# Patient Record
Sex: FEMALE | Race: WHITE | Employment: UNEMPLOYED | URBAN - METROPOLITAN AREA
[De-identification: names, ages, dates, MRNs, and addresses within clinical notes are randomized per-mention and may not be internally consistent; named-entity substitution may affect disease eponyms.]

---

## 2017-12-08 PROBLEM — R10.2 PELVIC PAIN IN FEMALE: Status: ACTIVE | Noted: 2017-12-08

## 2018-09-18 ENCOUNTER — HOSPITAL ENCOUNTER (EMERGENCY)
Age: 23
Discharge: HOME | End: 2018-09-19
Attending: EMERGENCY MEDICINE
Payer: COMMERCIAL

## 2018-09-18 VITALS
BODY MASS INDEX: 27.47 KG/M2 | HEIGHT: 67 IN | DIASTOLIC BLOOD PRESSURE: 56 MMHG | RESPIRATION RATE: 16 BRPM | WEIGHT: 175 LBS | HEART RATE: 75 BPM | TEMPERATURE: 97.6 F | SYSTOLIC BLOOD PRESSURE: 109 MMHG | OXYGEN SATURATION: 95 %

## 2018-09-18 DIAGNOSIS — E86.0 DEHYDRATION: ICD-10-CM

## 2018-09-18 DIAGNOSIS — H81.10 BENIGN PAROXYSMAL POSITIONAL VERTIGO, UNSPECIFIED LATERALITY: Primary | ICD-10-CM

## 2018-09-18 DIAGNOSIS — I95.1 ORTHOSTATIC HYPOTENSION: ICD-10-CM

## 2018-09-18 LAB
CHP ED QC CHECK: NORMAL
PREGNANCY TEST URINE, POC: NORMAL

## 2018-09-18 PROCEDURE — 99284 EMERGENCY DEPT VISIT MOD MDM: CPT

## 2018-09-18 PROCEDURE — 6370000000 HC RX 637 (ALT 250 FOR IP): Performed by: STUDENT IN AN ORGANIZED HEALTH CARE EDUCATION/TRAINING PROGRAM

## 2018-09-18 PROCEDURE — 2580000003 HC RX 258: Performed by: STUDENT IN AN ORGANIZED HEALTH CARE EDUCATION/TRAINING PROGRAM

## 2018-09-18 RX ORDER — MECLIZINE HCL 12.5 MG/1
37.5 TABLET ORAL ONCE
Status: COMPLETED | OUTPATIENT
Start: 2018-09-18 | End: 2018-09-18

## 2018-09-18 RX ORDER — SODIUM CHLORIDE 0.9 % (FLUSH) 0.9 %
10 SYRINGE (ML) INJECTION ONCE
Status: DISCONTINUED | OUTPATIENT
Start: 2018-09-18 | End: 2018-09-19 | Stop reason: HOSPADM

## 2018-09-18 RX ADMIN — SODIUM CHLORIDE 1000 ML: 9 INJECTION, SOLUTION INTRAVENOUS at 22:29

## 2018-09-18 RX ADMIN — MECLIZINE 37.5 MG: 12.5 TABLET ORAL at 22:27

## 2018-09-18 ASSESSMENT — ENCOUNTER SYMPTOMS
NAUSEA: 1
VOMITING: 0
EYE REDNESS: 0
VISUAL CHANGE: 0
EYE PAIN: 0
DIARRHEA: 0
BLOOD IN STOOL: 0
SHORTNESS OF BREATH: 0

## 2018-11-26 RX ORDER — SODIUM CHLORIDE, SODIUM LACTATE, POTASSIUM CHLORIDE, CALCIUM CHLORIDE 600; 310; 30; 20 MG/100ML; MG/100ML; MG/100ML; MG/100ML
INJECTION, SOLUTION INTRAVENOUS CONTINUOUS
Status: CANCELLED | OUTPATIENT
Start: 2018-11-26

## 2018-11-26 RX ORDER — SODIUM CHLORIDE 0.9 % (FLUSH) 0.9 %
10 SYRINGE (ML) INJECTION PRN
Status: CANCELLED | OUTPATIENT
Start: 2018-11-26

## 2018-11-27 ENCOUNTER — HOSPITAL ENCOUNTER (OUTPATIENT)
Dept: PREADMISSION TESTING | Age: 23
Discharge: HOME | End: 2018-11-27
Payer: COMMERCIAL

## 2018-11-27 VITALS
OXYGEN SATURATION: 100 % | SYSTOLIC BLOOD PRESSURE: 104 MMHG | BODY MASS INDEX: 27.47 KG/M2 | WEIGHT: 175 LBS | HEIGHT: 67 IN | DIASTOLIC BLOOD PRESSURE: 50 MMHG | HEART RATE: 89 BPM | RESPIRATION RATE: 16 BRPM | TEMPERATURE: 97.8 F

## 2018-11-27 DIAGNOSIS — Z01.818 PRE-OP TESTING: Primary | ICD-10-CM

## 2018-11-27 LAB
HCG(URINE) PREGNANCY TEST: NEGATIVE
HCT VFR BLD CALC: 37.4 % (ref 34–48)
HEMOGLOBIN: 12.1 G/DL (ref 11.5–15.5)
MCH RBC QN AUTO: 28 PG (ref 26–35)
MCHC RBC AUTO-ENTMCNC: 32.4 % (ref 32–34.5)
MCV RBC AUTO: 86.6 FL (ref 80–99.9)
PDW BLD-RTO: 13.8 FL (ref 11.5–15)
PLATELET # BLD: 220 E9/L (ref 130–450)
PMV BLD AUTO: 10.3 FL (ref 7–12)
RBC # BLD: 4.32 E12/L (ref 3.5–5.5)
WBC # BLD: 5.2 E9/L (ref 4.5–11.5)

## 2018-11-27 PROCEDURE — 81025 URINE PREGNANCY TEST: CPT

## 2018-11-27 PROCEDURE — 85027 COMPLETE CBC AUTOMATED: CPT

## 2018-11-27 PROCEDURE — 36415 COLL VENOUS BLD VENIPUNCTURE: CPT

## 2018-11-30 ENCOUNTER — ANESTHESIA (OUTPATIENT)
Dept: OPERATING ROOM | Age: 23
End: 2018-11-30
Payer: COMMERCIAL

## 2018-11-30 ENCOUNTER — HOSPITAL ENCOUNTER (OUTPATIENT)
Age: 23
Setting detail: OUTPATIENT SURGERY
Discharge: HOME | End: 2018-11-30
Attending: OBSTETRICS & GYNECOLOGY | Admitting: OBSTETRICS & GYNECOLOGY
Payer: COMMERCIAL

## 2018-11-30 ENCOUNTER — ANESTHESIA EVENT (OUTPATIENT)
Dept: OPERATING ROOM | Age: 23
End: 2018-11-30
Payer: COMMERCIAL

## 2018-11-30 VITALS
WEIGHT: 173.13 LBS | BODY MASS INDEX: 27.17 KG/M2 | RESPIRATION RATE: 16 BRPM | TEMPERATURE: 98.4 F | HEIGHT: 67 IN | OXYGEN SATURATION: 98 % | SYSTOLIC BLOOD PRESSURE: 104 MMHG | HEART RATE: 115 BPM | DIASTOLIC BLOOD PRESSURE: 62 MMHG

## 2018-11-30 VITALS
RESPIRATION RATE: 2 BRPM | SYSTOLIC BLOOD PRESSURE: 99 MMHG | DIASTOLIC BLOOD PRESSURE: 58 MMHG | OXYGEN SATURATION: 100 % | TEMPERATURE: 97.5 F

## 2018-11-30 DIAGNOSIS — R10.2 PELVIC PAIN IN FEMALE: Primary | ICD-10-CM

## 2018-11-30 PROBLEM — G89.29 CHRONIC FEMALE PELVIC PAIN: Status: ACTIVE | Noted: 2017-12-08

## 2018-11-30 PROCEDURE — 2500000003 HC RX 250 WO HCPCS

## 2018-11-30 PROCEDURE — 2709999900 HC NON-CHARGEABLE SUPPLY: Performed by: OBSTETRICS & GYNECOLOGY

## 2018-11-30 PROCEDURE — 3700000001 HC ADD 15 MINUTES (ANESTHESIA): Performed by: OBSTETRICS & GYNECOLOGY

## 2018-11-30 PROCEDURE — 6360000002 HC RX W HCPCS

## 2018-11-30 PROCEDURE — 2580000003 HC RX 258: Performed by: ANESTHESIOLOGY

## 2018-11-30 PROCEDURE — 3700000000 HC ANESTHESIA ATTENDED CARE: Performed by: OBSTETRICS & GYNECOLOGY

## 2018-11-30 PROCEDURE — 7100000011 HC PHASE II RECOVERY - ADDTL 15 MIN: Performed by: OBSTETRICS & GYNECOLOGY

## 2018-11-30 PROCEDURE — 7100000001 HC PACU RECOVERY - ADDTL 15 MIN: Performed by: OBSTETRICS & GYNECOLOGY

## 2018-11-30 PROCEDURE — 7100000010 HC PHASE II RECOVERY - FIRST 15 MIN: Performed by: OBSTETRICS & GYNECOLOGY

## 2018-11-30 PROCEDURE — 3600000004 HC SURGERY LEVEL 4 BASE: Performed by: OBSTETRICS & GYNECOLOGY

## 2018-11-30 PROCEDURE — 6360000002 HC RX W HCPCS: Performed by: ANESTHESIOLOGY

## 2018-11-30 PROCEDURE — C1765 ADHESION BARRIER: HCPCS | Performed by: OBSTETRICS & GYNECOLOGY

## 2018-11-30 PROCEDURE — 7100000000 HC PACU RECOVERY - FIRST 15 MIN: Performed by: OBSTETRICS & GYNECOLOGY

## 2018-11-30 PROCEDURE — 3600000014 HC SURGERY LEVEL 4 ADDTL 15MIN: Performed by: OBSTETRICS & GYNECOLOGY

## 2018-11-30 RX ORDER — SODIUM CHLORIDE 0.9 % (FLUSH) 0.9 %
10 SYRINGE (ML) INJECTION PRN
Status: DISCONTINUED | OUTPATIENT
Start: 2018-11-30 | End: 2018-11-30 | Stop reason: SDUPTHER

## 2018-11-30 RX ORDER — NEOSTIGMINE METHYLSULFATE 1 MG/ML
INJECTION, SOLUTION INTRAVENOUS PRN
Status: DISCONTINUED | OUTPATIENT
Start: 2018-11-30 | End: 2018-11-30 | Stop reason: SDUPTHER

## 2018-11-30 RX ORDER — SODIUM CHLORIDE, SODIUM LACTATE, POTASSIUM CHLORIDE, CALCIUM CHLORIDE 600; 310; 30; 20 MG/100ML; MG/100ML; MG/100ML; MG/100ML
INJECTION, SOLUTION INTRAVENOUS CONTINUOUS
Status: DISCONTINUED | OUTPATIENT
Start: 2018-11-30 | End: 2018-11-30 | Stop reason: HOSPADM

## 2018-11-30 RX ORDER — KETOROLAC TROMETHAMINE 30 MG/ML
INJECTION, SOLUTION INTRAMUSCULAR; INTRAVENOUS PRN
Status: DISCONTINUED | OUTPATIENT
Start: 2018-11-30 | End: 2018-11-30 | Stop reason: SDUPTHER

## 2018-11-30 RX ORDER — MEPERIDINE HYDROCHLORIDE 25 MG/ML
12.5 INJECTION INTRAMUSCULAR; INTRAVENOUS; SUBCUTANEOUS EVERY 5 MIN PRN
Status: DISCONTINUED | OUTPATIENT
Start: 2018-11-30 | End: 2018-11-30 | Stop reason: HOSPADM

## 2018-11-30 RX ORDER — FENTANYL CITRATE 50 UG/ML
50 INJECTION, SOLUTION INTRAMUSCULAR; INTRAVENOUS EVERY 5 MIN PRN
Status: DISCONTINUED | OUTPATIENT
Start: 2018-11-30 | End: 2018-11-30 | Stop reason: HOSPADM

## 2018-11-30 RX ORDER — SODIUM CHLORIDE 0.9 % (FLUSH) 0.9 %
10 SYRINGE (ML) INJECTION EVERY 12 HOURS SCHEDULED
Status: DISCONTINUED | OUTPATIENT
Start: 2018-11-30 | End: 2018-11-30 | Stop reason: HOSPADM

## 2018-11-30 RX ORDER — FENTANYL CITRATE 50 UG/ML
INJECTION, SOLUTION INTRAMUSCULAR; INTRAVENOUS PRN
Status: DISCONTINUED | OUTPATIENT
Start: 2018-11-30 | End: 2018-11-30 | Stop reason: SDUPTHER

## 2018-11-30 RX ORDER — DEXAMETHASONE SODIUM PHOSPHATE 4 MG/ML
INJECTION, SOLUTION INTRA-ARTICULAR; INTRALESIONAL; INTRAMUSCULAR; INTRAVENOUS; SOFT TISSUE PRN
Status: DISCONTINUED | OUTPATIENT
Start: 2018-11-30 | End: 2018-11-30 | Stop reason: SDUPTHER

## 2018-11-30 RX ORDER — GLYCOPYRROLATE 1 MG/5 ML
SYRINGE (ML) INTRAVENOUS PRN
Status: DISCONTINUED | OUTPATIENT
Start: 2018-11-30 | End: 2018-11-30 | Stop reason: SDUPTHER

## 2018-11-30 RX ORDER — ONDANSETRON 2 MG/ML
INJECTION INTRAMUSCULAR; INTRAVENOUS PRN
Status: DISCONTINUED | OUTPATIENT
Start: 2018-11-30 | End: 2018-11-30 | Stop reason: SDUPTHER

## 2018-11-30 RX ORDER — FENTANYL CITRATE 50 UG/ML
25 INJECTION, SOLUTION INTRAMUSCULAR; INTRAVENOUS EVERY 5 MIN PRN
Status: DISCONTINUED | OUTPATIENT
Start: 2018-11-30 | End: 2018-11-30 | Stop reason: HOSPADM

## 2018-11-30 RX ORDER — MIDAZOLAM HYDROCHLORIDE 1 MG/ML
INJECTION INTRAMUSCULAR; INTRAVENOUS PRN
Status: DISCONTINUED | OUTPATIENT
Start: 2018-11-30 | End: 2018-11-30 | Stop reason: SDUPTHER

## 2018-11-30 RX ORDER — ONDANSETRON 2 MG/ML
4 INJECTION INTRAMUSCULAR; INTRAVENOUS
Status: DISCONTINUED | OUTPATIENT
Start: 2018-11-30 | End: 2018-11-30 | Stop reason: HOSPADM

## 2018-11-30 RX ORDER — ROCURONIUM BROMIDE 10 MG/ML
INJECTION, SOLUTION INTRAVENOUS PRN
Status: DISCONTINUED | OUTPATIENT
Start: 2018-11-30 | End: 2018-11-30 | Stop reason: SDUPTHER

## 2018-11-30 RX ORDER — PROPOFOL 10 MG/ML
INJECTION, EMULSION INTRAVENOUS PRN
Status: DISCONTINUED | OUTPATIENT
Start: 2018-11-30 | End: 2018-11-30 | Stop reason: SDUPTHER

## 2018-11-30 RX ORDER — LIDOCAINE HYDROCHLORIDE 20 MG/ML
INJECTION, SOLUTION INFILTRATION; PERINEURAL PRN
Status: DISCONTINUED | OUTPATIENT
Start: 2018-11-30 | End: 2018-11-30 | Stop reason: SDUPTHER

## 2018-11-30 RX ORDER — FENTANYL CITRATE 50 UG/ML
INJECTION, SOLUTION INTRAMUSCULAR; INTRAVENOUS
Status: DISCONTINUED
Start: 2018-11-30 | End: 2018-11-30 | Stop reason: HOSPADM

## 2018-11-30 RX ORDER — SODIUM CHLORIDE 0.9 % (FLUSH) 0.9 %
10 SYRINGE (ML) INJECTION PRN
Status: DISCONTINUED | OUTPATIENT
Start: 2018-11-30 | End: 2018-11-30 | Stop reason: HOSPADM

## 2018-11-30 RX ADMIN — PROPOFOL 200 MG: 10 INJECTION, EMULSION INTRAVENOUS at 13:19

## 2018-11-30 RX ADMIN — KETOROLAC TROMETHAMINE 30 MG: 30 INJECTION, SOLUTION INTRAMUSCULAR; INTRAVENOUS at 13:51

## 2018-11-30 RX ADMIN — FENTANYL CITRATE 50 MCG: 50 INJECTION, SOLUTION INTRAMUSCULAR; INTRAVENOUS at 13:34

## 2018-11-30 RX ADMIN — SODIUM CHLORIDE, POTASSIUM CHLORIDE, SODIUM LACTATE AND CALCIUM CHLORIDE: 600; 310; 30; 20 INJECTION, SOLUTION INTRAVENOUS at 10:36

## 2018-11-30 RX ADMIN — FENTANYL CITRATE 100 MCG: 50 INJECTION, SOLUTION INTRAMUSCULAR; INTRAVENOUS at 13:19

## 2018-11-30 RX ADMIN — MIDAZOLAM 2 MG: 1 INJECTION INTRAMUSCULAR; INTRAVENOUS at 13:17

## 2018-11-30 RX ADMIN — FENTANYL CITRATE 50 MCG: 50 INJECTION, SOLUTION INTRAMUSCULAR; INTRAVENOUS at 13:25

## 2018-11-30 RX ADMIN — DEXAMETHASONE SODIUM PHOSPHATE 4 MG: 4 INJECTION, SOLUTION INTRA-ARTICULAR; INTRALESIONAL; INTRAMUSCULAR; INTRAVENOUS; SOFT TISSUE at 13:19

## 2018-11-30 RX ADMIN — LIDOCAINE HYDROCHLORIDE 50 MG: 20 INJECTION, SOLUTION INFILTRATION; PERINEURAL at 13:19

## 2018-11-30 RX ADMIN — ROCURONIUM BROMIDE 30 MG: 10 INJECTION, SOLUTION INTRAVENOUS at 13:19

## 2018-11-30 RX ADMIN — DEXAMETHASONE SODIUM PHOSPHATE 4 MG: 4 INJECTION, SOLUTION INTRA-ARTICULAR; INTRALESIONAL; INTRAMUSCULAR; INTRAVENOUS; SOFT TISSUE at 13:43

## 2018-11-30 RX ADMIN — Medication 0.6 MG: at 13:51

## 2018-11-30 RX ADMIN — FENTANYL CITRATE 50 MCG: 50 INJECTION, SOLUTION INTRAMUSCULAR; INTRAVENOUS at 14:05

## 2018-11-30 RX ADMIN — ONDANSETRON HYDROCHLORIDE 4 MG: 2 INJECTION, SOLUTION INTRAMUSCULAR; INTRAVENOUS at 13:19

## 2018-11-30 RX ADMIN — Medication 3 MG: at 13:51

## 2018-11-30 RX ADMIN — FENTANYL CITRATE 50 MCG: 50 INJECTION, SOLUTION INTRAMUSCULAR; INTRAVENOUS at 14:32

## 2018-11-30 RX ADMIN — FENTANYL CITRATE 50 MCG: 50 INJECTION, SOLUTION INTRAMUSCULAR; INTRAVENOUS at 14:22

## 2018-11-30 ASSESSMENT — PULMONARY FUNCTION TESTS
PIF_VALUE: 14
PIF_VALUE: 29
PIF_VALUE: 0
PIF_VALUE: 0
PIF_VALUE: 19
PIF_VALUE: 14
PIF_VALUE: 36
PIF_VALUE: 16
PIF_VALUE: 32
PIF_VALUE: 13
PIF_VALUE: 16
PIF_VALUE: 17
PIF_VALUE: 2
PIF_VALUE: 14
PIF_VALUE: 23
PIF_VALUE: 20
PIF_VALUE: 2
PIF_VALUE: 14
PIF_VALUE: 21
PIF_VALUE: 12
PIF_VALUE: 12
PIF_VALUE: 17
PIF_VALUE: 20
PIF_VALUE: 0
PIF_VALUE: 36
PIF_VALUE: 35
PIF_VALUE: 2
PIF_VALUE: 36
PIF_VALUE: 20
PIF_VALUE: 2
PIF_VALUE: 0
PIF_VALUE: 17
PIF_VALUE: 27
PIF_VALUE: 18
PIF_VALUE: 16
PIF_VALUE: 18
PIF_VALUE: 0
PIF_VALUE: 17
PIF_VALUE: 0
PIF_VALUE: 13
PIF_VALUE: 17
PIF_VALUE: 1
PIF_VALUE: 12
PIF_VALUE: 14
PIF_VALUE: 14
PIF_VALUE: 16

## 2018-11-30 ASSESSMENT — PAIN DESCRIPTION - PROGRESSION
CLINICAL_PROGRESSION: GRADUALLY WORSENING
CLINICAL_PROGRESSION: GRADUALLY IMPROVING

## 2018-11-30 ASSESSMENT — PAIN DESCRIPTION - FREQUENCY
FREQUENCY: CONTINUOUS
FREQUENCY: CONTINUOUS

## 2018-11-30 ASSESSMENT — PAIN SCALES - GENERAL
PAINLEVEL_OUTOF10: 6
PAINLEVEL_OUTOF10: 0
PAINLEVEL_OUTOF10: 2
PAINLEVEL_OUTOF10: 4
PAINLEVEL_OUTOF10: 4

## 2018-11-30 ASSESSMENT — PAIN DESCRIPTION - ONSET
ONSET: ON-GOING
ONSET: SUDDEN
ONSET: GRADUAL

## 2018-11-30 ASSESSMENT — PAIN DESCRIPTION - DESCRIPTORS
DESCRIPTORS: CONSTANT;DISCOMFORT;DULL
DESCRIPTORS: ACHING
DESCRIPTORS: CONSTANT;DISCOMFORT;SHARP

## 2018-11-30 ASSESSMENT — PAIN DESCRIPTION - PAIN TYPE
TYPE: ACUTE PAIN;SURGICAL PAIN
TYPE: ACUTE PAIN;SURGICAL PAIN
TYPE: SURGICAL PAIN

## 2018-11-30 ASSESSMENT — PAIN DESCRIPTION - ORIENTATION
ORIENTATION: LOWER;MID
ORIENTATION: MID
ORIENTATION: ANTERIOR;MID;LOWER

## 2018-11-30 ASSESSMENT — PAIN - FUNCTIONAL ASSESSMENT: PAIN_FUNCTIONAL_ASSESSMENT: 0-10

## 2018-11-30 ASSESSMENT — PAIN DESCRIPTION - LOCATION
LOCATION: ABDOMEN

## 2018-11-30 NOTE — OP NOTE
injector was removed. She was placed in a supine position, awakened from anesthesia, and transferred to the recovery room in a good stable condition.      Nyasia Garcia  11/30/2018  1:54 PM

## 2018-11-30 NOTE — H&P
Department of Gynecology  H&P Note          CHIEF COMPLAINT:   Chronic pelvic pain    History obtained from patient    HISTORY OF PRESENT ILLNESS:     The patient is a 21 y.o. female with significant past medical history of endometriosis who presents with chronic pelvic pain    Past Medical History:        Diagnosis Date    Depression     ADHD    Prolonged emergence from general anesthesia     slow to wake up after c section     Past Surgical History:        Procedure Laterality Date     SECTION      LAPAROSCOPY  2017    MIDDLE EAR SURGERY      MYRINGOTOMY AND TYMPANOSTOMY TUBE PLACEMENT      TONSILLECTOMY         Past Gynecological History:    1. Last menstrual period:  18  2. Menses: interval:  4 weeks  duration:  6 day(s)  3. Contraception: Nexplanon  4. Sexually transmitted disease history: none        A. Number of sexual partners in the last 6 months: 1    meds:No current facility-administered medications for this encounter. No current outpatient prescriptions on file. Allergies:  Pcn [penicillins]     Social History:  TOBACCO:   reports that she has never smoked. She has never used smokeless tobacco.  ETOH:   reports that she does not drink alcohol. DRUGS:   reports that she does not use drugs. Family History:   No family history on file. PHYSICAL EXAM:    Vitals:  LMP 2018 (Exact Date)     CONSTITUTIONAL:  awake, alert, cooperative, no apparent distress, and appears stated age  ENT:  Normocephalic, without obvious abnormality, atraumatic, sinuses nontender on palpation, external ears without lesions, oral pharynx with moist mucus membranes, tonsils without erythema or exudates, gums normal and good dentition.   NECK:  Supple, symmetrical, trachea midline, no adenopathy, thyroid symmetric, not enlarged and no tenderness, skin normal  BACK:  Symmetric, no curvature, spinous processes are non-tender on palpation, paraspinous muscles are non-tender on palpation, no

## 2018-11-30 NOTE — PROGRESS NOTES
CLINICAL PHARMACY NOTE: MEDS TO [address] Select Patient?: No  Total # of Prescriptions Filled: 1   The following medications were delivered to the patient:  · Hydrocodone/apap 5/325  Total # of Interventions Completed: 3  Time Spent (min): 15    Additional Documentation:

## 2019-03-15 ENCOUNTER — HOSPITAL ENCOUNTER (EMERGENCY)
Age: 24
Discharge: HOME | End: 2019-03-15
Payer: COMMERCIAL

## 2019-03-15 VITALS
BODY MASS INDEX: 28.98 KG/M2 | HEART RATE: 92 BPM | RESPIRATION RATE: 16 BRPM | SYSTOLIC BLOOD PRESSURE: 115 MMHG | DIASTOLIC BLOOD PRESSURE: 72 MMHG | OXYGEN SATURATION: 98 % | TEMPERATURE: 97.5 F | WEIGHT: 185 LBS

## 2019-03-15 DIAGNOSIS — J06.9 VIRAL UPPER RESPIRATORY TRACT INFECTION: Primary | ICD-10-CM

## 2019-03-15 PROCEDURE — 99212 OFFICE O/P EST SF 10 MIN: CPT

## 2019-03-15 RX ORDER — BROMPHENIRAMINE MALEATE, PSEUDOEPHEDRINE HYDROCHLORIDE, AND DEXTROMETHORPHAN HYDROBROMIDE 2; 30; 10 MG/5ML; MG/5ML; MG/5ML
5 SYRUP ORAL 4 TIMES DAILY PRN
Qty: 140 ML | Refills: 0 | Status: SHIPPED | OUTPATIENT
Start: 2019-03-15 | End: 2019-03-22

## 2019-04-01 ENCOUNTER — HOSPITAL ENCOUNTER (EMERGENCY)
Age: 24
Discharge: HOME | End: 2019-04-01
Payer: COMMERCIAL

## 2019-04-01 VITALS
SYSTOLIC BLOOD PRESSURE: 123 MMHG | HEART RATE: 99 BPM | OXYGEN SATURATION: 99 % | BODY MASS INDEX: 29.03 KG/M2 | DIASTOLIC BLOOD PRESSURE: 78 MMHG | RESPIRATION RATE: 16 BRPM | TEMPERATURE: 97.8 F | HEIGHT: 67 IN | WEIGHT: 185 LBS

## 2019-04-01 DIAGNOSIS — J06.9 ACUTE UPPER RESPIRATORY INFECTION: Primary | ICD-10-CM

## 2019-04-01 PROCEDURE — 99282 EMERGENCY DEPT VISIT SF MDM: CPT

## 2019-04-01 RX ORDER — BROMPHENIRAMINE MALEATE, PSEUDOEPHEDRINE HYDROCHLORIDE, AND DEXTROMETHORPHAN HYDROBROMIDE 2; 30; 10 MG/5ML; MG/5ML; MG/5ML
5 SYRUP ORAL 4 TIMES DAILY PRN
Qty: 1 BOTTLE | Refills: 0 | Status: SHIPPED | OUTPATIENT
Start: 2019-04-01 | End: 2019-04-08

## 2019-04-02 NOTE — ED PROVIDER NOTES
[facility]     Department of Emergency Medicine   ED  Provider Note  Admit Date/RoomTime: 2019  8:29 PM  ED Room: [place] [address]  Chief Complaint:   Influenza (fever today, headache, nausea without emesis, )    History of Present Illness   Source of history provided by:  patient and spouse/SO. History/Exam Limitations: none. Salena Ferguson is a 21 y.o. old female with a past medical history of:   Past Medical History:   Diagnosis Date    Depression     ADHD    Endometriosis of pelvis 2018    Prolonged emergence from general anesthesia     slow to wake up after c section    presents to the emergency department by private vehicle, for cough congestion, fever, headache, and nausea, which began 1 day(s) prior to arrival.  Since onset the symptoms have been persistent and mild-moderate in severity. The symptoms are associated with none of significance. There has been NO none of significance. ROS   Pertinent positives and negatives are stated within HPI, all other systems reviewed and are negative. Past Surgical History:  has a past surgical history that includes Tonsillectomy; Myringotomy Tympanostomy Tube Placement; Middle ear surgery;  section; laparoscopy (2017); and pr lap,lysis of adhesions (N/A, 2018). Social History:  reports that she has never smoked. She has never used smokeless tobacco. She reports that she does not drink alcohol or use drugs. Family History: family history is not on file. Allergies: Pcn [penicillins]    Physical Exam           ED Triage Vitals   BP Temp Temp src Pulse Resp SpO2 Height Weight   19 -- 19   123/78 97.8 °F (36.6 °C)  99 16 99 % 5' 7\" (1.702 m) 185 lb (83.9 kg)      Oxygen Saturation Interpretation: Normal.    · Constitutional:  Alert, development consistent with age.   · Ears:  External Ears: Bilateral normal.               TM's & External Canals: normal appearance. · Nose:   There is clear rhinorrhea. · Sinuses: no Bilateral maxillary sinus tenderness. no Bilateral frontal sinus tenderness. · Mouth:  normal tongue and buccal mucosa. · Throat: no erythema or exudates noted. Teeth and gums normal..  Airway Patent. · Neck:  Supple. There is no  preauricular, submental, parotid, anterior cervical and posterior cervical node tenderness. · Respiratory:   Breath sounds: Bilateral normal.  Lung sounds: normal.   · CV:  Regular rate and rhythm, normal heart sounds, without pathological murmurs, ectopy, gallops, or rubs. · GI:  Abdomen Soft, nontender, good bowel sounds. No firm or pulsatile mass. · Integument:  Normal turgor. Warm, dry, without visible rash. · Neurological:  Oriented. Motor functions intact. Lab / Imaging Results   (All laboratory and radiology results have been personally reviewed by myself)  Labs:  No results found for this visit on 04/01/19. Imaging: All Radiology results interpreted by Radiologist unless otherwise noted. No orders to display       ED Course / Medical Decision Making   Medications - No data to display       Medical Decision Making:    Based on low suspicion for pneumonia as per history/physical findings, imaging was not done. Upper respiratory infection is likely to  be viral in etiology. Antibiotics are not indicated at this time based on clinical presentation and physical findings. She is not hypoxic. Patient is well appearing, non toxic and appropriate for outpatient management. Plan of Care: Normal progression of disease discussed. All questions answered. Explained the rationale for symptomatic treatment rather than use of an antibiotic. Instruction provided in the use of fluids, vaporizer, acetaminophen, and other OTC medication for symptom control. Extra fluids  Analgesics as needed, dose reviewed. Follow up as needed should symptoms fail to improve.

## 2019-08-26 ENCOUNTER — HOSPITAL ENCOUNTER (EMERGENCY)
Age: 24
Discharge: HOME | End: 2019-08-26
Payer: COMMERCIAL

## 2019-08-26 VITALS
TEMPERATURE: 98.1 F | SYSTOLIC BLOOD PRESSURE: 100 MMHG | WEIGHT: 188 LBS | HEART RATE: 94 BPM | RESPIRATION RATE: 16 BRPM | OXYGEN SATURATION: 99 % | BODY MASS INDEX: 29.44 KG/M2 | DIASTOLIC BLOOD PRESSURE: 66 MMHG

## 2019-08-26 DIAGNOSIS — T30.0 BURN: Primary | ICD-10-CM

## 2019-08-26 PROCEDURE — 99212 OFFICE O/P EST SF 10 MIN: CPT

## 2019-08-26 RX ORDER — CEPHALEXIN 500 MG/1
500 CAPSULE ORAL 3 TIMES DAILY
Qty: 21 CAPSULE | Refills: 0 | Status: SHIPPED | OUTPATIENT
Start: 2019-08-26 | End: 2019-09-02

## 2019-08-26 ASSESSMENT — PAIN DESCRIPTION - PAIN TYPE: TYPE: ACUTE PAIN

## 2019-08-26 ASSESSMENT — PAIN SCALES - GENERAL: PAINLEVEL_OUTOF10: 6

## 2019-08-26 ASSESSMENT — PAIN DESCRIPTION - LOCATION: LOCATION: LEG

## 2019-08-26 ASSESSMENT — PAIN DESCRIPTION - ORIENTATION: ORIENTATION: LEFT

## 2019-08-26 ASSESSMENT — PAIN DESCRIPTION - DESCRIPTORS: DESCRIPTORS: BURNING

## 2019-08-27 ENCOUNTER — HOSPITAL ENCOUNTER (EMERGENCY)
Age: 24
Discharge: HOME | End: 2019-08-27
Attending: EMERGENCY MEDICINE
Payer: COMMERCIAL

## 2019-08-27 VITALS
HEART RATE: 100 BPM | RESPIRATION RATE: 20 BRPM | TEMPERATURE: 98.6 F | DIASTOLIC BLOOD PRESSURE: 73 MMHG | BODY MASS INDEX: 29.51 KG/M2 | HEIGHT: 67 IN | SYSTOLIC BLOOD PRESSURE: 112 MMHG | OXYGEN SATURATION: 97 % | WEIGHT: 188 LBS

## 2019-08-27 DIAGNOSIS — T24.202A PARTIAL THICKNESS BURN OF LEFT LOWER EXTREMITY, INITIAL ENCOUNTER: Primary | ICD-10-CM

## 2019-08-27 PROCEDURE — 16020 DRESS/DEBRID P-THICK BURN S: CPT

## 2019-08-27 PROCEDURE — 99283 EMERGENCY DEPT VISIT LOW MDM: CPT

## 2019-08-27 RX ORDER — ACETAMINOPHEN 500 MG
500 TABLET ORAL EVERY 6 HOURS PRN
Qty: 30 TABLET | Refills: 0 | Status: ON HOLD | OUTPATIENT
Start: 2019-08-27 | End: 2019-09-26 | Stop reason: HOSPADM

## 2019-08-27 RX ORDER — BACITRACIN, NEOMYCIN, POLYMYXIN B 400; 3.5; 5 [USP'U]/G; MG/G; [USP'U]/G
OINTMENT TOPICAL
Qty: 30 G | Refills: 0 | Status: SHIPPED | OUTPATIENT
Start: 2019-08-27 | End: 2019-09-06

## 2019-08-27 ASSESSMENT — ENCOUNTER SYMPTOMS
SHORTNESS OF BREATH: 0
DIARRHEA: 0
EYE PAIN: 0
BACK PAIN: 0
VOMITING: 0
NAUSEA: 0
COUGH: 0
EYE DISCHARGE: 0
SINUS PRESSURE: 0
SORE THROAT: 0
WHEEZING: 0
ABDOMINAL DISTENTION: 0
EYE REDNESS: 0
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
## 2019-09-23 ENCOUNTER — HOSPITAL ENCOUNTER (EMERGENCY)
Age: 24
Discharge: HOME | End: 2019-09-23
Payer: COMMERCIAL

## 2019-09-23 VITALS
BODY MASS INDEX: 28.19 KG/M2 | OXYGEN SATURATION: 99 % | SYSTOLIC BLOOD PRESSURE: 106 MMHG | WEIGHT: 180 LBS | HEART RATE: 87 BPM | RESPIRATION RATE: 20 BRPM | DIASTOLIC BLOOD PRESSURE: 70 MMHG | TEMPERATURE: 97.9 F

## 2019-09-23 DIAGNOSIS — H69.83 DYSFUNCTION OF BOTH EUSTACHIAN TUBES: Primary | ICD-10-CM

## 2019-09-23 PROCEDURE — 99212 OFFICE O/P EST SF 10 MIN: CPT

## 2019-09-23 RX ORDER — CEPHALEXIN 500 MG/1
500 CAPSULE ORAL 3 TIMES DAILY
Qty: 21 CAPSULE | Refills: 0 | Status: SHIPPED | OUTPATIENT
Start: 2019-09-23 | End: 2019-09-30

## 2019-09-23 ASSESSMENT — PAIN SCALES - GENERAL: PAINLEVEL_OUTOF10: 8

## 2019-09-23 ASSESSMENT — PAIN DESCRIPTION - ORIENTATION: ORIENTATION: RIGHT;LEFT

## 2019-09-23 ASSESSMENT — PAIN DESCRIPTION - LOCATION: LOCATION: EAR

## 2019-09-23 NOTE — ED PROVIDER NOTES
Department of Emergency Medicine   [facility]  Provider Note  Admit Date/RoomTime: 2019  5:20 PM  Room:   Chief Complaint:   Otalgia (started  today    with  ajay ear pain   just getting over cold   coughing  up mucous ,   pt is 12 weeks  preg )    History of Present Illness   Source of history provided by:  patient. History/Exam Limitations: none. Khang Zuniga is a 25 y.o. old female with a past medical history of:   Past Medical History:   Diagnosis Date    Depression     ADHD    Endometriosis of pelvis 2018    Prolonged emergence from general anesthesia     slow to wake up after c section   ,who presents to urgent care center with complaint of plugged sensation in both ears. Symptoms began today. She is just getting a cold and feels like now her ears are plugged she said the hearing is decreased in both ears it sounds muffled. And she has pressure in her ears. Does not have any fever or neck stiffness does not have any chest pain or shortness of breath. ROS    Pertinent positives and negatives are stated within HPI, all other systems reviewed and are negative. Past Surgical History:  has a past surgical history that includes Tonsillectomy; Myringotomy Tympanostomy Tube Placement; Middle ear surgery;  section; laparoscopy (2017); and pr lap,lysis of adhesions (N/A, 2018). Social History:  reports that she has never smoked. She has never used smokeless tobacco. She reports that she does not drink alcohol or use drugs. Family History: family history is not on file. Allergies: Pcn [penicillins]    Physical Exam           ED Triage Vitals [19 1722]   BP Temp Temp Source Pulse Resp SpO2 Height Weight   106/70 97.9 °F (36.6 °C) Oral 87 20 99 % -- 180 lb (81.6 kg)      Oxygen Saturation Interpretation: Normal.    Constitutional:  Alert, development consistent with age. Ears:  External Ears: Bilateral pain with motion.

## 2019-09-25 ENCOUNTER — APPOINTMENT (OUTPATIENT)
Dept: ULTRASOUND IMAGING | Age: 24
End: 2019-09-25
Payer: COMMERCIAL

## 2019-09-25 ENCOUNTER — HOSPITAL ENCOUNTER (EMERGENCY)
Age: 24
Discharge: HOME | End: 2019-09-26
Attending: EMERGENCY MEDICINE
Payer: COMMERCIAL

## 2019-09-25 VITALS
OXYGEN SATURATION: 100 % | DIASTOLIC BLOOD PRESSURE: 76 MMHG | TEMPERATURE: 98.1 F | WEIGHT: 180 LBS | BODY MASS INDEX: 28.25 KG/M2 | HEART RATE: 91 BPM | SYSTOLIC BLOOD PRESSURE: 119 MMHG | RESPIRATION RATE: 20 BRPM | HEIGHT: 67 IN

## 2019-09-25 DIAGNOSIS — O03.9 MISCARRIAGE: Primary | ICD-10-CM

## 2019-09-25 LAB
ABO/RH: NORMAL
ALBUMIN SERPL-MCNC: 4.1 G/DL (ref 3.5–5.2)
ALP BLD-CCNC: 105 U/L (ref 35–104)
ALT SERPL-CCNC: 9 U/L (ref 0–32)
ANION GAP SERPL CALCULATED.3IONS-SCNC: 14 MMOL/L (ref 7–16)
AST SERPL-CCNC: 13 U/L (ref 0–31)
BASOPHILS ABSOLUTE: 0.02 E9/L (ref 0–0.2)
BASOPHILS RELATIVE PERCENT: 0.2 % (ref 0–2)
BILIRUB SERPL-MCNC: 0.3 MG/DL (ref 0–1.2)
BUN BLDV-MCNC: 5 MG/DL (ref 6–20)
CALCIUM SERPL-MCNC: 9.3 MG/DL (ref 8.6–10.2)
CHLORIDE BLD-SCNC: 101 MMOL/L (ref 98–107)
CO2: 23 MMOL/L (ref 22–29)
CREAT SERPL-MCNC: 0.5 MG/DL (ref 0.5–1)
EOSINOPHILS ABSOLUTE: 0.08 E9/L (ref 0.05–0.5)
EOSINOPHILS RELATIVE PERCENT: 1 % (ref 0–6)
GFR AFRICAN AMERICAN: >60
GFR NON-AFRICAN AMERICAN: >60 ML/MIN/1.73
GLUCOSE BLD-MCNC: 98 MG/DL (ref 74–99)
HCT VFR BLD CALC: 33.2 % (ref 34–48)
HEMOGLOBIN: 10.7 G/DL (ref 11.5–15.5)
IMMATURE GRANULOCYTES #: 0.02 E9/L
IMMATURE GRANULOCYTES %: 0.2 % (ref 0–5)
LYMPHOCYTES ABSOLUTE: 1.51 E9/L (ref 1.5–4)
LYMPHOCYTES RELATIVE PERCENT: 18.6 % (ref 20–42)
MCH RBC QN AUTO: 28.5 PG (ref 26–35)
MCHC RBC AUTO-ENTMCNC: 32.2 % (ref 32–34.5)
MCV RBC AUTO: 88.3 FL (ref 80–99.9)
MONOCYTES ABSOLUTE: 0.54 E9/L (ref 0.1–0.95)
MONOCYTES RELATIVE PERCENT: 6.7 % (ref 2–12)
NEUTROPHILS ABSOLUTE: 5.93 E9/L (ref 1.8–7.3)
NEUTROPHILS RELATIVE PERCENT: 73.3 % (ref 43–80)
PDW BLD-RTO: 15.9 FL (ref 11.5–15)
PLATELET # BLD: 237 E9/L (ref 130–450)
PMV BLD AUTO: 10.5 FL (ref 7–12)
POTASSIUM REFLEX MAGNESIUM: 4.6 MMOL/L (ref 3.5–5)
RBC # BLD: 3.76 E12/L (ref 3.5–5.5)
SODIUM BLD-SCNC: 138 MMOL/L (ref 132–146)
TOTAL PROTEIN: 7.8 G/DL (ref 6.4–8.3)
WBC # BLD: 8.1 E9/L (ref 4.5–11.5)

## 2019-09-25 PROCEDURE — 86901 BLOOD TYPING SEROLOGIC RH(D): CPT

## 2019-09-25 PROCEDURE — 36415 COLL VENOUS BLD VENIPUNCTURE: CPT

## 2019-09-25 PROCEDURE — 88300 SURGICAL PATH GROSS: CPT

## 2019-09-25 PROCEDURE — 86900 BLOOD TYPING SEROLOGIC ABO: CPT

## 2019-09-25 PROCEDURE — 76801 OB US < 14 WKS SINGLE FETUS: CPT

## 2019-09-25 PROCEDURE — 80053 COMPREHEN METABOLIC PANEL: CPT

## 2019-09-25 PROCEDURE — 85025 COMPLETE CBC W/AUTO DIFF WBC: CPT

## 2019-09-25 PROCEDURE — 99284 EMERGENCY DEPT VISIT MOD MDM: CPT

## 2019-09-25 ASSESSMENT — ENCOUNTER SYMPTOMS
EYE REDNESS: 0
SHORTNESS OF BREATH: 0
NAUSEA: 0
ABDOMINAL PAIN: 0
VOMITING: 0

## 2019-09-26 ENCOUNTER — ANESTHESIA EVENT (OUTPATIENT)
Dept: OPERATING ROOM | Age: 24
End: 2019-09-26
Payer: COMMERCIAL

## 2019-09-26 ENCOUNTER — ANESTHESIA (OUTPATIENT)
Dept: OPERATING ROOM | Age: 24
End: 2019-09-26
Payer: COMMERCIAL

## 2019-09-26 ENCOUNTER — HOSPITAL ENCOUNTER (OUTPATIENT)
Age: 24
Setting detail: OBSERVATION
Discharge: HOME | End: 2019-09-27
Attending: LEGAL MEDICINE | Admitting: LEGAL MEDICINE
Payer: COMMERCIAL

## 2019-09-26 VITALS
DIASTOLIC BLOOD PRESSURE: 62 MMHG | OXYGEN SATURATION: 99 % | RESPIRATION RATE: 2 BRPM | SYSTOLIC BLOOD PRESSURE: 101 MMHG

## 2019-09-26 PROBLEM — O03.9 MISCARRIAGE: Status: ACTIVE | Noted: 2019-09-26

## 2019-09-26 LAB
HCT VFR BLD CALC: 32.5 % (ref 34–48)
HEMOGLOBIN: 10.5 G/DL (ref 11.5–15.5)
MCH RBC QN AUTO: 28.6 PG (ref 26–35)
MCHC RBC AUTO-ENTMCNC: 32.3 % (ref 32–34.5)
MCV RBC AUTO: 88.6 FL (ref 80–99.9)
PDW BLD-RTO: 15.8 FL (ref 11.5–15)
PLATELET # BLD: 244 E9/L (ref 130–450)
PMV BLD AUTO: 10.9 FL (ref 7–12)
RBC # BLD: 3.67 E12/L (ref 3.5–5.5)
WBC # BLD: 5.8 E9/L (ref 4.5–11.5)

## 2019-09-26 PROCEDURE — 88305 TISSUE EXAM BY PATHOLOGIST: CPT

## 2019-09-26 PROCEDURE — 7100000001 HC PACU RECOVERY - ADDTL 15 MIN: Performed by: LEGAL MEDICINE

## 2019-09-26 PROCEDURE — 7100000000 HC PACU RECOVERY - FIRST 15 MIN: Performed by: LEGAL MEDICINE

## 2019-09-26 PROCEDURE — 2580000003 HC RX 258: Performed by: LEGAL MEDICINE

## 2019-09-26 PROCEDURE — 85027 COMPLETE CBC AUTOMATED: CPT

## 2019-09-26 PROCEDURE — 3700000000 HC ANESTHESIA ATTENDED CARE: Performed by: LEGAL MEDICINE

## 2019-09-26 PROCEDURE — 36415 COLL VENOUS BLD VENIPUNCTURE: CPT

## 2019-09-26 PROCEDURE — 3600000002 HC SURGERY LEVEL 2 BASE: Performed by: LEGAL MEDICINE

## 2019-09-26 PROCEDURE — 2709999900 HC NON-CHARGEABLE SUPPLY: Performed by: LEGAL MEDICINE

## 2019-09-26 PROCEDURE — 6360000002 HC RX W HCPCS: Performed by: LEGAL MEDICINE

## 2019-09-26 PROCEDURE — 6360000002 HC RX W HCPCS: Performed by: NURSE ANESTHETIST, CERTIFIED REGISTERED

## 2019-09-26 PROCEDURE — G0379 DIRECT REFER HOSPITAL OBSERV: HCPCS

## 2019-09-26 PROCEDURE — 3600000012 HC SURGERY LEVEL 2 ADDTL 15MIN: Performed by: LEGAL MEDICINE

## 2019-09-26 PROCEDURE — 6370000000 HC RX 637 (ALT 250 FOR IP): Performed by: LEGAL MEDICINE

## 2019-09-26 PROCEDURE — 3700000001 HC ADD 15 MINUTES (ANESTHESIA): Performed by: LEGAL MEDICINE

## 2019-09-26 PROCEDURE — 2500000003 HC RX 250 WO HCPCS: Performed by: NURSE ANESTHETIST, CERTIFIED REGISTERED

## 2019-09-26 PROCEDURE — G0378 HOSPITAL OBSERVATION PER HR: HCPCS

## 2019-09-26 RX ORDER — SUCCINYLCHOLINE/SOD CL,ISO/PF 200MG/10ML
SYRINGE (ML) INTRAVENOUS PRN
Status: DISCONTINUED | OUTPATIENT
Start: 2019-09-26 | End: 2019-09-26 | Stop reason: SDUPTHER

## 2019-09-26 RX ORDER — ONDANSETRON 2 MG/ML
INJECTION INTRAMUSCULAR; INTRAVENOUS PRN
Status: DISCONTINUED | OUTPATIENT
Start: 2019-09-26 | End: 2019-09-26 | Stop reason: SDUPTHER

## 2019-09-26 RX ORDER — MEPERIDINE HYDROCHLORIDE 50 MG/ML
12.5 INJECTION INTRAMUSCULAR; INTRAVENOUS; SUBCUTANEOUS EVERY 5 MIN PRN
Status: DISCONTINUED | OUTPATIENT
Start: 2019-09-26 | End: 2019-09-26 | Stop reason: HOSPADM

## 2019-09-26 RX ORDER — DEXAMETHASONE SODIUM PHOSPHATE 10 MG/ML
INJECTION, SOLUTION INTRAMUSCULAR; INTRAVENOUS PRN
Status: DISCONTINUED | OUTPATIENT
Start: 2019-09-26 | End: 2019-09-26 | Stop reason: SDUPTHER

## 2019-09-26 RX ORDER — HYDROMORPHONE HYDROCHLORIDE 1 MG/ML
0.5 INJECTION, SOLUTION INTRAMUSCULAR; INTRAVENOUS; SUBCUTANEOUS EVERY 5 MIN PRN
Status: DISCONTINUED | OUTPATIENT
Start: 2019-09-26 | End: 2019-09-26 | Stop reason: HOSPADM

## 2019-09-26 RX ORDER — MISOPROSTOL 100 UG/1
TABLET ORAL PRN
Status: DISCONTINUED | OUTPATIENT
Start: 2019-09-26 | End: 2019-09-26 | Stop reason: ALTCHOICE

## 2019-09-26 RX ORDER — HYDROMORPHONE HYDROCHLORIDE 1 MG/ML
0.25 INJECTION, SOLUTION INTRAMUSCULAR; INTRAVENOUS; SUBCUTANEOUS EVERY 5 MIN PRN
Status: DISCONTINUED | OUTPATIENT
Start: 2019-09-26 | End: 2019-09-26 | Stop reason: HOSPADM

## 2019-09-26 RX ORDER — CIPROFLOXACIN 2 MG/ML
400 INJECTION, SOLUTION INTRAVENOUS
Status: COMPLETED | OUTPATIENT
Start: 2019-09-26 | End: 2019-09-26

## 2019-09-26 RX ORDER — ROCURONIUM BROMIDE 10 MG/ML
INJECTION, SOLUTION INTRAVENOUS PRN
Status: DISCONTINUED | OUTPATIENT
Start: 2019-09-26 | End: 2019-09-26 | Stop reason: SDUPTHER

## 2019-09-26 RX ORDER — SODIUM CHLORIDE 0.9 % (FLUSH) 0.9 %
10 SYRINGE (ML) INJECTION PRN
Status: DISCONTINUED | OUTPATIENT
Start: 2019-09-26 | End: 2019-09-26 | Stop reason: HOSPADM

## 2019-09-26 RX ORDER — ONDANSETRON 2 MG/ML
4 INJECTION INTRAMUSCULAR; INTRAVENOUS
Status: DISCONTINUED | OUTPATIENT
Start: 2019-09-26 | End: 2019-09-26 | Stop reason: HOSPADM

## 2019-09-26 RX ORDER — MISOPROSTOL 200 UG/1
800 TABLET ORAL
Status: DISPENSED | OUTPATIENT
Start: 2019-09-26 | End: 2019-09-27

## 2019-09-26 RX ORDER — METHYLERGONOVINE MALEATE 0.2 MG/ML
INJECTION INTRAVENOUS PRN
Status: DISCONTINUED | OUTPATIENT
Start: 2019-09-26 | End: 2019-09-26 | Stop reason: SDUPTHER

## 2019-09-26 RX ORDER — FENTANYL CITRATE 50 UG/ML
INJECTION, SOLUTION INTRAMUSCULAR; INTRAVENOUS PRN
Status: DISCONTINUED | OUTPATIENT
Start: 2019-09-26 | End: 2019-09-26 | Stop reason: SDUPTHER

## 2019-09-26 RX ORDER — SODIUM CHLORIDE, SODIUM LACTATE, POTASSIUM CHLORIDE, CALCIUM CHLORIDE 600; 310; 30; 20 MG/100ML; MG/100ML; MG/100ML; MG/100ML
INJECTION, SOLUTION INTRAVENOUS CONTINUOUS
Status: DISCONTINUED | OUTPATIENT
Start: 2019-09-26 | End: 2019-09-27 | Stop reason: HOSPADM

## 2019-09-26 RX ORDER — SODIUM CHLORIDE 0.9 % (FLUSH) 0.9 %
10 SYRINGE (ML) INJECTION EVERY 12 HOURS SCHEDULED
Status: DISCONTINUED | OUTPATIENT
Start: 2019-09-26 | End: 2019-09-26 | Stop reason: HOSPADM

## 2019-09-26 RX ADMIN — METHYLERGONOVINE MALEATE 200 MCG: 0.2 INJECTION, SOLUTION INTRAMUSCULAR; INTRAVENOUS at 19:01

## 2019-09-26 RX ADMIN — CIPROFLOXACIN 400 MG: 2 INJECTION, SOLUTION INTRAVENOUS at 17:53

## 2019-09-26 RX ADMIN — SODIUM CHLORIDE, POTASSIUM CHLORIDE, SODIUM LACTATE AND CALCIUM CHLORIDE: 600; 310; 30; 20 INJECTION, SOLUTION INTRAVENOUS at 17:00

## 2019-09-26 RX ADMIN — FENTANYL CITRATE 100 MCG: 50 INJECTION, SOLUTION INTRAMUSCULAR; INTRAVENOUS at 18:55

## 2019-09-26 RX ADMIN — ONDANSETRON HYDROCHLORIDE 4 MG: 2 INJECTION, SOLUTION INTRAMUSCULAR; INTRAVENOUS at 19:03

## 2019-09-26 RX ADMIN — DEXAMETHASONE SODIUM PHOSPHATE 10 MG: 10 INJECTION, SOLUTION INTRAMUSCULAR; INTRAVENOUS at 19:03

## 2019-09-26 RX ADMIN — Medication 5 MG: at 19:01

## 2019-09-26 RX ADMIN — Medication 140 MG: at 19:00

## 2019-09-26 ASSESSMENT — PULMONARY FUNCTION TESTS
PIF_VALUE: 2
PIF_VALUE: 17
PIF_VALUE: 1
PIF_VALUE: 1
PIF_VALUE: 18
PIF_VALUE: 5
PIF_VALUE: 4
PIF_VALUE: 2
PIF_VALUE: 15
PIF_VALUE: 2
PIF_VALUE: 18
PIF_VALUE: 1
PIF_VALUE: 17
PIF_VALUE: 18
PIF_VALUE: 1
PIF_VALUE: 15
PIF_VALUE: 16
PIF_VALUE: 18
PIF_VALUE: 16
PIF_VALUE: 3
PIF_VALUE: 1
PIF_VALUE: 15
PIF_VALUE: 18
PIF_VALUE: 14
PIF_VALUE: 13

## 2019-09-26 ASSESSMENT — PAIN DESCRIPTION - FREQUENCY: FREQUENCY: INTERMITTENT

## 2019-09-26 ASSESSMENT — PAIN SCALES - GENERAL
PAINLEVEL_OUTOF10: 0
PAINLEVEL_OUTOF10: 0
PAINLEVEL_OUTOF10: 1

## 2019-09-26 ASSESSMENT — PAIN DESCRIPTION - PAIN TYPE: TYPE: SURGICAL PAIN

## 2019-09-26 ASSESSMENT — PAIN DESCRIPTION - DESCRIPTORS: DESCRIPTORS: CRAMPING;DISCOMFORT

## 2019-09-26 ASSESSMENT — PAIN DESCRIPTION - LOCATION: LOCATION: ABDOMEN

## 2019-09-27 VITALS
DIASTOLIC BLOOD PRESSURE: 57 MMHG | HEIGHT: 67 IN | RESPIRATION RATE: 16 BRPM | WEIGHT: 180 LBS | SYSTOLIC BLOOD PRESSURE: 102 MMHG | OXYGEN SATURATION: 99 % | TEMPERATURE: 98.2 F | BODY MASS INDEX: 28.25 KG/M2 | HEART RATE: 76 BPM

## 2019-09-27 PROCEDURE — 90686 IIV4 VACC NO PRSV 0.5 ML IM: CPT | Performed by: LEGAL MEDICINE

## 2019-09-27 PROCEDURE — 6360000002 HC RX W HCPCS: Performed by: LEGAL MEDICINE

## 2019-09-27 PROCEDURE — G0378 HOSPITAL OBSERVATION PER HR: HCPCS

## 2019-09-27 PROCEDURE — 6370000000 HC RX 637 (ALT 250 FOR IP): Performed by: LEGAL MEDICINE

## 2019-09-27 PROCEDURE — G0008 ADMIN INFLUENZA VIRUS VAC: HCPCS | Performed by: LEGAL MEDICINE

## 2019-09-27 RX ORDER — ACETAMINOPHEN 325 MG/1
650 TABLET ORAL EVERY 4 HOURS PRN
Status: DISCONTINUED | OUTPATIENT
Start: 2019-09-27 | End: 2019-09-27 | Stop reason: HOSPADM

## 2019-09-27 RX ORDER — SODIUM CHLORIDE 0.9 % (FLUSH) 0.9 %
10 SYRINGE (ML) INJECTION PRN
Status: DISCONTINUED | OUTPATIENT
Start: 2019-09-27 | End: 2019-09-27 | Stop reason: HOSPADM

## 2019-09-27 RX ORDER — ONDANSETRON 2 MG/ML
4 INJECTION INTRAMUSCULAR; INTRAVENOUS EVERY 6 HOURS PRN
Status: DISCONTINUED | OUTPATIENT
Start: 2019-09-27 | End: 2019-09-27 | Stop reason: HOSPADM

## 2019-09-27 RX ORDER — OXYCODONE HYDROCHLORIDE AND ACETAMINOPHEN 5; 325 MG/1; MG/1
1 TABLET ORAL EVERY 4 HOURS PRN
Status: DISCONTINUED | OUTPATIENT
Start: 2019-09-27 | End: 2019-09-27 | Stop reason: HOSPADM

## 2019-09-27 RX ORDER — SODIUM CHLORIDE 0.9 % (FLUSH) 0.9 %
10 SYRINGE (ML) INJECTION EVERY 12 HOURS SCHEDULED
Status: DISCONTINUED | OUTPATIENT
Start: 2019-09-27 | End: 2019-09-27 | Stop reason: HOSPADM

## 2019-09-27 RX ORDER — DOCUSATE SODIUM 100 MG/1
100 CAPSULE, LIQUID FILLED ORAL 2 TIMES DAILY
Status: DISCONTINUED | OUTPATIENT
Start: 2019-09-27 | End: 2019-09-27 | Stop reason: HOSPADM

## 2019-09-27 RX ORDER — OXYCODONE HYDROCHLORIDE AND ACETAMINOPHEN 5; 325 MG/1; MG/1
2 TABLET ORAL EVERY 4 HOURS PRN
Status: DISCONTINUED | OUTPATIENT
Start: 2019-09-27 | End: 2019-09-27 | Stop reason: HOSPADM

## 2019-09-27 RX ORDER — SODIUM CHLORIDE, SODIUM LACTATE, POTASSIUM CHLORIDE, CALCIUM CHLORIDE 600; 310; 30; 20 MG/100ML; MG/100ML; MG/100ML; MG/100ML
INJECTION, SOLUTION INTRAVENOUS CONTINUOUS
Status: DISCONTINUED | OUTPATIENT
Start: 2019-09-27 | End: 2019-09-27 | Stop reason: HOSPADM

## 2019-09-27 RX ADMIN — DOCUSATE SODIUM 100 MG: 100 CAPSULE, LIQUID FILLED ORAL at 08:19

## 2019-09-27 RX ADMIN — INFLUENZA A VIRUS A/BRISBANE/02/2018 IVR-190 (H1N1) ANTIGEN (PROPIOLACTONE INACTIVATED), INFLUENZA A VIRUS A/KANSAS/14/2017 X-327 (H3N2) ANTIGEN (PROPIOLACTONE INACTIVATED), INFLUENZA B VIRUS B/MARYLAND/15/2016 ANTIGEN (PROPIOLACTONE INACTIVATED), INFLUENZA B VIRUS B/PHUKET/3073/2013 BVR-1B ANTIGEN (PROPIOLACTONE INACTIVATED) 0.5 ML: 15; 15; 15; 15 INJECTION, SUSPENSION INTRAMUSCULAR at 09:41

## 2019-09-27 ASSESSMENT — PAIN - FUNCTIONAL ASSESSMENT: PAIN_FUNCTIONAL_ASSESSMENT: 0-10

## 2019-11-16 ENCOUNTER — HOSPITAL ENCOUNTER (EMERGENCY)
Age: 24
Discharge: HOME | End: 2019-11-16
Payer: COMMERCIAL

## 2019-11-16 VITALS
RESPIRATION RATE: 16 BRPM | HEIGHT: 67 IN | OXYGEN SATURATION: 100 % | DIASTOLIC BLOOD PRESSURE: 63 MMHG | BODY MASS INDEX: 28.18 KG/M2 | SYSTOLIC BLOOD PRESSURE: 105 MMHG | TEMPERATURE: 98.2 F | HEART RATE: 80 BPM | WEIGHT: 179.56 LBS

## 2019-11-16 DIAGNOSIS — N93.8 DYSFUNCTIONAL UTERINE BLEEDING: Primary | ICD-10-CM

## 2019-11-16 LAB
ALBUMIN SERPL-MCNC: 4.5 G/DL (ref 3.5–5.2)
ALP BLD-CCNC: 53 U/L (ref 35–104)
ALT SERPL-CCNC: 10 U/L (ref 0–32)
ANION GAP SERPL CALCULATED.3IONS-SCNC: 11 MMOL/L (ref 7–16)
AST SERPL-CCNC: 18 U/L (ref 0–31)
BACTERIA: ABNORMAL /HPF
BASOPHILS ABSOLUTE: 0.02 E9/L (ref 0–0.2)
BASOPHILS RELATIVE PERCENT: 0.4 % (ref 0–2)
BILIRUB SERPL-MCNC: 0.3 MG/DL (ref 0–1.2)
BILIRUBIN URINE: NEGATIVE
BLOOD, URINE: ABNORMAL
BUN BLDV-MCNC: 10 MG/DL (ref 6–20)
CALCIUM SERPL-MCNC: 9.3 MG/DL (ref 8.6–10.2)
CHLORIDE BLD-SCNC: 104 MMOL/L (ref 98–107)
CLARITY: CLEAR
CO2: 25 MMOL/L (ref 22–29)
COLOR: YELLOW
CREAT SERPL-MCNC: 0.7 MG/DL (ref 0.5–1)
EOSINOPHILS ABSOLUTE: 0.08 E9/L (ref 0.05–0.5)
EOSINOPHILS RELATIVE PERCENT: 1.8 % (ref 0–6)
GFR AFRICAN AMERICAN: >60
GFR NON-AFRICAN AMERICAN: >60 ML/MIN/1.73
GLUCOSE BLD-MCNC: 91 MG/DL (ref 74–99)
GLUCOSE URINE: NEGATIVE MG/DL
HCG, URINE, POC: NEGATIVE
HCT VFR BLD CALC: 31.7 % (ref 34–48)
HEMOGLOBIN: 9.5 G/DL (ref 11.5–15.5)
IMMATURE GRANULOCYTES #: 0.01 E9/L
IMMATURE GRANULOCYTES %: 0.2 % (ref 0–5)
KETONES, URINE: NEGATIVE MG/DL
LEUKOCYTE ESTERASE, URINE: NEGATIVE
LYMPHOCYTES ABSOLUTE: 1.24 E9/L (ref 1.5–4)
LYMPHOCYTES RELATIVE PERCENT: 27.2 % (ref 20–42)
Lab: NORMAL
MCH RBC QN AUTO: 25.9 PG (ref 26–35)
MCHC RBC AUTO-ENTMCNC: 30 % (ref 32–34.5)
MCV RBC AUTO: 86.4 FL (ref 80–99.9)
MONOCYTES ABSOLUTE: 0.43 E9/L (ref 0.1–0.95)
MONOCYTES RELATIVE PERCENT: 9.4 % (ref 2–12)
NEGATIVE QC PASS/FAIL: NORMAL
NEUTROPHILS ABSOLUTE: 2.78 E9/L (ref 1.8–7.3)
NEUTROPHILS RELATIVE PERCENT: 61 % (ref 43–80)
NITRITE, URINE: NEGATIVE
PDW BLD-RTO: 15 FL (ref 11.5–15)
PH UA: 7.5 (ref 5–9)
PLATELET # BLD: 242 E9/L (ref 130–450)
PMV BLD AUTO: 10.1 FL (ref 7–12)
POSITIVE QC PASS/FAIL: NORMAL
POTASSIUM SERPL-SCNC: 4.1 MMOL/L (ref 3.5–5)
PROTEIN UA: NEGATIVE MG/DL
RBC # BLD: 3.67 E12/L (ref 3.5–5.5)
RBC UA: ABNORMAL /HPF (ref 0–2)
SODIUM BLD-SCNC: 140 MMOL/L (ref 132–146)
SPECIFIC GRAVITY UA: 1.01 (ref 1–1.03)
TOTAL PROTEIN: 7.4 G/DL (ref 6.4–8.3)
UROBILINOGEN, URINE: 0.2 E.U./DL
WBC # BLD: 4.6 E9/L (ref 4.5–11.5)
WBC UA: ABNORMAL /HPF (ref 0–5)

## 2019-11-16 PROCEDURE — 85025 COMPLETE CBC W/AUTO DIFF WBC: CPT

## 2019-11-16 PROCEDURE — 81001 URINALYSIS AUTO W/SCOPE: CPT

## 2019-11-16 PROCEDURE — 80053 COMPREHEN METABOLIC PANEL: CPT

## 2019-11-16 PROCEDURE — 36415 COLL VENOUS BLD VENIPUNCTURE: CPT

## 2019-11-16 PROCEDURE — 99284 EMERGENCY DEPT VISIT MOD MDM: CPT

## 2019-11-16 ASSESSMENT — PAIN DESCRIPTION - LOCATION: LOCATION: ABDOMEN

## 2019-11-16 ASSESSMENT — PAIN SCALES - GENERAL: PAINLEVEL_OUTOF10: 5

## 2019-11-16 ASSESSMENT — PAIN DESCRIPTION - PAIN TYPE: TYPE: ACUTE PAIN

## 2019-11-16 ASSESSMENT — PAIN DESCRIPTION - FREQUENCY: FREQUENCY: INTERMITTENT

## 2019-11-16 ASSESSMENT — PAIN DESCRIPTION - ORIENTATION: ORIENTATION: LOWER

## 2019-11-16 ASSESSMENT — PAIN DESCRIPTION - DESCRIPTORS: DESCRIPTORS: ACHING;SHARP

## 2020-01-11 ENCOUNTER — APPOINTMENT (OUTPATIENT)
Dept: ULTRASOUND IMAGING | Age: 25
End: 2020-01-11
Payer: COMMERCIAL

## 2020-01-11 ENCOUNTER — HOSPITAL ENCOUNTER (EMERGENCY)
Age: 25
Discharge: HOME | End: 2020-01-11
Attending: EMERGENCY MEDICINE
Payer: COMMERCIAL

## 2020-01-11 VITALS
HEIGHT: 67 IN | SYSTOLIC BLOOD PRESSURE: 102 MMHG | BODY MASS INDEX: 27.47 KG/M2 | DIASTOLIC BLOOD PRESSURE: 78 MMHG | RESPIRATION RATE: 18 BRPM | OXYGEN SATURATION: 100 % | TEMPERATURE: 98.1 F | WEIGHT: 175 LBS | HEART RATE: 84 BPM

## 2020-01-11 LAB
ANION GAP SERPL CALCULATED.3IONS-SCNC: 10 MMOL/L (ref 7–16)
BACTERIA: ABNORMAL /HPF
BASOPHILS ABSOLUTE: 0.01 E9/L (ref 0–0.2)
BASOPHILS RELATIVE PERCENT: 0.2 % (ref 0–2)
BILIRUBIN URINE: NEGATIVE
BLOOD, URINE: NEGATIVE
BUN BLDV-MCNC: 13 MG/DL (ref 6–20)
CALCIUM SERPL-MCNC: 9 MG/DL (ref 8.6–10.2)
CHLORIDE BLD-SCNC: 104 MMOL/L (ref 98–107)
CLARITY: ABNORMAL
CO2: 25 MMOL/L (ref 22–29)
COLOR: YELLOW
CREAT SERPL-MCNC: 0.8 MG/DL (ref 0.5–1)
CRYSTALS, UA: ABNORMAL
EOSINOPHILS ABSOLUTE: 0.05 E9/L (ref 0.05–0.5)
EOSINOPHILS RELATIVE PERCENT: 0.9 % (ref 0–6)
EPITHELIAL CELLS, UA: ABNORMAL /HPF
GFR AFRICAN AMERICAN: >60
GFR NON-AFRICAN AMERICAN: >60 ML/MIN/1.73
GLUCOSE BLD-MCNC: 96 MG/DL (ref 74–99)
GLUCOSE URINE: NEGATIVE MG/DL
GONADOTROPIN, CHORIONIC (HCG) QUANT: 37.1 MIU/ML
HCT VFR BLD CALC: 30.4 % (ref 34–48)
HEMOGLOBIN: 9.1 G/DL (ref 11.5–15.5)
IMMATURE GRANULOCYTES #: 0.01 E9/L
IMMATURE GRANULOCYTES %: 0.2 % (ref 0–5)
KETONES, URINE: NEGATIVE MG/DL
LEUKOCYTE ESTERASE, URINE: ABNORMAL
LYMPHOCYTES ABSOLUTE: 1.56 E9/L (ref 1.5–4)
LYMPHOCYTES RELATIVE PERCENT: 28.6 % (ref 20–42)
MCH RBC QN AUTO: 24.3 PG (ref 26–35)
MCHC RBC AUTO-ENTMCNC: 29.9 % (ref 32–34.5)
MCV RBC AUTO: 81.1 FL (ref 80–99.9)
MONOCYTES ABSOLUTE: 0.6 E9/L (ref 0.1–0.95)
MONOCYTES RELATIVE PERCENT: 11 % (ref 2–12)
NEUTROPHILS ABSOLUTE: 3.22 E9/L (ref 1.8–7.3)
NEUTROPHILS RELATIVE PERCENT: 59.1 % (ref 43–80)
NITRITE, URINE: NEGATIVE
PDW BLD-RTO: 17.8 FL (ref 11.5–15)
PH UA: 5.5 (ref 5–9)
PLATELET # BLD: 268 E9/L (ref 130–450)
PMV BLD AUTO: 10.1 FL (ref 7–12)
POTASSIUM REFLEX MAGNESIUM: 4.3 MMOL/L (ref 3.5–5)
PROTEIN UA: NEGATIVE MG/DL
RBC # BLD: 3.75 E12/L (ref 3.5–5.5)
RBC UA: ABNORMAL /HPF (ref 0–2)
SODIUM BLD-SCNC: 139 MMOL/L (ref 132–146)
SPECIFIC GRAVITY UA: 1.02 (ref 1–1.03)
UROBILINOGEN, URINE: 1 E.U./DL
WBC # BLD: 5.5 E9/L (ref 4.5–11.5)
WBC UA: ABNORMAL /HPF (ref 0–5)

## 2020-01-11 PROCEDURE — 84702 CHORIONIC GONADOTROPIN TEST: CPT

## 2020-01-11 PROCEDURE — 80048 BASIC METABOLIC PNL TOTAL CA: CPT

## 2020-01-11 PROCEDURE — 85025 COMPLETE CBC W/AUTO DIFF WBC: CPT

## 2020-01-11 PROCEDURE — 76817 TRANSVAGINAL US OBSTETRIC: CPT

## 2020-01-11 PROCEDURE — 36415 COLL VENOUS BLD VENIPUNCTURE: CPT

## 2020-01-11 PROCEDURE — 76705 ECHO EXAM OF ABDOMEN: CPT

## 2020-01-11 PROCEDURE — 81001 URINALYSIS AUTO W/SCOPE: CPT

## 2020-01-11 PROCEDURE — 99284 EMERGENCY DEPT VISIT MOD MDM: CPT

## 2020-01-11 ASSESSMENT — PAIN DESCRIPTION - PAIN TYPE: TYPE: ACUTE PAIN

## 2020-01-11 ASSESSMENT — PAIN DESCRIPTION - ORIENTATION: ORIENTATION: MID

## 2020-01-11 ASSESSMENT — PAIN DESCRIPTION - LOCATION: LOCATION: ABDOMEN

## 2020-01-11 ASSESSMENT — PAIN DESCRIPTION - DESCRIPTORS: DESCRIPTORS: THROBBING

## 2020-01-11 ASSESSMENT — PAIN SCALES - GENERAL: PAINLEVEL_OUTOF10: 7

## 2020-01-12 ASSESSMENT — ENCOUNTER SYMPTOMS
DIARRHEA: 0
CHOKING: 0
WHEEZING: 0
BACK PAIN: 0
CHEST TIGHTNESS: 0
CONSTIPATION: 0
VOMITING: 0
SHORTNESS OF BREATH: 0
NAUSEA: 0
COLOR CHANGE: 0
ABDOMINAL PAIN: 1
EYE REDNESS: 0

## 2020-01-13 NOTE — ED PROVIDER NOTES
Patient is a  15-year-old female,  A1 who presents the ER today with chief complaint of abdominal pain, states that she also had a positive pregnancy test at home. She localizes the pain to the right lower quadrant, states that the pain is been ongoing for approximately 1 day. States that she was made sure if she is pregnant, and also wants to make sure that everything is okay with the pregnancy if she is given that she had a miscarriage in the past.  She denies any vaginal bleeding or pelvic pain at this time. Denies any headache, dizziness, chest pain, cough, fever, chills, nausea, vomiting, diarrhea, constipation, or urinary symptoms. She states that her last period was last month, she cannot remember when it started exactky, however she states that she feels she has late. The history is provided by the patient. Review of Systems   Constitutional: Negative for chills and fever. HENT: Negative for drooling, nosebleeds and sneezing. Eyes: Negative for redness. Respiratory: Negative for choking, chest tightness, shortness of breath and wheezing. Cardiovascular: Negative for chest pain and palpitations. Gastrointestinal: Positive for abdominal pain. Negative for constipation, diarrhea, nausea and vomiting. Genitourinary: Negative for dysuria, hematuria, menstrual problem, pelvic pain, vaginal bleeding, vaginal discharge and vaginal pain. Musculoskeletal: Negative for back pain and neck pain. Skin: Negative for color change and wound. Allergic/Immunologic: Negative for environmental allergies and immunocompromised state. Neurological: Negative for headaches. Psychiatric/Behavioral: Negative for confusion. Physical Exam  Constitutional:       General: She is awake. Appearance: Normal appearance. She is well-developed and overweight. HENT:      Head: Normocephalic and atraumatic. Jaw: There is normal jaw occlusion.       Right Ear: Hearing and external ear both negative for appendicitis and an identifiable pregnancy in utero or ectopic. As patient may be pregnant, I will not CT her abdomen for stones. Spoke to patient and confirmed these results to her, informed her that she had a positive beta quant, however it was only very slightly positive but she did follow with her OB/GYN, Dr. Lisa Rankin on Monday for repeat beta quant. She is agreeable to this at this time. ED Course as of 2105   [facility]  Dilated    [DS]      ED Course User Index  [DS] Federico DO Keily          ED Course as of 2105   [facility]  Dilated    [DS]      ED Course User Index  [DS] Federico Emigdiodamien        --------------------------------------------- PAST HISTORY ---------------------------------------------  Past Medical History:  has a past medical history of Depression, Endometriosis of pelvis, and Prolonged emergence from general anesthesia. Past Surgical History:  has a past surgical history that includes Tonsillectomy; Myringotomy Tympanostomy Tube Placement; Middle ear surgery;  section; laparoscopy (2017); pr lap,lysis of adhesions (N/A, 2018); and Dilation and curettage of uterus (N/A, 2019). Social History:  reports that she has never smoked. She has never used smokeless tobacco. She reports that she does not drink alcohol or use drugs. Family History: family history is not on file. The patients home medications have been reviewed.     Allergies: Pcn [penicillins]    -------------------------------------------------- RESULTS -------------------------------------------------  Labs:  Results for orders placed or performed during the hospital encounter of 20   Urinalysis with Microscopic   Result Value Ref Range    Color, UA Yellow Straw/Yellow    Clarity, UA SLCLOUDY Clear    Glucose, Ur Negative Negative mg/dL    Bilirubin Urine Negative Negative    Ketones, Urine Negative Negative mg/dL    Specific beta hCG and if indicated follow-up pelvic   ultrasound is recommended. US ABDOMEN LIMITED   Final Result   1. No ultrasound evidence of acute appendicitis. 2. Partially contracted gallbladder with normal caliber common bile   duct.             ------------------------- NURSING NOTES AND VITALS REVIEWED ---------------------------  Date / Time Roomed:  1/11/2020  6:55 PM  ED Bed Assignment:  21/21    The nursing notes within the ED encounter and vital signs as below have been reviewed. /78   Pulse 84   Temp 98.1 °F (36.7 °C)   Resp 18   Ht 5' 7\" (1.702 m)   Wt 175 lb (79.4 kg)   LMP 12/01/2019 (Approximate)   SpO2 100%   BMI 27.41 kg/m²   Oxygen Saturation Interpretation: Normal      ------------------------------------------ PROGRESS NOTES ------------------------------------------  8:08 PM  I have spoken with the patient and discussed todays results, in addition to providing specific details for the plan of care and counseling regarding the diagnosis and prognosis. Their questions are answered at this time and they are agreeable with the plan. I discussed at length with them reasons for immediate return here for re evaluation. They will followup with their OB/GYN by calling their office on Monday.      --------------------------------- ADDITIONAL PROVIDER NOTES ---------------------------------  At this time the patient is without objective evidence of an acute process requiring hospitalization or inpatient management. They have remained hemodynamically stable throughout their entire ED visit and are stable for discharge with outpatient follow-up. The plan has been discussed in detail and they are aware of the specific conditions for emergent return, as well as the importance of follow-up. Discharge Medication List as of 1/11/2020 10:22 PM          Diagnosis:  1. Right lower quadrant abdominal pain    2.  Elevated serum hCG        Disposition:  Patient's disposition: Discharge to home  Patient's condition is stable. Marlena Adams DO  Resident  01/12/20 2008      ATTENDING PROVIDER ATTESTATION:     I have personally performed and/or participated in the history, exam, medical decision making, and procedures and agree with all pertinent clinical information. I have also reviewed and agree with the past medical, family and social history unless otherwise noted. I have discussed this patient in detail with the resident, and provided the instruction and education regarding abdominal pain in early pregnancy. My findings/Plan: Heart RRR. Lungs CTA bilaterally. Abdomen soft. Mild RLQ tenderness. Bowel sounds normal. Supportive care. Discharge for outpatient follow up.          [address] [facility],   01/14/20 1396

## 2020-01-14 ENCOUNTER — HOSPITAL ENCOUNTER (EMERGENCY)
Age: 25
Discharge: HOME | End: 2020-01-15
Payer: COMMERCIAL

## 2020-01-14 VITALS
WEIGHT: 175 LBS | HEART RATE: 85 BPM | TEMPERATURE: 97.8 F | RESPIRATION RATE: 18 BRPM | DIASTOLIC BLOOD PRESSURE: 69 MMHG | OXYGEN SATURATION: 100 % | BODY MASS INDEX: 27.47 KG/M2 | SYSTOLIC BLOOD PRESSURE: 113 MMHG | HEIGHT: 67 IN

## 2020-01-14 LAB
ANION GAP SERPL CALCULATED.3IONS-SCNC: 11 MMOL/L (ref 7–16)
BASOPHILS ABSOLUTE: 0.02 E9/L (ref 0–0.2)
BASOPHILS RELATIVE PERCENT: 0.3 % (ref 0–2)
BUN BLDV-MCNC: 10 MG/DL (ref 6–20)
CALCIUM SERPL-MCNC: 9.7 MG/DL (ref 8.6–10.2)
CHLORIDE BLD-SCNC: 103 MMOL/L (ref 98–107)
CO2: 25 MMOL/L (ref 22–29)
CREAT SERPL-MCNC: 0.7 MG/DL (ref 0.5–1)
EOSINOPHILS ABSOLUTE: 0.05 E9/L (ref 0.05–0.5)
EOSINOPHILS RELATIVE PERCENT: 0.7 % (ref 0–6)
GFR AFRICAN AMERICAN: >60
GFR NON-AFRICAN AMERICAN: >60 ML/MIN/1.73
GLUCOSE BLD-MCNC: 101 MG/DL (ref 74–99)
GONADOTROPIN, CHORIONIC (HCG) QUANT: 330 MIU/ML
HCT VFR BLD CALC: 32.8 % (ref 34–48)
HEMOGLOBIN: 9.8 G/DL (ref 11.5–15.5)
IMMATURE GRANULOCYTES #: 0.02 E9/L
IMMATURE GRANULOCYTES %: 0.3 % (ref 0–5)
LYMPHOCYTES ABSOLUTE: 1.99 E9/L (ref 1.5–4)
LYMPHOCYTES RELATIVE PERCENT: 26 % (ref 20–42)
MCH RBC QN AUTO: 24.3 PG (ref 26–35)
MCHC RBC AUTO-ENTMCNC: 29.9 % (ref 32–34.5)
MCV RBC AUTO: 81.2 FL (ref 80–99.9)
MONOCYTES ABSOLUTE: 0.81 E9/L (ref 0.1–0.95)
MONOCYTES RELATIVE PERCENT: 10.6 % (ref 2–12)
NEUTROPHILS ABSOLUTE: 4.75 E9/L (ref 1.8–7.3)
NEUTROPHILS RELATIVE PERCENT: 62.1 % (ref 43–80)
PDW BLD-RTO: 18.2 FL (ref 11.5–15)
PLATELET # BLD: 300 E9/L (ref 130–450)
PMV BLD AUTO: 10.1 FL (ref 7–12)
POTASSIUM SERPL-SCNC: 4.3 MMOL/L (ref 3.5–5)
RBC # BLD: 4.04 E12/L (ref 3.5–5.5)
SODIUM BLD-SCNC: 139 MMOL/L (ref 132–146)
WBC # BLD: 7.6 E9/L (ref 4.5–11.5)

## 2020-01-14 PROCEDURE — 36415 COLL VENOUS BLD VENIPUNCTURE: CPT

## 2020-01-14 PROCEDURE — 81001 URINALYSIS AUTO W/SCOPE: CPT

## 2020-01-14 PROCEDURE — 84702 CHORIONIC GONADOTROPIN TEST: CPT

## 2020-01-14 PROCEDURE — 85025 COMPLETE CBC W/AUTO DIFF WBC: CPT

## 2020-01-14 PROCEDURE — 80048 BASIC METABOLIC PNL TOTAL CA: CPT

## 2020-01-14 PROCEDURE — 99284 EMERGENCY DEPT VISIT MOD MDM: CPT

## 2020-01-14 ASSESSMENT — PAIN DESCRIPTION - ONSET: ONSET: ON-GOING

## 2020-01-14 ASSESSMENT — PAIN DESCRIPTION - LOCATION: LOCATION: ABDOMEN

## 2020-01-14 ASSESSMENT — PAIN DESCRIPTION - PROGRESSION: CLINICAL_PROGRESSION: NOT CHANGED

## 2020-01-14 ASSESSMENT — PAIN DESCRIPTION - PAIN TYPE: TYPE: ACUTE PAIN

## 2020-01-14 ASSESSMENT — PAIN DESCRIPTION - ORIENTATION: ORIENTATION: RIGHT;LEFT;LOWER

## 2020-01-14 ASSESSMENT — PAIN DESCRIPTION - FREQUENCY: FREQUENCY: CONTINUOUS

## 2020-01-14 ASSESSMENT — PAIN SCALES - GENERAL: PAINLEVEL_OUTOF10: 6

## 2020-01-14 NOTE — LETTER
[facility] Emergency Department  [address] [place] [address]  Phone: [phone]             January 15, 2020    Patient: Humberto Keita   YOB: 1995   Date of Visit: 1/14/2020       To Whom It May Concern:    Pallavi Driver was seen and treated in our emergency department on 1/14/2020.  She may return to Work/School on the following date ____________      Sincerely,       Lisbeth Phillips RN         Signature:__________________________________

## 2020-01-15 LAB
BACTERIA: NORMAL /HPF
BILIRUBIN URINE: NEGATIVE
BLOOD, URINE: NEGATIVE
CLARITY: NORMAL
COLOR: YELLOW
EPITHELIAL CELLS, UA: NORMAL /HPF
GLUCOSE URINE: NEGATIVE MG/DL
KETONES, URINE: NEGATIVE MG/DL
LEUKOCYTE ESTERASE, URINE: NEGATIVE
MUCUS: PRESENT
NITRITE, URINE: NEGATIVE
PH UA: 5.5 (ref 5–9)
PROTEIN UA: NEGATIVE MG/DL
RBC UA: NORMAL /HPF (ref 0–2)
SPECIFIC GRAVITY UA: 1.02 (ref 1–1.03)
UROBILINOGEN, URINE: 0.2 E.U./DL
WBC UA: NORMAL /HPF (ref 0–5)

## 2020-01-15 RX ORDER — ACETAMINOPHEN 500 MG
1000 TABLET ORAL EVERY 6 HOURS PRN
Qty: 60 TABLET | Refills: 0 | Status: SHIPPED | OUTPATIENT
Start: 2020-01-15 | End: 2020-03-03 | Stop reason: ALTCHOICE

## 2020-01-15 NOTE — ED PROVIDER NOTES
Microscopic Urinalysis   Result Value Ref Range    Mucus, UA Present     WBC, UA 1-3 0 - 5 /HPF    RBC, UA 0-1 0 - 2 /HPF    Epi Cells FEW /HPF    Bacteria, UA NONE /HPF       RADIOLOGY:  Interpreted by Radiologist.  No orders to display       ------------------------- NURSING NOTES AND VITALS REVIEWED ---------------------------   The nursing notes within the ED encounter and vital signs as below have been reviewed. /69   Pulse 85   Temp 97.8 °F (36.6 °C) (Oral)   Resp 18   Ht 5' 7\" (1.702 m)   Wt 175 lb (79.4 kg)   LMP 12/01/2019 (Approximate)   SpO2 100%   BMI 27.41 kg/m²   Oxygen Saturation Interpretation: Normal      ---------------------------------------------------PHYSICAL EXAM--------------------------------------      Constitutional/General: Alert and oriented x3, well appearing, non toxic in NAD  Head: NC/AT  Eyes: PERRL, EOMI  Mouth: Oropharynx clear, handling secretions, no trismus  Neck: Supple, full ROM, no meningeal signs  Pulmonary: Lungs clear to auscultation bilaterally, no wheezes, rales, or rhonchi. Not in respiratory distress  Cardiovascular:  Regular rate and rhythm, no murmurs, gallops, or rubs. 2+ distal pulses  Abdomen: Soft, non tender, non distended,   Extremities: Moves all extremities x 4. Warm and well perfused  Skin: warm and dry without rash  Neurologic: GCS 15,  Psych: Normal Affect      ------------------------------ ED COURSE/MEDICAL DECISION MAKING----------------------  Medications - No data to display      Medical Decision Making:    Sleeping comfortably in bed states her symptoms have improved. Informed of hCG result increased up to 330. She did have an ultrasound done 3 days ago. Has an upcoming appointment with OB/GYN next week. Provided with a prescription for Tylenol and advised to keep her upcoming appointment with OB/GYN. Counseling:    The emergency provider has spoken with the patient and discussed todays results, in addition to providing specific details for the plan of care and counseling regarding the diagnosis and prognosis. Questions are answered at this time and they are agreeable with the plan.      --------------------------------- IMPRESSION AND DISPOSITION ---------------------------------    IMPRESSION  1. Less than 8 weeks gestation of pregnancy        DISPOSITION  Disposition: Discharge to home  Patient condition is good                 Karuna Valdez, JO-ANN - CNP  01/15/20 0039    ATTENDING PROVIDER ATTESTATION:     Supervising Physician, on-site, available for consultation, non-participatory in the evaluation or care of this patient.            [address] [facility],   01/15/20 1969

## 2020-02-10 ENCOUNTER — HOSPITAL ENCOUNTER (EMERGENCY)
Age: 25
Discharge: HOME | End: 2020-02-10
Attending: EMERGENCY MEDICINE
Payer: COMMERCIAL

## 2020-02-10 ENCOUNTER — HOSPITAL ENCOUNTER (EMERGENCY)
Age: 25
Discharge: HOME | End: 2020-02-10
Payer: COMMERCIAL

## 2020-02-10 ENCOUNTER — APPOINTMENT (OUTPATIENT)
Dept: ULTRASOUND IMAGING | Age: 25
End: 2020-02-10
Payer: COMMERCIAL

## 2020-02-10 VITALS
RESPIRATION RATE: 20 BRPM | WEIGHT: 185 LBS | HEART RATE: 96 BPM | BODY MASS INDEX: 28.98 KG/M2 | OXYGEN SATURATION: 100 % | SYSTOLIC BLOOD PRESSURE: 100 MMHG | DIASTOLIC BLOOD PRESSURE: 65 MMHG | TEMPERATURE: 97.8 F

## 2020-02-10 VITALS
HEART RATE: 82 BPM | WEIGHT: 185 LBS | TEMPERATURE: 98.5 F | OXYGEN SATURATION: 100 % | DIASTOLIC BLOOD PRESSURE: 59 MMHG | SYSTOLIC BLOOD PRESSURE: 119 MMHG | RESPIRATION RATE: 18 BRPM | BODY MASS INDEX: 29.03 KG/M2 | HEIGHT: 67 IN

## 2020-02-10 LAB
ALBUMIN SERPL-MCNC: 4.4 G/DL (ref 3.5–5.2)
ALP BLD-CCNC: 56 U/L (ref 35–104)
ALT SERPL-CCNC: 20 U/L (ref 0–32)
ANION GAP SERPL CALCULATED.3IONS-SCNC: 15 MMOL/L (ref 7–16)
AST SERPL-CCNC: 24 U/L (ref 0–31)
BACTERIA: NORMAL /HPF
BASOPHILS ABSOLUTE: 0.02 E9/L (ref 0–0.2)
BASOPHILS RELATIVE PERCENT: 0.3 % (ref 0–2)
BILIRUB SERPL-MCNC: <0.2 MG/DL (ref 0–1.2)
BILIRUBIN URINE: NEGATIVE
BLOOD, URINE: NEGATIVE
BUN BLDV-MCNC: 10 MG/DL (ref 6–20)
CALCIUM SERPL-MCNC: 9.3 MG/DL (ref 8.6–10.2)
CHLORIDE BLD-SCNC: 100 MMOL/L (ref 98–107)
CLARITY: CLEAR
CO2: 22 MMOL/L (ref 22–29)
COLOR: YELLOW
CREAT SERPL-MCNC: 0.6 MG/DL (ref 0.5–1)
CRYSTALS, UA: NORMAL /HPF
EOSINOPHILS ABSOLUTE: 0.05 E9/L (ref 0.05–0.5)
EOSINOPHILS RELATIVE PERCENT: 0.7 % (ref 0–6)
GFR AFRICAN AMERICAN: >60
GFR NON-AFRICAN AMERICAN: >60 ML/MIN/1.73
GLUCOSE BLD-MCNC: 88 MG/DL (ref 74–99)
GLUCOSE URINE: NEGATIVE MG/DL
GONADOTROPIN, CHORIONIC (HCG) QUANT: ABNORMAL MIU/ML
HCT VFR BLD CALC: 33 % (ref 34–48)
HEMOGLOBIN: 9.9 G/DL (ref 11.5–15.5)
IMMATURE GRANULOCYTES #: 0.02 E9/L
IMMATURE GRANULOCYTES %: 0.3 % (ref 0–5)
INFLUENZA A BY PCR: NOT DETECTED
INFLUENZA B BY PCR: NOT DETECTED
KETONES, URINE: NEGATIVE MG/DL
LEUKOCYTE ESTERASE, URINE: NEGATIVE
LYMPHOCYTES ABSOLUTE: 1.7 E9/L (ref 1.5–4)
LYMPHOCYTES RELATIVE PERCENT: 22.1 % (ref 20–42)
MCH RBC QN AUTO: 24.3 PG (ref 26–35)
MCHC RBC AUTO-ENTMCNC: 30 % (ref 32–34.5)
MCV RBC AUTO: 81.1 FL (ref 80–99.9)
MONOCYTES ABSOLUTE: 0.76 E9/L (ref 0.1–0.95)
MONOCYTES RELATIVE PERCENT: 9.9 % (ref 2–12)
NEUTROPHILS ABSOLUTE: 5.13 E9/L (ref 1.8–7.3)
NEUTROPHILS RELATIVE PERCENT: 66.7 % (ref 43–80)
NITRITE, URINE: NEGATIVE
PDW BLD-RTO: 19.2 FL (ref 11.5–15)
PH UA: 6 (ref 5–9)
PLATELET # BLD: 271 E9/L (ref 130–450)
PMV BLD AUTO: 10.7 FL (ref 7–12)
POTASSIUM SERPL-SCNC: 4 MMOL/L (ref 3.5–5)
PROTEIN UA: NEGATIVE MG/DL
RBC # BLD: 4.07 E12/L (ref 3.5–5.5)
RBC UA: NORMAL /HPF (ref 0–2)
SODIUM BLD-SCNC: 137 MMOL/L (ref 132–146)
SPECIFIC GRAVITY UA: >=1.03 (ref 1–1.03)
TOTAL PROTEIN: 7.8 G/DL (ref 6.4–8.3)
UROBILINOGEN, URINE: 1 E.U./DL
WBC # BLD: 7.7 E9/L (ref 4.5–11.5)
WBC UA: NORMAL /HPF (ref 0–5)

## 2020-02-10 PROCEDURE — 99212 OFFICE O/P EST SF 10 MIN: CPT

## 2020-02-10 PROCEDURE — 81001 URINALYSIS AUTO W/SCOPE: CPT

## 2020-02-10 PROCEDURE — 87502 INFLUENZA DNA AMP PROBE: CPT

## 2020-02-10 PROCEDURE — 99284 EMERGENCY DEPT VISIT MOD MDM: CPT

## 2020-02-10 PROCEDURE — 36415 COLL VENOUS BLD VENIPUNCTURE: CPT

## 2020-02-10 PROCEDURE — 84702 CHORIONIC GONADOTROPIN TEST: CPT

## 2020-02-10 PROCEDURE — 76817 TRANSVAGINAL US OBSTETRIC: CPT

## 2020-02-10 PROCEDURE — 85025 COMPLETE CBC W/AUTO DIFF WBC: CPT

## 2020-02-10 PROCEDURE — 80053 COMPREHEN METABOLIC PANEL: CPT

## 2020-02-10 ASSESSMENT — ENCOUNTER SYMPTOMS
EYE PAIN: 0
SHORTNESS OF BREATH: 0
BLOOD IN STOOL: 0
DIARRHEA: 0
SINUS PRESSURE: 0
TROUBLE SWALLOWING: 0
CHEST TIGHTNESS: 0
SORE THROAT: 0
WHEEZING: 0
ABDOMINAL DISTENTION: 0
ABDOMINAL PAIN: 1
EYE REDNESS: 0
CONSTIPATION: 0
VOMITING: 0
BACK PAIN: 0
PHOTOPHOBIA: 0
NAUSEA: 0
COUGH: 0
RHINORRHEA: 0

## 2020-02-10 NOTE — ED PROVIDER NOTES
HPI:  2/10/20, Time: 1:46 PM         Galilea Baez is a 25 y.o. female presenting to the ED for abdominal pain, nausea and vomiting, patient states she is 7 weeks pregnant. States that she has had nausea and vomiting for 2 days difficulty keeping anything down and since she has been here she developed pain all across her abdomen she said it goes up and down and across the hallway. She said she is not having any vaginal discharge or bleeding. Last menstrual period was . Review of Systems:   Pertinent positives and negatives are stated within HPI, all other systems reviewed and are negative.          --------------------------------------------- PAST HISTORY ---------------------------------------------  Past Medical History:  has a past medical history of Depression, Endometriosis of pelvis, and Prolonged emergence from general anesthesia. Past Surgical History:  has a past surgical history that includes Tonsillectomy; Myringotomy Tympanostomy Tube Placement; Middle ear surgery;  section; laparoscopy (2017); pr lap,lysis of adhesions (N/A, 2018); and Dilation and curettage of uterus (N/A, 2019). Social History:  reports that she has never smoked. She has never used smokeless tobacco. She reports that she does not drink alcohol or use drugs. Family History: family history is not on file. The patients home medications have been reviewed. Allergies: Pcn [penicillins]    -------------------------------------------------- RESULTS -------------------------------------------------  All laboratory and radiology results have been personally reviewed by myself   LABS:  No results found for this visit on 02/10/20. RADIOLOGY:  Interpreted by Radiologist.  No orders to display       ------------------------- NURSING NOTES AND VITALS REVIEWED ---------------------------   The nursing notes within the ED encounter and vital signs as below have been reviewed.    BP

## 2020-02-10 NOTE — ED PROVIDER NOTES
IUP estimated at 8w0d with FHT at 158 bpm.  Patient to be discharged home with instruction to follow-up with OB/GYN by attending prescheduled appointment tomorrow. Strict return instructions to ED were given to patient prior to disposition. All questions were answered prior to discharge and patient is agreeable to plan.           --------------------------------------------- PAST HISTORY ---------------------------------------------  Past Medical History:  has a past medical history of Depression, Endometriosis of pelvis, and Prolonged emergence from general anesthesia. Past Surgical History:  has a past surgical history that includes Tonsillectomy; Myringotomy Tympanostomy Tube Placement; Middle ear surgery;  section; laparoscopy (2017); pr lap,lysis of adhesions (N/A, 2018); and Dilation and curettage of uterus (N/A, 2019). Social History:  reports that she has never smoked. She has never used smokeless tobacco. She reports that she does not drink alcohol or use drugs. Family History: family history is not on file. The patients home medications have been reviewed.     Allergies: Pcn [penicillins]    -------------------------------------------------- RESULTS -------------------------------------------------  Labs:  Results for orders placed or performed during the hospital encounter of 02/10/20   Rapid influenza A/B antigens   Result Value Ref Range    Influenza A by PCR Not Detected Not Detected    Influenza B by PCR Not Detected Not Detected   Urinalysis with Microscopic   Result Value Ref Range    Color, UA Yellow Straw/Yellow    Clarity, UA Clear Clear    Glucose, Ur Negative Negative mg/dL    Bilirubin Urine Negative Negative    Ketones, Urine Negative Negative mg/dL    Specific Gravity, UA >=1.030 1.005 - 1.030    Blood, Urine Negative Negative    pH, UA 6.0 5.0 - 9.0    Protein, UA Negative Negative mg/dL    Urobilinogen, Urine 1.0 <2.0 E.U./dL    Nitrite, Urine Negative Negative    Leukocyte Esterase, Urine Negative Negative    WBC, UA 0-1 0 - 5 /HPF    RBC, UA NONE 0 - 2 /HPF    Bacteria, UA NONE /HPF    Crystals Rare /HPF   CBC auto differential   Result Value Ref Range    WBC 7.7 4.5 - 11.5 E9/L    RBC 4.07 3.50 - 5.50 E12/L    Hemoglobin 9.9 (L) 11.5 - 15.5 g/dL    Hematocrit 33.0 (L) 34.0 - 48.0 %    MCV 81.1 80.0 - 99.9 fL    MCH 24.3 (L) 26.0 - 35.0 pg    MCHC 30.0 (L) 32.0 - 34.5 %    RDW 19.2 (H) 11.5 - 15.0 fL    Platelets 218 493 - 772 E9/L    MPV 10.7 7.0 - 12.0 fL    Neutrophils % 66.7 43.0 - 80.0 %    Immature Granulocytes % 0.3 0.0 - 5.0 %    Lymphocytes % 22.1 20.0 - 42.0 %    Monocytes % 9.9 2.0 - 12.0 %    Eosinophils % 0.7 0.0 - 6.0 %    Basophils % 0.3 0.0 - 2.0 %    Neutrophils Absolute 5.13 1.80 - 7.30 E9/L    Immature Granulocytes # 0.02 E9/L    Lymphocytes Absolute 1.70 1.50 - 4.00 E9/L    Monocytes Absolute 0.76 0.10 - 0.95 E9/L    Eosinophils Absolute 0.05 0.05 - 0.50 E9/L    Basophils Absolute 0.02 0.00 - 0.20 E9/L   Comprehensive Metabolic Panel   Result Value Ref Range    Sodium 137 132 - 146 mmol/L    Potassium 4.0 3.5 - 5.0 mmol/L    Chloride 100 98 - 107 mmol/L    CO2 22 22 - 29 mmol/L    Anion Gap 15 7 - 16 mmol/L    Glucose 88 74 - 99 mg/dL    BUN 10 6 - 20 mg/dL    CREATININE 0.6 0.5 - 1.0 mg/dL    GFR Non-African American >60 >=60 mL/min/1.73    GFR African American >60     Calcium 9.3 8.6 - 10.2 mg/dL    Total Protein 7.8 6.4 - 8.3 g/dL    Alb 4.4 3.5 - 5.2 g/dL    Total Bilirubin <0.2 0.0 - 1.2 mg/dL    Alkaline Phosphatase 56 35 - 104 U/L    ALT 20 0 - 32 U/L    AST 24 0 - 31 U/L   hCG, quantitative, pregnancy   Result Value Ref Range    hCG Quant 703618.0 (H) <10 mIU/mL       Radiology:  US OB TRANSVAGINAL   Final Result   Single live intrauterine pregnancy identified with an   ultrasound gestational age of 8 weeks 0 days and a fetal heart rate of   158 bpm.          ------------------------- NURSING NOTES AND VITALS REVIEWED

## 2020-03-03 ENCOUNTER — APPOINTMENT (OUTPATIENT)
Dept: ULTRASOUND IMAGING | Age: 25
End: 2020-03-03
Payer: COMMERCIAL

## 2020-03-03 ENCOUNTER — HOSPITAL ENCOUNTER (EMERGENCY)
Age: 25
Discharge: HOME | End: 2020-03-03
Attending: EMERGENCY MEDICINE
Payer: COMMERCIAL

## 2020-03-03 VITALS
BODY MASS INDEX: 29.19 KG/M2 | SYSTOLIC BLOOD PRESSURE: 113 MMHG | DIASTOLIC BLOOD PRESSURE: 79 MMHG | HEIGHT: 67 IN | RESPIRATION RATE: 16 BRPM | OXYGEN SATURATION: 98 % | TEMPERATURE: 98.2 F | WEIGHT: 186 LBS | HEART RATE: 76 BPM

## 2020-03-03 LAB
ABO/RH: NORMAL
ALBUMIN SERPL-MCNC: 4 G/DL (ref 3.5–5.2)
ALP BLD-CCNC: 58 U/L (ref 35–104)
ALT SERPL-CCNC: 8 U/L (ref 0–32)
ANION GAP SERPL CALCULATED.3IONS-SCNC: 12 MMOL/L (ref 7–16)
AST SERPL-CCNC: 15 U/L (ref 0–31)
BACTERIA: ABNORMAL /HPF
BACTERIA: NORMAL /HPF
BASOPHILS ABSOLUTE: 0.02 E9/L (ref 0–0.2)
BASOPHILS RELATIVE PERCENT: 0.3 % (ref 0–2)
BILIRUB SERPL-MCNC: 0.2 MG/DL (ref 0–1.2)
BILIRUBIN URINE: NEGATIVE
BILIRUBIN URINE: NEGATIVE
BLOOD, URINE: ABNORMAL
BLOOD, URINE: NEGATIVE
BUN BLDV-MCNC: 8 MG/DL (ref 6–20)
CALCIUM SERPL-MCNC: 9.5 MG/DL (ref 8.6–10.2)
CHLORIDE BLD-SCNC: 99 MMOL/L (ref 98–107)
CLARITY: ABNORMAL
CLARITY: CLEAR
CO2: 23 MMOL/L (ref 22–29)
COLOR: NORMAL
COLOR: YELLOW
CREAT SERPL-MCNC: 0.6 MG/DL (ref 0.5–1)
EOSINOPHILS ABSOLUTE: 0.04 E9/L (ref 0.05–0.5)
EOSINOPHILS RELATIVE PERCENT: 0.6 % (ref 0–6)
EPITHELIAL CELLS, UA: ABNORMAL /HPF
EPITHELIAL CELLS, UA: NORMAL /HPF
GFR AFRICAN AMERICAN: >60
GFR NON-AFRICAN AMERICAN: >60 ML/MIN/1.73
GLUCOSE BLD-MCNC: 92 MG/DL (ref 74–99)
GLUCOSE URINE: NEGATIVE MG/DL
GLUCOSE URINE: NEGATIVE MG/DL
GONADOTROPIN, CHORIONIC (HCG) QUANT: ABNORMAL MIU/ML
HCT VFR BLD CALC: 30.6 % (ref 34–48)
HEMOGLOBIN: 9.5 G/DL (ref 11.5–15.5)
IMMATURE GRANULOCYTES #: 0.02 E9/L
IMMATURE GRANULOCYTES %: 0.3 % (ref 0–5)
KETONES, URINE: NEGATIVE MG/DL
KETONES, URINE: NEGATIVE MG/DL
LEUKOCYTE ESTERASE, URINE: ABNORMAL
LEUKOCYTE ESTERASE, URINE: NEGATIVE
LYMPHOCYTES ABSOLUTE: 0.89 E9/L (ref 1.5–4)
LYMPHOCYTES RELATIVE PERCENT: 14.3 % (ref 20–42)
MCH RBC QN AUTO: 24.8 PG (ref 26–35)
MCHC RBC AUTO-ENTMCNC: 31 % (ref 32–34.5)
MCV RBC AUTO: 79.9 FL (ref 80–99.9)
MONOCYTES ABSOLUTE: 0.52 E9/L (ref 0.1–0.95)
MONOCYTES RELATIVE PERCENT: 8.4 % (ref 2–12)
NEUTROPHILS ABSOLUTE: 4.72 E9/L (ref 1.8–7.3)
NEUTROPHILS RELATIVE PERCENT: 76.1 % (ref 43–80)
NITRITE, URINE: NEGATIVE
NITRITE, URINE: NEGATIVE
PDW BLD-RTO: 18.8 FL (ref 11.5–15)
PH UA: 7 (ref 5–9)
PH UA: 7.5 (ref 5–9)
PLATELET # BLD: 203 E9/L (ref 130–450)
PMV BLD AUTO: 10.7 FL (ref 7–12)
POTASSIUM SERPL-SCNC: 4.1 MMOL/L (ref 3.5–5)
PROTEIN UA: ABNORMAL MG/DL
PROTEIN UA: NEGATIVE MG/DL
RBC # BLD: 3.83 E12/L (ref 3.5–5.5)
RBC UA: ABNORMAL /HPF (ref 0–2)
RBC UA: NORMAL /HPF (ref 0–2)
SODIUM BLD-SCNC: 134 MMOL/L (ref 132–146)
SPECIFIC GRAVITY UA: 1.01 (ref 1–1.03)
SPECIFIC GRAVITY UA: 1.01 (ref 1–1.03)
TOTAL PROTEIN: 7.5 G/DL (ref 6.4–8.3)
UROBILINOGEN, URINE: 0.2 E.U./DL
UROBILINOGEN, URINE: 0.2 E.U./DL
WBC # BLD: 6.2 E9/L (ref 4.5–11.5)
WBC UA: ABNORMAL /HPF (ref 0–5)
WBC UA: NORMAL /HPF (ref 0–5)

## 2020-03-03 PROCEDURE — 81001 URINALYSIS AUTO W/SCOPE: CPT

## 2020-03-03 PROCEDURE — 85025 COMPLETE CBC W/AUTO DIFF WBC: CPT

## 2020-03-03 PROCEDURE — 86901 BLOOD TYPING SEROLOGIC RH(D): CPT

## 2020-03-03 PROCEDURE — 80053 COMPREHEN METABOLIC PANEL: CPT

## 2020-03-03 PROCEDURE — 6370000000 HC RX 637 (ALT 250 FOR IP): Performed by: STUDENT IN AN ORGANIZED HEALTH CARE EDUCATION/TRAINING PROGRAM

## 2020-03-03 PROCEDURE — 84702 CHORIONIC GONADOTROPIN TEST: CPT

## 2020-03-03 PROCEDURE — 76801 OB US < 14 WKS SINGLE FETUS: CPT

## 2020-03-03 PROCEDURE — 99284 EMERGENCY DEPT VISIT MOD MDM: CPT

## 2020-03-03 PROCEDURE — 86900 BLOOD TYPING SEROLOGIC ABO: CPT

## 2020-03-03 RX ORDER — ACETAMINOPHEN 500 MG
1000 TABLET ORAL EVERY 6 HOURS PRN
COMMUNITY
End: 2022-07-07

## 2020-03-03 RX ORDER — ACETAMINOPHEN 500 MG
1000 TABLET ORAL ONCE
Status: COMPLETED | OUTPATIENT
Start: 2020-03-03 | End: 2020-03-03

## 2020-03-03 RX ADMIN — ACETAMINOPHEN 1000 MG: 500 TABLET, FILM COATED ORAL at 08:53

## 2020-03-03 ASSESSMENT — ENCOUNTER SYMPTOMS
SHORTNESS OF BREATH: 0
SINUS PRESSURE: 0
DIARRHEA: 0
EYE REDNESS: 0
COUGH: 0
EYE PAIN: 0
ABDOMINAL DISTENTION: 0
EYE DISCHARGE: 0
VOMITING: 0
WHEEZING: 0
NAUSEA: 0
BACK PAIN: 0
SORE THROAT: 0

## 2020-03-03 ASSESSMENT — PAIN DESCRIPTION - PAIN TYPE: TYPE: ACUTE PAIN

## 2020-03-03 ASSESSMENT — PAIN SCALES - GENERAL
PAINLEVEL_OUTOF10: 4
PAINLEVEL_OUTOF10: 5

## 2020-03-03 ASSESSMENT — PAIN DESCRIPTION - LOCATION: LOCATION: ABDOMEN

## 2020-03-03 NOTE — ED PROVIDER NOTES
Cardiovascular:      Rate and Rhythm: Normal rate and regular rhythm. Pulses: Normal pulses. Heart sounds: Normal heart sounds. No murmur. Pulmonary:      Effort: Pulmonary effort is normal. No respiratory distress. Breath sounds: Normal breath sounds. No wheezing, rhonchi or rales. Abdominal:      General: Abdomen is flat. There is no distension. Palpations: Abdomen is soft. Tenderness: There is no abdominal tenderness. There is no guarding or rebound. Musculoskeletal:      Right lower leg: No edema. Left lower leg: No edema. Skin:     General: Skin is warm. Findings: No erythema or rash. Neurological:      General: No focal deficit present. Mental Status: She is oriented to person, place, and time. Motor: No weakness. Coordination: Coordination normal.          Procedures     MDM  Number of Diagnoses or Management Options  Threatened miscarriage:   Diagnosis management comments: Differential includes threatened miscarriage versus hematuria versus normal vaginal bleeding in the first trimester. Ultrasound is reassuring with normal fetal heart rate and no evidence of an ectopic pregnancy or heterotopic pregnancy. Straight cath urine specimen was negative for hematuria. Patient will have a repeat quantitative beta hCG performed in 48 hours and the results will be sent to her OB. We discussed return instructions at length. She was discharged home in stable condition. ED Course as of Mar 03 1110   Tue Mar 03, 2020   [id number] ATTENDING PROVIDER ATTESTATION:     I have personally performed and/or participated in the history, exam, medical decision making, and procedures and agree with all pertinent clinical information unless otherwise noted. I have also reviewed and agree with the past medical, family and social history unless otherwise noted.     I have discussed this patient in detail with the resident and provided the instruction and education regarding the evidence-based evaluation and treatment of vaginal bleeding. History: Patient currently about 10 weeks pregnant. History of previous miscarriage. She noted a small amount of blood present after urinating today. Questionable source. My findings: Hilario Rodriguez is a 25 y.o. female whom is in no distress. Physical exam reveals she is alert and oriented in no distress. Heart is regular, lungs are clear. Abdomen is soft nontender. Extremities without edema. My plan: Symptomatic and supportive care. Ultrasound. Will need straight cath urine to try to isolate the source of bleeding. Labs. Electronically signed by Jessica Argueta DO on 3/3/20 at 9:37 AM          [TG]      ED Course User Index  [TG] Jessica Argueta DO      ED Course as of Mar 03 1110   Tue Mar 03, 2020   [id number] ATTENDING PROVIDER ATTESTATION:     I have personally performed and/or participated in the history, exam, medical decision making, and procedures and agree with all pertinent clinical information unless otherwise noted. I have also reviewed and agree with the past medical, family and social history unless otherwise noted. I have discussed this patient in detail with the resident and provided the instruction and education regarding the evidence-based evaluation and treatment of vaginal bleeding. History: Patient currently about 10 weeks pregnant. History of previous miscarriage. She noted a small amount of blood present after urinating today. Questionable source. My findings: Hilario Rodriguez is a 25 y.o. female whom is in no distress. Physical exam reveals she is alert and oriented in no distress. Heart is regular, lungs are clear. Abdomen is soft nontender. Extremities without edema. My plan: Symptomatic and supportive care. Ultrasound. Will need straight cath urine to try to isolate the source of bleeding. Labs.     Electronically signed by Jessica Argueta DO on 3/3/20 at 9:37 AM [TG]      ED Course User Index  [TG] Daniel Man, DO       --------------------------------------------- PAST HISTORY ---------------------------------------------  Past Medical History:  has a past medical history of Depression, Endometriosis of pelvis, and Prolonged emergence from general anesthesia. Past Surgical History:  has a past surgical history that includes Tonsillectomy; Myringotomy Tympanostomy Tube Placement; Middle ear surgery;  section; laparoscopy (2017); pr lap,lysis of adhesions (N/A, 2018); and Dilation and curettage of uterus (N/A, 2019). Social History:  reports that she has never smoked. She has never used smokeless tobacco. She reports that she does not drink alcohol or use drugs. Family History: family history is not on file. The patients home medications have been reviewed.     Allergies: Pcn [penicillins]    -------------------------------------------------- RESULTS -------------------------------------------------  Labs:  Results for orders placed or performed during the hospital encounter of 20   CBC auto differential   Result Value Ref Range    WBC 6.2 4.5 - 11.5 E9/L    RBC 3.83 3.50 - 5.50 E12/L    Hemoglobin 9.5 (L) 11.5 - 15.5 g/dL    Hematocrit 30.6 (L) 34.0 - 48.0 %    MCV 79.9 (L) 80.0 - 99.9 fL    MCH 24.8 (L) 26.0 - 35.0 pg    MCHC 31.0 (L) 32.0 - 34.5 %    RDW 18.8 (H) 11.5 - 15.0 fL    Platelets 560 261 - 892 E9/L    MPV 10.7 7.0 - 12.0 fL    Neutrophils % 76.1 43.0 - 80.0 %    Immature Granulocytes % 0.3 0.0 - 5.0 %    Lymphocytes % 14.3 (L) 20.0 - 42.0 %    Monocytes % 8.4 2.0 - 12.0 %    Eosinophils % 0.6 0.0 - 6.0 %    Basophils % 0.3 0.0 - 2.0 %    Neutrophils Absolute 4.72 1.80 - 7.30 E9/L    Immature Granulocytes # 0.02 E9/L    Lymphocytes Absolute 0.89 (L) 1.50 - 4.00 E9/L    Monocytes Absolute 0.52 0.10 - 0.95 E9/L    Eosinophils Absolute 0.04 (L) 0.05 - 0.50 E9/L    Basophils Absolute 0.02 0.00 - 0.20 E9/L Comprehensive Metabolic Panel   Result Value Ref Range    Sodium 134 132 - 146 mmol/L    Potassium 4.1 3.5 - 5.0 mmol/L    Chloride 99 98 - 107 mmol/L    CO2 23 22 - 29 mmol/L    Anion Gap 12 7 - 16 mmol/L    Glucose 92 74 - 99 mg/dL    BUN 8 6 - 20 mg/dL    CREATININE 0.6 0.5 - 1.0 mg/dL    GFR Non-African American >60 >=60 mL/min/1.73    GFR African American >60     Calcium 9.5 8.6 - 10.2 mg/dL    Total Protein 7.5 6.4 - 8.3 g/dL    Alb 4.0 3.5 - 5.2 g/dL    Total Bilirubin 0.2 0.0 - 1.2 mg/dL    Alkaline Phosphatase 58 35 - 104 U/L    ALT 8 0 - 32 U/L    AST 15 0 - 31 U/L   hCG, quantitative, pregnancy   Result Value Ref Range    hCG Quant 30410.0 (H) <10 mIU/mL   Urinalysis with Microscopic   Result Value Ref Range    Color, UA Yellow Straw/Yellow    Clarity, UA CLOUDY (A) Clear    Glucose, Ur Negative Negative mg/dL    Bilirubin Urine Negative Negative    Ketones, Urine Negative Negative mg/dL    Specific Gravity, UA 1.015 1.005 - 1.030    Blood, Urine LARGE (A) Negative    pH, UA 7.0 5.0 - 9.0    Protein, UA TRACE Negative mg/dL    Urobilinogen, Urine 0.2 <2.0 E.U./dL    Nitrite, Urine Negative Negative    Leukocyte Esterase, Urine TRACE (A) Negative    WBC, UA 5-10 0 - 5 /HPF    RBC, UA 5-10 (A) 0 - 2 /HPF    Epithelial Cells, UA MANY /HPF    Bacteria, UA RARE (A) None Seen /HPF   Urinalysis with Microscopic   Result Value Ref Range    Color, UA Straw Straw/Yellow    Clarity, UA Clear Clear    Glucose, Ur Negative Negative mg/dL    Bilirubin Urine Negative Negative    Ketones, Urine Negative Negative mg/dL    Specific Gravity, UA 1.010 1.005 - 1.030    Blood, Urine Negative Negative    pH, UA 7.5 5.0 - 9.0    Protein, UA Negative Negative mg/dL    Urobilinogen, Urine 0.2 <2.0 E.U./dL    Nitrite, Urine Negative Negative    Leukocyte Esterase, Urine Negative Negative   ABORH TUBE   Result Value Ref Range    ABO/Rh A POS        Radiology:  US OB LESS THAN 14 WEEKS SINGLE OR FIRST GESTATION   Final Result   1.

## 2020-03-07 ENCOUNTER — HOSPITAL ENCOUNTER (EMERGENCY)
Age: 25
Discharge: HOME | End: 2020-03-08
Attending: EMERGENCY MEDICINE
Payer: COMMERCIAL

## 2020-03-07 ENCOUNTER — APPOINTMENT (OUTPATIENT)
Dept: ULTRASOUND IMAGING | Age: 25
End: 2020-03-07
Payer: COMMERCIAL

## 2020-03-07 VITALS
HEART RATE: 85 BPM | HEIGHT: 67 IN | BODY MASS INDEX: 28 KG/M2 | DIASTOLIC BLOOD PRESSURE: 68 MMHG | OXYGEN SATURATION: 99 % | WEIGHT: 178.4 LBS | RESPIRATION RATE: 16 BRPM | SYSTOLIC BLOOD PRESSURE: 114 MMHG | TEMPERATURE: 98.2 F

## 2020-03-07 LAB
BACTERIA: ABNORMAL /HPF
BILIRUBIN URINE: NEGATIVE
BLOOD, URINE: ABNORMAL
CLARITY: CLEAR
COLOR: YELLOW
CRYSTALS, UA: ABNORMAL /HPF
EPITHELIAL CELLS, UA: ABNORMAL /HPF
GLUCOSE URINE: NEGATIVE MG/DL
KETONES, URINE: >=80 MG/DL
LEUKOCYTE ESTERASE, URINE: ABNORMAL
MUCUS: PRESENT /LPF
NITRITE, URINE: NEGATIVE
PH UA: 6 (ref 5–9)
PROTEIN UA: NEGATIVE MG/DL
RBC UA: ABNORMAL /HPF (ref 0–2)
SPECIFIC GRAVITY UA: >=1.03 (ref 1–1.03)
UROBILINOGEN, URINE: 1 E.U./DL
WBC UA: ABNORMAL /HPF (ref 0–5)

## 2020-03-07 PROCEDURE — 76817 TRANSVAGINAL US OBSTETRIC: CPT

## 2020-03-07 PROCEDURE — 6370000000 HC RX 637 (ALT 250 FOR IP): Performed by: EMERGENCY MEDICINE

## 2020-03-07 PROCEDURE — 81001 URINALYSIS AUTO W/SCOPE: CPT

## 2020-03-07 PROCEDURE — 99284 EMERGENCY DEPT VISIT MOD MDM: CPT

## 2020-03-07 RX ORDER — ACETAMINOPHEN 500 MG
1000 TABLET ORAL ONCE
Status: COMPLETED | OUTPATIENT
Start: 2020-03-07 | End: 2020-03-07

## 2020-03-07 RX ADMIN — ACETAMINOPHEN 1000 MG: 500 TABLET, FILM COATED ORAL at 23:27

## 2020-03-07 ASSESSMENT — PAIN DESCRIPTION - LOCATION: LOCATION: ABDOMEN;PELVIS

## 2020-03-07 ASSESSMENT — ENCOUNTER SYMPTOMS
BACK PAIN: 0
ABDOMINAL PAIN: 1
DIARRHEA: 0
VOMITING: 1
BLOOD IN STOOL: 0
NAUSEA: 1

## 2020-03-07 ASSESSMENT — PAIN DESCRIPTION - FREQUENCY: FREQUENCY: INTERMITTENT

## 2020-03-07 ASSESSMENT — PAIN SCALES - GENERAL
PAINLEVEL_OUTOF10: 6
PAINLEVEL_OUTOF10: 6

## 2020-03-07 ASSESSMENT — PAIN DESCRIPTION - DESCRIPTORS: DESCRIPTORS: SHARP;ACHING

## 2020-03-08 PROCEDURE — 6370000000 HC RX 637 (ALT 250 FOR IP): Performed by: EMERGENCY MEDICINE

## 2020-03-08 RX ORDER — NITROFURANTOIN 25; 75 MG/1; MG/1
100 CAPSULE ORAL ONCE
Status: COMPLETED | OUTPATIENT
Start: 2020-03-08 | End: 2020-03-08

## 2020-03-08 RX ADMIN — NITROFURANTOIN (MONOHYDRATE/MACROCRYSTALS) 100 MG: 75; 25 CAPSULE ORAL at 01:16

## 2020-03-08 NOTE — ED PROVIDER NOTES
Patient presents to the ED with the complaint of lower abdominal pain and vaginal bleeding. She is approximately 11 weeks pregnant. She was seen here on the third of this month for the same complaint. At that time she had an ultrasound showing a normal IUP. Since then she has also seen her OB who did a another transvaginal ultrasound showing what she describes a subchorionic hemorrhage. She asked her OB if she was concerned and she told her no. She denies any recent fever or chills. She does complain of some mild discomfort with urinating. She has been experiencing nausea and occasional emesis throughout this pregnancy. She is not requesting anything for nausea at this time. She has not taken anything for her abdominal pain recently. She describes the pain as a cramping and sharp sensation that has been going on intermittently for the past couple hours. She rates his pain a 6 out of 10. She states that shortly after experiencing the abdominal pain she went to the restroom and noticed that there was a small amount of blood on her underwear. She denies passing any large clots or tissue. She states that slight spotting. No pain rating into her back that is new. She reports chronic back pain that is unchanged. Review of Systems   Constitutional: Negative for chills, fatigue and fever. Gastrointestinal: Positive for abdominal pain, nausea and vomiting. Negative for blood in stool and diarrhea. Genitourinary: Positive for dysuria and vaginal bleeding. Negative for difficulty urinating, frequency, urgency and vaginal discharge. Musculoskeletal: Negative for back pain. Skin: Negative for pallor. Neurological: Negative for dizziness. All other systems reviewed and are negative. Physical Exam  Vitals signs and nursing note reviewed. Constitutional:       General: She is not in acute distress. Appearance: She is well-developed. She is obese.  She is not ill-appearing, Depression, Endometriosis of pelvis, and Prolonged emergence from general anesthesia. Past Surgical History:  has a past surgical history that includes Tonsillectomy; Myringotomy Tympanostomy Tube Placement; Middle ear surgery;  section; laparoscopy (2017); pr lap,lysis of adhesions (N/A, 2018); and Dilation and curettage of uterus (N/A, 2019). Social History:  reports that she has never smoked. She has never used smokeless tobacco. She reports that she does not drink alcohol or use drugs. Family History: family history is not on file. The patients home medications have been reviewed. Allergies: Pcn [penicillins]    -------------------------------------------------- RESULTS -------------------------------------------------  Labs:  Results for orders placed or performed during the hospital encounter of 20   Urinalysis, reflex to microscopic   Result Value Ref Range    Color, UA Yellow Straw/Yellow    Clarity, UA Clear Clear    Glucose, Ur Negative Negative mg/dL    Bilirubin Urine Negative Negative    Ketones, Urine >=80 (A) Negative mg/dL    Specific Gravity, UA >=1.030 1.005 - 1.030    Blood, Urine LARGE (A) Negative    pH, UA 6.0 5.0 - 9.0    Protein, UA Negative Negative mg/dL    Urobilinogen, Urine 1.0 <2.0 E.U./dL    Nitrite, Urine Negative Negative    Leukocyte Esterase, Urine TRACE (A) Negative   Microscopic Urinalysis   Result Value Ref Range    Mucus, UA Present (A) None Seen /LPF    WBC, UA 5-10 0 - 5 /HPF    RBC, UA NONE 0 - 2 /HPF    Epithelial Cells, UA RARE /HPF    Bacteria, UA MANY (A) None Seen /HPF    Crystals, UA Rare (A) None Seen /HPF       Radiology:  US OB TRANSVAGINAL    (Results Pending)     Transvaginal ultrasound (ONRAD read):  There is a single live intrauterine pregnancy with calculated gestational age of 16 weeks and 2 days and ZIGGY of 2020.    ------------------------- NURSING NOTES AND VITALS REVIEWED ---------------------------  Date / Time Roomed:  3/7/2020 11:02 PM  ED Bed Assignment:  10/10    The nursing notes within the ED encounter and vital signs as below have been reviewed. /68   Pulse 85   Temp 98.2 °F (36.8 °C) (Oral)   Resp 16   Ht 5' 7\" (1.702 m)   Wt 178 lb 6.4 oz (80.9 kg)   LMP 12/01/2019 (Approximate)   SpO2 99%   BMI 27.94 kg/m²   Oxygen Saturation Interpretation: Normal      ------------------------------------------ PROGRESS NOTES ------------------------------------------  I have spoken with the patient and discussed todays results, in addition to providing specific details for the plan of care and counseling regarding the diagnosis and prognosis. Their questions are answered at this time and they are agreeable with the plan. I discussed at length with them reasons for immediate return here for re evaluation. They will followup with primary care by calling their office tomorrow. --------------------------------- ADDITIONAL PROVIDER NOTES ---------------------------------  At this time the patient is without objective evidence of an acute process requiring hospitalization or inpatient management. They have remained hemodynamically stable throughout their entire ED visit and are stable for discharge with outpatient follow-up. The plan has been discussed in detail and they are aware of the specific conditions for emergent return, as well as the importance of follow-up. New Prescriptions    No medications on file       Diagnosis:  1. Vaginal bleeding in pregnancy        Disposition:  Patient's disposition: Discharge to home  Patient's condition is stable.             Lexus Parks DO  03/08/20 [id number]

## 2020-03-08 NOTE — ED NOTES
Patient transported to [address] [place] [address],[address] at this time.      Hussein Tijerina RN  03/07/20 [id number]

## 2020-06-07 ENCOUNTER — HOSPITAL ENCOUNTER (OUTPATIENT)
Age: 25
Setting detail: OBSERVATION
Discharge: HOME | End: 2020-06-07
Attending: LEGAL MEDICINE | Admitting: LEGAL MEDICINE
Payer: COMMERCIAL

## 2020-06-07 VITALS
HEART RATE: 81 BPM | RESPIRATION RATE: 18 BRPM | DIASTOLIC BLOOD PRESSURE: 57 MMHG | SYSTOLIC BLOOD PRESSURE: 100 MMHG | TEMPERATURE: 98.1 F

## 2020-06-07 PROBLEM — Z3A.24 24 WEEKS GESTATION OF PREGNANCY: Status: ACTIVE | Noted: 2020-06-07

## 2020-06-07 LAB
AMPHETAMINE SCREEN, URINE: NOT DETECTED
BACTERIA: NORMAL /HPF
BARBITURATE SCREEN URINE: NOT DETECTED
BENZODIAZEPINE SCREEN, URINE: NOT DETECTED
BILIRUBIN URINE: NEGATIVE
BLOOD, URINE: NEGATIVE
CANNABINOID SCREEN URINE: NOT DETECTED
CLARITY: NORMAL
CLUE CELLS: ABNORMAL
COCAINE METABOLITE SCREEN URINE: NOT DETECTED
COLOR: YELLOW
EPITHELIAL CELLS, UA: NORMAL /HPF
FENTANYL SCREEN, URINE: NOT DETECTED
GLUCOSE URINE: NEGATIVE MG/DL
KETONES, URINE: NEGATIVE MG/DL
LEUKOCYTE ESTERASE, URINE: NEGATIVE
Lab: NORMAL
METHADONE SCREEN, URINE: NOT DETECTED
NITRITE, URINE: NEGATIVE
OPIATE SCREEN URINE: NOT DETECTED
OXYCODONE URINE: NOT DETECTED
PH UA: 7 (ref 5–9)
PHENCYCLIDINE SCREEN URINE: NOT DETECTED
PROTEIN UA: NEGATIVE MG/DL
RBC UA: NORMAL /HPF (ref 0–2)
SOURCE WET PREP: ABNORMAL
SPECIFIC GRAVITY UA: 1.02 (ref 1–1.03)
TRICHOMONAS PREP: ABNORMAL
UROBILINOGEN, URINE: 1 E.U./DL
WBC UA: NORMAL /HPF (ref 0–5)
YEAST WET PREP: ABNORMAL

## 2020-06-07 PROCEDURE — G0379 DIRECT REFER HOSPITAL OBSERV: HCPCS

## 2020-06-07 PROCEDURE — G0378 HOSPITAL OBSERVATION PER HR: HCPCS

## 2020-06-07 PROCEDURE — 87210 SMEAR WET MOUNT SALINE/INK: CPT

## 2020-06-07 PROCEDURE — 81001 URINALYSIS AUTO W/SCOPE: CPT

## 2020-06-07 PROCEDURE — 80307 DRUG TEST PRSMV CHEM ANLYZR: CPT

## 2020-06-07 PROCEDURE — 6370000000 HC RX 637 (ALT 250 FOR IP): Performed by: LEGAL MEDICINE

## 2020-06-07 RX ORDER — OXYCODONE HYDROCHLORIDE 5 MG/1
5 TABLET ORAL EVERY 6 HOURS PRN
Status: DISCONTINUED | OUTPATIENT
Start: 2020-06-07 | End: 2020-06-07 | Stop reason: HOSPADM

## 2020-06-07 RX ADMIN — OXYCODONE 5 MG: 5 TABLET ORAL at 02:33

## 2020-06-07 RX ADMIN — OXYCODONE 5 MG: 5 TABLET ORAL at 08:41

## 2020-06-07 ASSESSMENT — PAIN SCALES - GENERAL
PAINLEVEL_OUTOF10: 5
PAINLEVEL_OUTOF10: 8

## 2020-06-07 NOTE — PROGRESS NOTES
25year old  24.1 weeks of Dr. Anna Fuchs presents to labor and delivery with complaints of \"one continuous contraction since 1400\" Patient also complains of back pain that feel like \"back labor\". Rates pain 8/10. Patient states she took 2 tylenol that were 325 mg around 2300. Denies leaking of fluid or vaginal bleeding. Patient states positive fetal movement. Takes a prenatal daily and tylenol as needed. States she was on progesterone tablets for the first 12 weeks of her pregnancy. Previous c/s for \"failure to progress\" patient reported. Patient states that she had one miscarriage at 12 weeks. No medical history reported. Adequate prenatal care before 12 weeks gestation.   Temperature 98.1  Pulse 93  Respirations 16  Blood Pressure 119/62  Fetal Heart Rate 140s with accelerations

## 2020-06-07 NOTE — PLAN OF CARE
Problem: Healthcare acquired conditions:  Goal: Absence of healthcare acquired conditions  Description: Absence of healthcare acquired conditions  Outcome: Ongoing

## 2020-06-07 NOTE — PROGRESS NOTES
Dr Ed Santiago notified of pts arrival and chief complaint. Orders for UA, UDS, wet prep. Orders for heating pad and oxycodone 5mg q6 PRN for the back pain. Order to check patient cervix to confirm it is closed. Call with results of urine if positive for nitrates.

## 2020-06-07 NOTE — PROGRESS NOTES
"SUBJECTIVE/OBJECTIVE:                Nathaly Farias is a 62 year old female called for a follow-up visit for Medication Therapy Management.  She was referred to me from Dr. Porter. Today's visit was conducted with patient's , Wolf (consent to communicate on file).     Chief Complaint: Follow up from [facility] visit on 8/21/19  Tobacco:  reports that she has never smoked. She has never used smokeless tobacco.  Alcohol: none    Medication Adherence/Access: no issues reported    Parkinson's Disease/dementia/psychosis:  Current medications include: Carbidopa-levodopa  mg 2 tablets 3 times daily at 7 am, 12 pm, and 8:30 pm, quetiapine 75 mg nightly, citalopram 30 mg daily, and rivastigmine patch 13.3 mg daily.  states that she has been in the emergency department twice in the last month due to increased shaking and anxiety. He reports that when she gets these episodes of shaking, her anxiety is very high and she can't breathe. He is concerned that this could be worsening of her PD, dementia, or depression/anxiety. He also endorses that she is seeing people in their home again and her memory seems worse.  states the last time they went to the ED it was around 4-5 pm, but typically the shaking is worse around noon. She got IV Ativan in the hospital, which reportedly improved the anxiety and shaking but  states it \"wiped her out\" and she couldn't walk or doing anything after. She has taken one dose of oral alprazolam since the last ED visit and  stated it didn't seem as strong, but did help her. Overall,  would say that the physical symptoms of PD are not as bothersome to her as the dementia/hallucinations. She has been taking CBD oil every night and  states this has been helpful for memory and shaking. She went off the CBD for a while and reportedly \"everything\" got worse, per . Niece is taking care of the patient 4-5 hours per day and cooking her healthy " Patient refusing continuous monitoring, requesting to sleep at this time. "meals.    Depression/anxiety: Currently taking citalopram 30 mg once daily. Was recently given a prescription for alprazolam at ED but we instructed her not to take it because of concern for worsening her cognition.  states her depression seems to be worse too and it seems like she doesn't \"know what to do\" because of the dementia.     Urinary frequency/bladder prolapse: Not taking any medications. She tried trospium for a while with no benefit so they stopped it.  is hoping she will have bladder surgery for bladder prolapse because she is going to the bathroom more frequently, sometimes 3 times per hour. He is also hopeful that her sleep would improve if she wasn't getting up so frequently overnight to go to the bathroom.     Episodic Migraine without Aura: Current preventive medications include: none. Current abortive medications include: Aleve and Zolmitriptan.  reports that she has mild headaches about 3-4 days/week. She takes Aleve with some headaches but not all (he couldn't quantify). She only takes zolmitriptan if having a migraine, about once every 2 months. She does not take Tylenol at all.  is concerned the headaches get worse when she is shaking.     ASSESSMENT:                Medication Adherence: excellent, no issues identified    Parkinson's Disease/dementia/psychosis:  Needs improvement. The patient seems to be having some acute worsening of PD with increased tremor/shaking, increased hallucinations, increased confusion, increased anxiety/depression, and frequent urination. These symptoms may be indicative of a UTI and Dr. Porter agreed it may be beneficial to check a UA. In the meantime, we also discussed a small increase in quetiapine dose for the hallucinations/anxiety. I am hesitant to increase levodopa given the possibility of worsening the hallucinations.     Depression/anxiety: Needs improvement. Could consider changing citalopram to an alternate antidepressant such " as sertraline or escitalopram. QTc was WNL at 393 on 11/17/19. Or, the serotonin specific reuptake inhibitor could be augmented with buspirone. Quetiapine may be a safer medication to use for anxiety PRN than the benzodiazepine in the long-term but will hold off on adding PRN quetiapine until after the UA is done.    Urinary frequency/bladder prolapse: Needs improvement. Patient will be seeing urology in a couple weeks. I would advise avoiding anticholinergic medications at this time given the risk of worsening her cognitive impairment/dementia. In addition, it is possible that the patient may have a UTI currently.     Episodic Migraine without Aura: Needs improvement. Unable to quantify the use of NSAIDs but seems to be within 14 days/month. Using NSAIDs more often than this may cause medication overuse headaches. Patient will be seeing Dr. Barrera in neurology in December to discuss headaches at greater length. It is possible that she could benefit from a preventive treatment but given the complexity of her PD/dementia, tricyclic antidepressants and antiepileptic medications should probably be avoided.     BP Readings from Last 3 Encounters:   11/17/19 (!) 160/97   10/25/19 (!) 153/100   10/02/19 139/89     PLAN:                  1. [facility] pharmacist reached out to Dr. Monae regarding possible UTI and to check a UA.   2. Start quetiapine 12.5 mg in the morning. Continue 75 mg of quetiapine at night.   3. Patient to see Dr. Barrera for headaches in December.   Addendum 11/21/19: Dr. Monae's office contacted the  and he is unable to take her in for a UA. We can try increasing quetiapine by 12.5 mg in the morning and continue 75 mg at night for now. Dr. Porter agreed with this change.     I spent 30 minutes with this patient today. All changes were made via verbal approval with Dr. Porter. A copy of the visit note was provided to the patient's primary care and referring provider.     Will follow up with Dr. Porter  on 12/16/19 then with [facility] pharmacist in 3 months    The patient declined a summary of these recommendations as an after visit summary.    Corazon Dhaliwal Pharm.D.  Medication Therapy Management Pharmacist  Phone: [phone]

## 2020-06-07 NOTE — H&P
is closed and long. No contractions  and fetal heart tones in the 140s. EXTREMITIES:  No clubbing, cyanosis, or edema. NEURO:  CN II through XII are grossly intact. She is alert and oriented  x4. LABORATORY DATA:  UA is negative. Urine drug screen is negative. Wet  prep demonstrated 1+ clue cells. ASSESSMENT:  Intrauterine pregnancy at 24 weeks and 1 day with upper  back pain. The pain has subsided since administration of oxycodone. The patient feels stable to go home. PLAN:  Home with fetal movement and  labor precautions. Prescription for Percocet 5 mg , #20 has been given. Risks of   abstinence syndrome with the drug has been reviewed with her. She is to continue ambulating and use heating pad and ice to alleviate  the pain. She is to keep her followup appointment at the office. She  indicates understanding of all instructions.         Elba Glover MD    D: 2020 8:52:52       T: 2020 8:55:05     [id number]  Job#: [id number]     Doc#: [id number]    CC:

## 2020-06-07 NOTE — PROGRESS NOTES
Assuming care of patient. Patient in bed, states she is having discomfort, but relays that it is not as severe as it was on admission. Will administer pain medication per order when due. Plan of care discussed with patient; patient verbalizes understanding. Call light in reach.

## 2020-06-08 ENCOUNTER — APPOINTMENT (OUTPATIENT)
Dept: GENERAL RADIOLOGY | Age: 25
End: 2020-06-08
Payer: COMMERCIAL

## 2020-06-08 ENCOUNTER — APPOINTMENT (OUTPATIENT)
Dept: ULTRASOUND IMAGING | Age: 25
End: 2020-06-08
Payer: COMMERCIAL

## 2020-06-08 ENCOUNTER — HOSPITAL ENCOUNTER (OUTPATIENT)
Age: 25
Setting detail: OBSERVATION
Discharge: HOME | End: 2020-06-08
Attending: LEGAL MEDICINE | Admitting: LEGAL MEDICINE
Payer: COMMERCIAL

## 2020-06-08 VITALS
RESPIRATION RATE: 18 BRPM | HEART RATE: 92 BPM | DIASTOLIC BLOOD PRESSURE: 57 MMHG | SYSTOLIC BLOOD PRESSURE: 119 MMHG | TEMPERATURE: 98.2 F

## 2020-06-08 LAB
AMPHETAMINE SCREEN, URINE: NOT DETECTED
BARBITURATE SCREEN URINE: NOT DETECTED
BENZODIAZEPINE SCREEN, URINE: NOT DETECTED
BUPRENORPHINE URINE: NOT DETECTED
CANNABINOID SCREEN URINE: NOT DETECTED
COCAINE METABOLITE SCREEN URINE: NOT DETECTED
Lab: ABNORMAL
METHADONE SCREEN, URINE: NOT DETECTED
OPIATE SCREEN URINE: NOT DETECTED
OXYCODONE URINE: POSITIVE
PHENCYCLIDINE SCREEN URINE: NOT DETECTED

## 2020-06-08 PROCEDURE — G0379 DIRECT REFER HOSPITAL OBSERV: HCPCS

## 2020-06-08 PROCEDURE — 96372 THER/PROPH/DIAG INJ SC/IM: CPT

## 2020-06-08 PROCEDURE — 6370000000 HC RX 637 (ALT 250 FOR IP): Performed by: LEGAL MEDICINE

## 2020-06-08 PROCEDURE — 76705 ECHO EXAM OF ABDOMEN: CPT

## 2020-06-08 PROCEDURE — G0378 HOSPITAL OBSERVATION PER HR: HCPCS

## 2020-06-08 PROCEDURE — 6360000002 HC RX W HCPCS: Performed by: LEGAL MEDICINE

## 2020-06-08 PROCEDURE — 72070 X-RAY EXAM THORAC SPINE 2VWS: CPT

## 2020-06-08 PROCEDURE — 80307 DRUG TEST PRSMV CHEM ANLYZR: CPT

## 2020-06-08 PROCEDURE — G0480 DRUG TEST DEF 1-7 CLASSES: HCPCS

## 2020-06-08 RX ORDER — PROMETHAZINE HYDROCHLORIDE 25 MG/ML
25 INJECTION, SOLUTION INTRAMUSCULAR; INTRAVENOUS ONCE
Status: COMPLETED | OUTPATIENT
Start: 2020-06-08 | End: 2020-06-08

## 2020-06-08 RX ORDER — ACETAMINOPHEN 500 MG
1000 TABLET ORAL EVERY 6 HOURS
Status: DISCONTINUED | OUTPATIENT
Start: 2020-06-08 | End: 2020-06-08 | Stop reason: HOSPADM

## 2020-06-08 RX ORDER — MEPERIDINE HYDROCHLORIDE 50 MG/ML
50 INJECTION INTRAMUSCULAR; INTRAVENOUS; SUBCUTANEOUS ONCE
Status: COMPLETED | OUTPATIENT
Start: 2020-06-08 | End: 2020-06-08

## 2020-06-08 RX ADMIN — PROMETHAZINE HYDROCHLORIDE 25 MG: 25 INJECTION INTRAMUSCULAR; INTRAVENOUS at 05:32

## 2020-06-08 RX ADMIN — ACETAMINOPHEN 1000 MG: 500 TABLET, FILM COATED ORAL at 09:08

## 2020-06-08 RX ADMIN — MEPERIDINE HYDROCHLORIDE 50 MG: 50 INJECTION, SOLUTION INTRAMUSCULAR; INTRAVENOUS; SUBCUTANEOUS at 05:32

## 2020-06-08 ASSESSMENT — PAIN SCALES - GENERAL
PAINLEVEL_OUTOF10: 6
PAINLEVEL_OUTOF10: 8

## 2020-06-08 ASSESSMENT — PAIN DESCRIPTION - DESCRIPTORS: DESCRIPTORS: CONSTANT;DISCOMFORT

## 2020-06-08 NOTE — H&P
thoracic spines. ABDOMEN:  Gravid, soft, and nontender. No rebound or guarding. Normal  bowel sounds are present. Fetal heart tones are present in the 150s. No contractions. EXTREMITIES:  No clubbing, cyanosis, or edema. NEUROLOGIC:  CN II through XII are grossly intact. She is alert and  oriented x4. LABORATORY DATA:  Urine drug screen is positive for oxycodone. X-RAY:  X-ray of the back demonstrates minimal thoracic spine  degenerative disease inferiorly. No evidence of acute findings in the  thoracic spine. Ultrasound of the gallbladder reveals unremarkable  right upper quadrant ultrasound. Gallbladder is unremarkable as is the  right kidney and visualized portions of the pancreas. ASSESSMENT:  The patient with at 24-1/2 weeks with upper back pain most  likely musculoskeletal in origin. She received Demerol and Phenergan  with relief of her pain. PLAN:  We will continue to monitor. Further management based on  clinical response.           Andree Hines MD    D: 06/08/2020 8:41:32       T: 06/08/2020 8:45:38     [id number]  Job#: [id number]     Doc#: [id number]    CC:

## 2020-06-12 LAB
6AM URINE: <10 NG/ML
CODEINE, URINE: <20 NG/ML
HYDROCODONE, URINE: <20 NG/ML
HYDROMORPHONE, URINE: <20 NG/ML
MORPHINE URINE: <20 NG/ML
NORHYDROCODONE, URINE: <20 NG/ML
NOROXYCODONE, URINE: 889 NG/ML
NOROXYMORPHONE, URINE: 46 NG/ML
OXYCODONE, URINE CONFIRMATION: 66 NG/ML
OXYMORPHONE, URINE: <20 NG/ML

## 2020-07-01 ENCOUNTER — HOSPITAL ENCOUNTER (OUTPATIENT)
Age: 25
Setting detail: OBSERVATION
Discharge: HOME | End: 2020-07-01
Attending: LEGAL MEDICINE | Admitting: LEGAL MEDICINE
Payer: COMMERCIAL

## 2020-07-01 VITALS
HEART RATE: 90 BPM | TEMPERATURE: 98.2 F | DIASTOLIC BLOOD PRESSURE: 70 MMHG | RESPIRATION RATE: 16 BRPM | SYSTOLIC BLOOD PRESSURE: 110 MMHG

## 2020-07-01 PROBLEM — Z3A.27 27 WEEKS GESTATION OF PREGNANCY: Status: ACTIVE | Noted: 2020-07-01

## 2020-07-01 LAB
AMPHETAMINE SCREEN, URINE: NOT DETECTED
BACTERIA: ABNORMAL /HPF
BARBITURATE SCREEN URINE: NOT DETECTED
BENZODIAZEPINE SCREEN, URINE: NOT DETECTED
BILIRUBIN URINE: NEGATIVE
BLOOD, URINE: NEGATIVE
CANNABINOID SCREEN URINE: NOT DETECTED
CLARITY: ABNORMAL
COCAINE METABOLITE SCREEN URINE: NOT DETECTED
COLOR: YELLOW
EPITHELIAL CELLS, UA: ABNORMAL /HPF
FENTANYL SCREEN, URINE: NOT DETECTED
FETAL FIBRONECTIN: NEGATIVE
GLUCOSE URINE: NEGATIVE MG/DL
KETONES, URINE: NEGATIVE MG/DL
LEUKOCYTE ESTERASE, URINE: ABNORMAL
Lab: NORMAL
METHADONE SCREEN, URINE: NOT DETECTED
NITRITE, URINE: NEGATIVE
OPIATE SCREEN URINE: NOT DETECTED
OXYCODONE URINE: NOT DETECTED
PH UA: 6.5 (ref 5–9)
PHENCYCLIDINE SCREEN URINE: NOT DETECTED
PROTEIN UA: NEGATIVE MG/DL
RBC UA: ABNORMAL /HPF (ref 0–2)
SPECIFIC GRAVITY UA: 1.02 (ref 1–1.03)
UROBILINOGEN, URINE: 1 E.U./DL
WBC UA: ABNORMAL /HPF (ref 0–5)

## 2020-07-01 PROCEDURE — 87591 N.GONORRHOEAE DNA AMP PROB: CPT

## 2020-07-01 PROCEDURE — G0379 DIRECT REFER HOSPITAL OBSERV: HCPCS

## 2020-07-01 PROCEDURE — G0378 HOSPITAL OBSERVATION PER HR: HCPCS

## 2020-07-01 PROCEDURE — 81001 URINALYSIS AUTO W/SCOPE: CPT

## 2020-07-01 PROCEDURE — 87491 CHLMYD TRACH DNA AMP PROBE: CPT

## 2020-07-01 PROCEDURE — 82731 ASSAY OF FETAL FIBRONECTIN: CPT

## 2020-07-01 PROCEDURE — 80307 DRUG TEST PRSMV CHEM ANLYZR: CPT
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
## 2020-07-01 NOTE — PROGRESS NOTES
Informed Dr Ashley Ortiz of patient's arrival to the unit. She ordered UA, UDS, urine gonorrhea and chlamydia, FFN and no vaginal exam unless patient is sera.

## 2020-07-01 NOTE — H&P
[address]                              HISTORY AND PHYSICAL    PATIENT NAME: Velma Galvan                :        1995  MED REC NO:   [id number]                            ROOM:       LTR04  ACCOUNT NO:   [de-identified]                           ADMIT DATE: 2020. PROVIDER:     Onel Walters MD    HISTORY OF PRESENT ILLNESS:   80-year-old white female presented with  complaints of back pain and cramping since the morning. The pain  started in her back and came to the front. She has a history of chronic  back pain during the pregnancy. No leaking fluid or vaginal bleeding. No change in fetal movements. No change in her bowel or urinary habits. Last intercourse was 2-1/2 weeks ago. She stated that she had gotten  into an argument with her boyfriend, at which point the pain had  started. She had not taken anything for the pain. She rated the pain  as a level six. Pain was not alleviated with movement. However, the  pain did subside with rest.      For her past medical, surgical, GYN,  social, family history, please refer to ACOG forms A and B. Review of  system is negative for cardiac, respiratory, GI, , neurologic,  psychiatric, ENT, integument, hematologic, lymphatic, constitutional  abnormalities. PHYSICAL EXAMINATION:  VITAL SIGNS:  Stable. HEENT:  Negative. LUNGS:  Clear to auscultation. CV:  Regular rate and rhythm. ABDOMEN:  Gravid, soft, nontender. No rebound or guarding. Normal  bowel sounds are present. Fetal heart tones are present in the 140s  with wfzo-pf-cnke variability present. Accelerations noted. No  contractions. Cervix is closed and long. EXTREMITIES:  No clubbing, cyanosis, edema. NEURO:  CN II through XII are grossly intact. She is alert and oriented  x4. LABORATORY DATA:  UA is contaminated. Fetal fibronectin is negative.     ASSESSMENT: Intrauterine pregnancy at 27-1/2 weeks with no evidence of  maternal or fetal compromise. No evidence of  labor. Likely  back strain. The patient is declining any medical treatment. She feels  ready to go home. Stable. PLAN:  Home with fetal movement and  labor precautions. She is  to keep her followup appointment at the office. She indicated  understanding of all instructions.         Etta Pop MD    D: 2020 15:17:59       T: 2020 15:25:32     [email or web address]  Job#: [id number]     Doc#: [id number]    CC:

## 2020-07-06 LAB
C. TRACHOMATIS DNA ,URINE: NEGATIVE
N. GONORRHOEAE DNA, URINE: NEGATIVE
SOURCE: NORMAL

## 2020-08-11 ENCOUNTER — HOSPITAL ENCOUNTER (EMERGENCY)
Age: 25
Discharge: HOME | End: 2020-08-11
Payer: COMMERCIAL

## 2020-08-11 VITALS
HEIGHT: 67 IN | DIASTOLIC BLOOD PRESSURE: 71 MMHG | OXYGEN SATURATION: 98 % | HEART RATE: 95 BPM | WEIGHT: 205 LBS | SYSTOLIC BLOOD PRESSURE: 107 MMHG | TEMPERATURE: 99.3 F | RESPIRATION RATE: 18 BRPM | BODY MASS INDEX: 32.18 KG/M2

## 2020-08-11 PROCEDURE — 99212 OFFICE O/P EST SF 10 MIN: CPT

## 2020-08-11 RX ORDER — CLINDAMYCIN HYDROCHLORIDE 300 MG/1
300 CAPSULE ORAL 3 TIMES DAILY
Qty: 30 CAPSULE | Refills: 0 | Status: SHIPPED | OUTPATIENT
Start: 2020-08-11 | End: 2020-08-21

## 2020-08-11 ASSESSMENT — PAIN DESCRIPTION - PROGRESSION: CLINICAL_PROGRESSION: GRADUALLY WORSENING

## 2020-08-11 ASSESSMENT — PAIN SCALES - GENERAL: PAINLEVEL_OUTOF10: 9

## 2020-08-11 ASSESSMENT — PAIN DESCRIPTION - ONSET: ONSET: GRADUAL

## 2020-08-11 ASSESSMENT — PAIN DESCRIPTION - ORIENTATION: ORIENTATION: RIGHT;LOWER

## 2020-08-11 ASSESSMENT — PAIN DESCRIPTION - PAIN TYPE: TYPE: ACUTE PAIN

## 2020-08-11 ASSESSMENT — PAIN DESCRIPTION - DESCRIPTORS: DESCRIPTORS: THROBBING

## 2020-08-11 ASSESSMENT — PAIN DESCRIPTION - LOCATION: LOCATION: TEETH

## 2020-08-11 NOTE — ED PROVIDER NOTES
and time. GCS: GCS eye subscore is 4. GCS verbal subscore is 5. GCS motor subscore is 6. Cranial Nerves: No cranial nerve deficit. Sensory: No sensory deficit. Coordination: Coordination normal.      Gait: Gait normal.         Procedures    MDM  Number of Diagnoses or Management Options  Pain, dental:   Diagnosis management comments: Placed on clindamycin since patient is allergic to penicillin. However take Tylenol for pain. Have her follow-up with her dentist.  If worse go to the ER.           --------------------------------------------- PAST HISTORY ---------------------------------------------  Past Medical History:  has a past medical history of Depression, Endometriosis of pelvis, and Prolonged emergence from general anesthesia. Past Surgical History:  has a past surgical history that includes Tonsillectomy; Myringotomy Tympanostomy Tube Placement; Middle ear surgery;  section; laparoscopy (2017); pr lap,lysis of adhesions (N/A, 2018); and Dilation and curettage of uterus (N/A, 2019). Social History:  reports that she has never smoked. She has never used smokeless tobacco. She reports that she does not drink alcohol or use drugs. Family History: family history is not on file. The patients home medications have been reviewed. Allergies: Pcn [penicillins]    -------------------------------------------------- RESULTS -------------------------------------------------  No results found for this visit on 20. No orders to display       ------------------------- NURSING NOTES AND VITALS REVIEWED ---------------------------   The nursing notes within the ED encounter and vital signs as below have been reviewed.    /71   Pulse 95   Temp 99.3 °F (37.4 °C) (Infrared)   Resp 18   Ht 5' 7\" (1.702 m)   Wt 205 lb (93 kg)   LMP 2019 (Approximate)   SpO2 98%   BMI 32.11 kg/m²   Oxygen Saturation Interpretation: Normal      ------------------------------------------ PROGRESS NOTES ------------------------------------------   I have spoken with the patient and discussed todays results, in addition to providing specific details for the plan of care and counseling regarding the diagnosis and prognosis. Their questions are answered at this time and they are agreeable with the plan.      --------------------------------- ADDITIONAL PROVIDER NOTES ---------------------------------     This patient is stable for discharge. I have shared the specific conditions for return, as well as the importance of follow-up. * NOTE: This report was transcribed using voice recognition software. Every effort was made to ensure accuracy; however, inadvertent computerized transcription errors may be present.    --------------------------------- IMPRESSION AND DISPOSITION ---------------------------------    IMPRESSION  1.  Pain, dental        DISPOSITION  Disposition: Discharge to home  Patient condition is good         Baron Dawn PA-C  08/11/20 2523

## 2020-09-05 ENCOUNTER — APPOINTMENT (OUTPATIENT)
Dept: ULTRASOUND IMAGING | Age: 25
End: 2020-09-05
Payer: COMMERCIAL

## 2020-09-05 ENCOUNTER — APPOINTMENT (OUTPATIENT)
Dept: LABOR AND DELIVERY | Age: 25
End: 2020-09-05
Payer: COMMERCIAL

## 2020-09-05 ENCOUNTER — HOSPITAL ENCOUNTER (OUTPATIENT)
Age: 25
Discharge: HOME | End: 2020-09-05
Attending: LEGAL MEDICINE | Admitting: LEGAL MEDICINE
Payer: COMMERCIAL

## 2020-09-05 VITALS
RESPIRATION RATE: 18 BRPM | DIASTOLIC BLOOD PRESSURE: 73 MMHG | SYSTOLIC BLOOD PRESSURE: 113 MMHG | TEMPERATURE: 98.3 F | HEART RATE: 99 BPM

## 2020-09-05 PROCEDURE — 76819 FETAL BIOPHYS PROFIL W/O NST: CPT

## 2020-09-05 NOTE — PROGRESS NOTES
37 weeks gestation here for scheduled NST. Denies vaginal bleeding, SOB, chest pain, leaking fluid or cramping at this time. Orientated to room and procedure. Placed on monitor. Plan of care discussed. Will continue to monitor. Call light with in reach.

## 2020-09-07 ENCOUNTER — APPOINTMENT (OUTPATIENT)
Dept: LABOR AND DELIVERY | Age: 25
End: 2020-09-07
Payer: COMMERCIAL

## 2020-09-07 ENCOUNTER — HOSPITAL ENCOUNTER (OUTPATIENT)
Age: 25
Setting detail: OBSERVATION
Discharge: HOME | End: 2020-09-07
Attending: LEGAL MEDICINE | Admitting: LEGAL MEDICINE
Payer: COMMERCIAL

## 2020-09-07 VITALS
DIASTOLIC BLOOD PRESSURE: 65 MMHG | HEART RATE: 90 BPM | SYSTOLIC BLOOD PRESSURE: 113 MMHG | RESPIRATION RATE: 16 BRPM | TEMPERATURE: 98.4 F

## 2020-09-07 PROBLEM — Z3A.37 37 WEEKS GESTATION OF PREGNANCY: Status: ACTIVE | Noted: 2020-09-07

## 2020-09-07 PROCEDURE — 59025 FETAL NON-STRESS TEST: CPT

## 2020-09-07 PROCEDURE — G0379 DIRECT REFER HOSPITAL OBSERV: HCPCS

## 2020-09-07 PROCEDURE — G0378 HOSPITAL OBSERVATION PER HR: HCPCS

## 2020-09-07 NOTE — PROGRESS NOTES
Dr Odilon Alvarez notified of reactive nst, requesting bpp be done. Order placed. Attempting to contact dept. No answer, message left to call back.  unable to page dept as no pagers carried.  Supervisor paged

## 2020-09-07 NOTE — PROGRESS NOTES
Discharged ambulatory to home with instruction in hand.   Copy of strip and front sheet faxed to Dr Leisa Leal office

## 2020-09-07 NOTE — PROGRESS NOTES
patient of Dr José Manuel Connell with edc of 2020 received into tr 3 for scheduled NST for decreased amniotic fluid and gestational diabetes. Patient denies c/o other them some nausea yesterday and bouts of round ligament pain. Denies leaking of fluid. Test explained. No needs. Vitals obtained. Fetus active at this time.

## 2020-09-07 NOTE — PROGRESS NOTES
Hard stop for discharge order, nursing communication placed. Patient given discharge instructions and instructed on kick counts. Follow up in office on Wednesday as instructed. Patient voices understanding of instructions received.

## 2020-09-08 NOTE — H&P
Biophysical profile. A biophysical profile was performed by me. Ultrasound was performed at  the bedside. Findings were of a farah in vertex presentation with  good fetal cardiac activity and movement noted. Fundal posterior grade  3 placenta was noted. Amniotic fluid indices were 3.89, 2.37, 1.63, and  2.69 respectively. Biophysical profile was 8/8 with +2 for fetal tone,  fetal movement, and fetal breathing movement as well as amniotic fluid  pocket greater than 2 cm. Multiple loops of cord of noted, giving subjective appearance of decreased amniotic fluid. ASSESSMENT:  Normal biophysical profile with subjective decrease in  amniotic fluid. PLAN:  Home with fetal movement and labor precautions. She is to follow  up at the office on Wednesday. She indicates understanding of all  instructions.         Shade Taylor MD    D: 09/07/2020 18:34:51       T: 09/07/2020 18:38:11     [email or web address]  Job#: [id number]     Doc#: [id number]    CC:

## 2020-09-16 ENCOUNTER — ANESTHESIA (OUTPATIENT)
Dept: LABOR AND DELIVERY | Age: 25
DRG: 540 | End: 2020-09-16
Payer: COMMERCIAL

## 2020-09-16 ENCOUNTER — HOSPITAL ENCOUNTER (INPATIENT)
Age: 25
LOS: 2 days | Discharge: HOME | DRG: 540 | End: 2020-09-18
Attending: LEGAL MEDICINE | Admitting: LEGAL MEDICINE
Payer: COMMERCIAL

## 2020-09-16 ENCOUNTER — ANESTHESIA EVENT (OUTPATIENT)
Dept: LABOR AND DELIVERY | Age: 25
DRG: 540 | End: 2020-09-16
Payer: COMMERCIAL

## 2020-09-16 ENCOUNTER — APPOINTMENT (OUTPATIENT)
Dept: ULTRASOUND IMAGING | Age: 25
DRG: 540 | End: 2020-09-16
Payer: COMMERCIAL

## 2020-09-16 VITALS
OXYGEN SATURATION: 98 % | RESPIRATION RATE: 24 BRPM | DIASTOLIC BLOOD PRESSURE: 71 MMHG | SYSTOLIC BLOOD PRESSURE: 121 MMHG

## 2020-09-16 PROBLEM — O26.93 COMPLICATION OF PREGNANCY IN THIRD TRIMESTER: Status: ACTIVE | Noted: 2020-09-16

## 2020-09-16 PROBLEM — Z34.93 NORMAL PREGNANCY IN THIRD TRIMESTER: Status: ACTIVE | Noted: 2020-09-16

## 2020-09-16 LAB
ABO/RH: NORMAL
ALBUMIN SERPL-MCNC: 3.5 G/DL (ref 3.5–5.2)
ALP BLD-CCNC: 99 U/L (ref 35–104)
ALT SERPL-CCNC: 6 U/L (ref 0–32)
AMPHETAMINE SCREEN, URINE: NOT DETECTED
ANION GAP SERPL CALCULATED.3IONS-SCNC: 13 MMOL/L (ref 7–16)
ANTIBODY SCREEN: NORMAL
AST SERPL-CCNC: 23 U/L (ref 0–31)
BACTERIA: ABNORMAL /HPF
BARBITURATE SCREEN URINE: NOT DETECTED
BENZODIAZEPINE SCREEN, URINE: NOT DETECTED
BILIRUB SERPL-MCNC: 0.3 MG/DL (ref 0–1.2)
BILIRUBIN URINE: NEGATIVE
BLOOD, URINE: NEGATIVE
BUN BLDV-MCNC: 5 MG/DL (ref 6–20)
CALCIUM SERPL-MCNC: 9.2 MG/DL (ref 8.6–10.2)
CANNABINOID SCREEN URINE: NOT DETECTED
CHLORIDE BLD-SCNC: 105 MMOL/L (ref 98–107)
CLARITY: CLEAR
CO2: 20 MMOL/L (ref 22–29)
COCAINE METABOLITE SCREEN URINE: NOT DETECTED
COLOR: YELLOW
CREAT SERPL-MCNC: 0.6 MG/DL (ref 0.5–1)
EPITHELIAL CELLS, UA: ABNORMAL /HPF
FENTANYL SCREEN, URINE: NOT DETECTED
GFR AFRICAN AMERICAN: >60
GFR NON-AFRICAN AMERICAN: >60 ML/MIN/1.73
GLUCOSE BLD-MCNC: 78 MG/DL (ref 74–99)
GLUCOSE URINE: NEGATIVE MG/DL
HCT VFR BLD CALC: 31.3 % (ref 34–48)
HEMOGLOBIN: 9.3 G/DL (ref 11.5–15.5)
KETONES, URINE: NEGATIVE MG/DL
LEUKOCYTE ESTERASE, URINE: NEGATIVE
Lab: NORMAL
MCH RBC QN AUTO: 25.4 PG (ref 26–35)
MCHC RBC AUTO-ENTMCNC: 29.7 % (ref 32–34.5)
MCV RBC AUTO: 85.5 FL (ref 80–99.9)
METHADONE SCREEN, URINE: NOT DETECTED
NITRITE, URINE: NEGATIVE
OPIATE SCREEN URINE: NOT DETECTED
OXYCODONE URINE: NOT DETECTED
PDW BLD-RTO: 26.2 FL (ref 11.5–15)
PH UA: 6.5 (ref 5–9)
PHENCYCLIDINE SCREEN URINE: NOT DETECTED
PLATELET # BLD: 188 E9/L (ref 130–450)
PMV BLD AUTO: 10.6 FL (ref 7–12)
POTASSIUM SERPL-SCNC: 4.5 MMOL/L (ref 3.5–5)
PROTEIN UA: NEGATIVE MG/DL
RBC # BLD: 3.66 E12/L (ref 3.5–5.5)
RBC UA: ABNORMAL /HPF (ref 0–2)
SODIUM BLD-SCNC: 138 MMOL/L (ref 132–146)
SPECIFIC GRAVITY UA: <=1.005 (ref 1–1.03)
TOTAL PROTEIN: 6.4 G/DL (ref 6.4–8.3)
UROBILINOGEN, URINE: 0.2 E.U./DL
WBC # BLD: 4.9 E9/L (ref 4.5–11.5)
WBC UA: ABNORMAL /HPF (ref 0–5)

## 2020-09-16 PROCEDURE — 2580000003 HC RX 258: Performed by: LEGAL MEDICINE

## 2020-09-16 PROCEDURE — 86850 RBC ANTIBODY SCREEN: CPT

## 2020-09-16 PROCEDURE — 81001 URINALYSIS AUTO W/SCOPE: CPT

## 2020-09-16 PROCEDURE — 7100000000 HC PACU RECOVERY - FIRST 15 MIN: Performed by: LEGAL MEDICINE

## 2020-09-16 PROCEDURE — 76819 FETAL BIOPHYS PROFIL W/O NST: CPT

## 2020-09-16 PROCEDURE — 6370000000 HC RX 637 (ALT 250 FOR IP): Performed by: LEGAL MEDICINE

## 2020-09-16 PROCEDURE — 3700000000 HC ANESTHESIA ATTENDED CARE: Performed by: LEGAL MEDICINE

## 2020-09-16 PROCEDURE — 86901 BLOOD TYPING SEROLOGIC RH(D): CPT

## 2020-09-16 PROCEDURE — 85027 COMPLETE CBC AUTOMATED: CPT

## 2020-09-16 PROCEDURE — 3700000001 HC ADD 15 MINUTES (ANESTHESIA): Performed by: LEGAL MEDICINE

## 2020-09-16 PROCEDURE — 59514 CESAREAN DELIVERY ONLY: CPT | Performed by: ADVANCED PRACTICE MIDWIFE

## 2020-09-16 PROCEDURE — 6360000002 HC RX W HCPCS: Performed by: LEGAL MEDICINE

## 2020-09-16 PROCEDURE — 1220000001 HC SEMI PRIVATE L&D R&B

## 2020-09-16 PROCEDURE — 80053 COMPREHEN METABOLIC PANEL: CPT

## 2020-09-16 PROCEDURE — 80307 DRUG TEST PRSMV CHEM ANLYZR: CPT

## 2020-09-16 PROCEDURE — 2709999900 HC NON-CHARGEABLE SUPPLY: Performed by: LEGAL MEDICINE

## 2020-09-16 PROCEDURE — 7100000001 HC PACU RECOVERY - ADDTL 15 MIN: Performed by: LEGAL MEDICINE

## 2020-09-16 PROCEDURE — 2500000003 HC RX 250 WO HCPCS: Performed by: NURSE ANESTHETIST, CERTIFIED REGISTERED

## 2020-09-16 PROCEDURE — 6360000002 HC RX W HCPCS: Performed by: NURSE ANESTHETIST, CERTIFIED REGISTERED

## 2020-09-16 PROCEDURE — 36415 COLL VENOUS BLD VENIPUNCTURE: CPT

## 2020-09-16 PROCEDURE — 3609079900 HC CESAREAN SECTION: Performed by: LEGAL MEDICINE

## 2020-09-16 PROCEDURE — 86900 BLOOD TYPING SEROLOGIC ABO: CPT

## 2020-09-16 PROCEDURE — 88307 TISSUE EXAM BY PATHOLOGIST: CPT

## 2020-09-16 RX ORDER — SODIUM CHLORIDE, SODIUM LACTATE, POTASSIUM CHLORIDE, AND CALCIUM CHLORIDE .6; .31; .03; .02 G/100ML; G/100ML; G/100ML; G/100ML
1000 INJECTION, SOLUTION INTRAVENOUS ONCE
Status: COMPLETED | OUTPATIENT
Start: 2020-09-16 | End: 2020-09-16

## 2020-09-16 RX ORDER — ONDANSETRON 2 MG/ML
4 INJECTION INTRAMUSCULAR; INTRAVENOUS EVERY 6 HOURS PRN
Status: DISCONTINUED | OUTPATIENT
Start: 2020-09-16 | End: 2020-09-16 | Stop reason: SDUPTHER

## 2020-09-16 RX ORDER — ONDANSETRON 2 MG/ML
INJECTION INTRAMUSCULAR; INTRAVENOUS PRN
Status: DISCONTINUED | OUTPATIENT
Start: 2020-09-16 | End: 2020-09-16 | Stop reason: SDUPTHER

## 2020-09-16 RX ORDER — DOCUSATE SODIUM 100 MG/1
100 CAPSULE, LIQUID FILLED ORAL 2 TIMES DAILY
Status: DISCONTINUED | OUTPATIENT
Start: 2020-09-16 | End: 2020-09-18 | Stop reason: HOSPADM

## 2020-09-16 RX ORDER — OXYCODONE HYDROCHLORIDE 5 MG/1
5 TABLET ORAL EVERY 4 HOURS PRN
Status: DISCONTINUED | OUTPATIENT
Start: 2020-09-16 | End: 2020-09-16 | Stop reason: SDUPTHER

## 2020-09-16 RX ORDER — MORPHINE SULFATE 1 MG/ML
INJECTION, SOLUTION EPIDURAL; INTRATHECAL; INTRAVENOUS PRN
Status: DISCONTINUED | OUTPATIENT
Start: 2020-09-16 | End: 2020-09-16 | Stop reason: SDUPTHER

## 2020-09-16 RX ORDER — CEFOXITIN SODIUM 2 G/50ML
2 INJECTION, SOLUTION INTRAVENOUS
Status: COMPLETED | OUTPATIENT
Start: 2020-09-16 | End: 2020-09-16

## 2020-09-16 RX ORDER — OXYCODONE HYDROCHLORIDE 5 MG/1
10 TABLET ORAL EVERY 4 HOURS PRN
Status: DISCONTINUED | OUTPATIENT
Start: 2020-09-16 | End: 2020-09-18 | Stop reason: HOSPADM

## 2020-09-16 RX ORDER — SODIUM CHLORIDE 0.9 % (FLUSH) 0.9 %
10 SYRINGE (ML) INJECTION EVERY 12 HOURS SCHEDULED
Status: DISCONTINUED | OUTPATIENT
Start: 2020-09-16 | End: 2020-09-16

## 2020-09-16 RX ORDER — SODIUM CHLORIDE, SODIUM LACTATE, POTASSIUM CHLORIDE, CALCIUM CHLORIDE 600; 310; 30; 20 MG/100ML; MG/100ML; MG/100ML; MG/100ML
INJECTION, SOLUTION INTRAVENOUS CONTINUOUS
Status: DISCONTINUED | OUTPATIENT
Start: 2020-09-16 | End: 2020-09-16

## 2020-09-16 RX ORDER — ACETAMINOPHEN 325 MG/1
325 TABLET ORAL EVERY 4 HOURS PRN
Status: DISCONTINUED | OUTPATIENT
Start: 2020-09-16 | End: 2020-09-18 | Stop reason: HOSPADM

## 2020-09-16 RX ORDER — SODIUM CHLORIDE, SODIUM LACTATE, POTASSIUM CHLORIDE, CALCIUM CHLORIDE 600; 310; 30; 20 MG/100ML; MG/100ML; MG/100ML; MG/100ML
INJECTION, SOLUTION INTRAVENOUS CONTINUOUS
Status: DISCONTINUED | OUTPATIENT
Start: 2020-09-16 | End: 2020-09-18 | Stop reason: HOSPADM

## 2020-09-16 RX ORDER — METHYLERGONOVINE MALEATE 0.2 MG/ML
200 INJECTION INTRAVENOUS PRN
Status: DISCONTINUED | OUTPATIENT
Start: 2020-09-16 | End: 2020-09-18 | Stop reason: HOSPADM

## 2020-09-16 RX ORDER — OXYCODONE HYDROCHLORIDE 5 MG/1
10 TABLET ORAL EVERY 4 HOURS PRN
Status: DISCONTINUED | OUTPATIENT
Start: 2020-09-16 | End: 2020-09-16 | Stop reason: SDUPTHER

## 2020-09-16 RX ORDER — SODIUM CHLORIDE 0.9 % (FLUSH) 0.9 %
10 SYRINGE (ML) INJECTION PRN
Status: DISCONTINUED | OUTPATIENT
Start: 2020-09-16 | End: 2020-09-16

## 2020-09-16 RX ORDER — TRISODIUM CITRATE DIHYDRATE AND CITRIC ACID MONOHYDRATE 500; 334 MG/5ML; MG/5ML
30 SOLUTION ORAL ONCE
Status: COMPLETED | OUTPATIENT
Start: 2020-09-16 | End: 2020-09-16

## 2020-09-16 RX ORDER — METHYLERGONOVINE MALEATE 0.2 MG/ML
INJECTION INTRAVENOUS PRN
Status: DISCONTINUED | OUTPATIENT
Start: 2020-09-16 | End: 2020-09-16 | Stop reason: SDUPTHER

## 2020-09-16 RX ORDER — OXYCODONE HYDROCHLORIDE 5 MG/1
5 TABLET ORAL EVERY 4 HOURS PRN
Status: DISCONTINUED | OUTPATIENT
Start: 2020-09-16 | End: 2020-09-18 | Stop reason: HOSPADM

## 2020-09-16 RX ORDER — CARBOPROST TROMETHAMINE 250 UG/ML
250 INJECTION, SOLUTION INTRAMUSCULAR PRN
Status: DISCONTINUED | OUTPATIENT
Start: 2020-09-16 | End: 2020-09-16 | Stop reason: SDUPTHER

## 2020-09-16 RX ORDER — ONDANSETRON 2 MG/ML
4 INJECTION INTRAMUSCULAR; INTRAVENOUS EVERY 6 HOURS PRN
Status: DISCONTINUED | OUTPATIENT
Start: 2020-09-16 | End: 2020-09-18 | Stop reason: HOSPADM

## 2020-09-16 RX ORDER — CEFOXITIN SODIUM 2 G/50ML
2 INJECTION, SOLUTION INTRAVENOUS ONCE
Status: DISCONTINUED | OUTPATIENT
Start: 2020-09-16 | End: 2020-09-16 | Stop reason: SDUPTHER

## 2020-09-16 RX ORDER — PRENATAL WITH FERROUS FUM AND FOLIC ACID 3080; 920; 120; 400; 22; 1.84; 3; 20; 10; 1; 12; 200; 27; 25; 2 [IU]/1; [IU]/1; MG/1; [IU]/1; MG/1; MG/1; MG/1; MG/1; MG/1; MG/1; UG/1; MG/1; MG/1; MG/1; MG/1
1 TABLET ORAL DAILY
Status: DISCONTINUED | OUTPATIENT
Start: 2020-09-16 | End: 2020-09-18 | Stop reason: HOSPADM

## 2020-09-16 RX ORDER — SODIUM CHLORIDE 0.9 % (FLUSH) 0.9 %
10 SYRINGE (ML) INJECTION PRN
Status: DISCONTINUED | OUTPATIENT
Start: 2020-09-16 | End: 2020-09-18 | Stop reason: HOSPADM

## 2020-09-16 RX ORDER — METOCLOPRAMIDE 10 MG/1
10 TABLET ORAL
Status: DISCONTINUED | OUTPATIENT
Start: 2020-09-17 | End: 2020-09-18 | Stop reason: HOSPADM

## 2020-09-16 RX ORDER — SODIUM CHLORIDE 0.9 % (FLUSH) 0.9 %
10 SYRINGE (ML) INJECTION EVERY 12 HOURS SCHEDULED
Status: DISCONTINUED | OUTPATIENT
Start: 2020-09-16 | End: 2020-09-18 | Stop reason: HOSPADM

## 2020-09-16 RX ORDER — ACETAMINOPHEN 500 MG
1000 TABLET ORAL EVERY 6 HOURS
Status: DISCONTINUED | OUTPATIENT
Start: 2020-09-16 | End: 2020-09-18 | Stop reason: HOSPADM

## 2020-09-16 RX ORDER — MODIFIED LANOLIN
OINTMENT (GRAM) TOPICAL
Status: DISCONTINUED | OUTPATIENT
Start: 2020-09-16 | End: 2020-09-18 | Stop reason: HOSPADM

## 2020-09-16 RX ORDER — NALBUPHINE HCL 10 MG/ML
5 AMPUL (ML) INJECTION EVERY 4 HOURS PRN
Status: DISCONTINUED | OUTPATIENT
Start: 2020-09-16 | End: 2020-09-18 | Stop reason: HOSPADM

## 2020-09-16 RX ORDER — NALOXONE HYDROCHLORIDE 0.4 MG/ML
0.4 INJECTION, SOLUTION INTRAMUSCULAR; INTRAVENOUS; SUBCUTANEOUS PRN
Status: DISCONTINUED | OUTPATIENT
Start: 2020-09-16 | End: 2020-09-16 | Stop reason: SDUPTHER

## 2020-09-16 RX ORDER — DIPHENHYDRAMINE HYDROCHLORIDE 50 MG/ML
25 INJECTION INTRAMUSCULAR; INTRAVENOUS EVERY 6 HOURS PRN
Status: DISCONTINUED | OUTPATIENT
Start: 2020-09-16 | End: 2020-09-18 | Stop reason: HOSPADM

## 2020-09-16 RX ORDER — FERROUS SULFATE 325(65) MG
325 TABLET ORAL
Status: DISCONTINUED | OUTPATIENT
Start: 2020-09-16 | End: 2020-09-18 | Stop reason: HOSPADM

## 2020-09-16 RX ORDER — NALOXONE HYDROCHLORIDE 0.4 MG/ML
0.4 INJECTION, SOLUTION INTRAMUSCULAR; INTRAVENOUS; SUBCUTANEOUS PRN
Status: DISCONTINUED | OUTPATIENT
Start: 2020-09-16 | End: 2020-09-18 | Stop reason: HOSPADM

## 2020-09-16 RX ORDER — BUPIVACAINE HYDROCHLORIDE 7.5 MG/ML
INJECTION, SOLUTION INTRASPINAL PRN
Status: DISCONTINUED | OUTPATIENT
Start: 2020-09-16 | End: 2020-09-16 | Stop reason: SDUPTHER

## 2020-09-16 RX ADMIN — Medication 999 ML/HR: at 15:38

## 2020-09-16 RX ADMIN — ACETAMINOPHEN 1000 MG: 500 TABLET, FILM COATED ORAL at 22:18

## 2020-09-16 RX ADMIN — ONDANSETRON 4 MG: 2 INJECTION INTRAMUSCULAR; INTRAVENOUS at 15:39

## 2020-09-16 RX ADMIN — DIPHENHYDRAMINE HYDROCHLORIDE 25 MG: 50 INJECTION, SOLUTION INTRAMUSCULAR; INTRAVENOUS at 19:49

## 2020-09-16 RX ADMIN — SODIUM CHLORIDE, POTASSIUM CHLORIDE, SODIUM LACTATE AND CALCIUM CHLORIDE: 600; 310; 30; 20 INJECTION, SOLUTION INTRAVENOUS at 15:52

## 2020-09-16 RX ADMIN — OXYCODONE HYDROCHLORIDE 10 MG: 5 TABLET ORAL at 23:57

## 2020-09-16 RX ADMIN — SODIUM CHLORIDE, POTASSIUM CHLORIDE, SODIUM LACTATE AND CALCIUM CHLORIDE 1000 ML: 600; 310; 30; 20 INJECTION, SOLUTION INTRAVENOUS at 10:50

## 2020-09-16 RX ADMIN — METHYLERGONOVINE MALEATE 200 MCG: 0.2 INJECTION INTRAMUSCULAR; INTRAVENOUS at 15:42

## 2020-09-16 RX ADMIN — DOCUSATE SODIUM 100 MG: 100 CAPSULE, LIQUID FILLED ORAL at 22:20

## 2020-09-16 RX ADMIN — SODIUM CHLORIDE, POTASSIUM CHLORIDE, SODIUM LACTATE AND CALCIUM CHLORIDE: 600; 310; 30; 20 INJECTION, SOLUTION INTRAVENOUS at 12:06

## 2020-09-16 RX ADMIN — SODIUM CITRATE AND CITRIC ACID MONOHYDRATE 30 ML: 500; 334 SOLUTION ORAL at 14:53

## 2020-09-16 RX ADMIN — Medication 50 ML/HR: at 19:10

## 2020-09-16 RX ADMIN — CEFOXITIN SODIUM 2 G: 2 INJECTION, SOLUTION INTRAVENOUS at 15:07

## 2020-09-16 RX ADMIN — SODIUM CHLORIDE, POTASSIUM CHLORIDE, SODIUM LACTATE AND CALCIUM CHLORIDE: 600; 310; 30; 20 INJECTION, SOLUTION INTRAVENOUS at 15:14

## 2020-09-16 RX ADMIN — BUPIVACAINE HYDROCHLORIDE IN DEXTROSE 1.8 ML: 7.5 INJECTION, SOLUTION SUBARACHNOID at 15:21

## 2020-09-16 RX ADMIN — MORPHINE SULFATE 0.15 MG: 1 INJECTION, SOLUTION EPIDURAL; INTRATHECAL; INTRAVENOUS at 15:21

## 2020-09-16 ASSESSMENT — PULMONARY FUNCTION TESTS
PIF_VALUE: 0
PIF_VALUE: 1
PIF_VALUE: 0
PIF_VALUE: 1
PIF_VALUE: 0
PIF_VALUE: 0
PIF_VALUE: 1
PIF_VALUE: 0
PIF_VALUE: 1
PIF_VALUE: 0
PIF_VALUE: 1

## 2020-09-16 ASSESSMENT — PAIN SCALES - GENERAL
PAINLEVEL_OUTOF10: 7
PAINLEVEL_OUTOF10: 4
PAINLEVEL_OUTOF10: 0
PAINLEVEL_OUTOF10: 2
PAINLEVEL_OUTOF10: 0
PAINLEVEL_OUTOF10: 0

## 2020-09-16 ASSESSMENT — PAIN - FUNCTIONAL ASSESSMENT: PAIN_FUNCTIONAL_ASSESSMENT: 0-10

## 2020-09-16 ASSESSMENT — PAIN DESCRIPTION - DESCRIPTORS
DESCRIPTORS: DISCOMFORT
DESCRIPTORS: ACHING

## 2020-09-16 ASSESSMENT — PAIN DESCRIPTION - PAIN TYPE: TYPE: SURGICAL PAIN

## 2020-09-16 ASSESSMENT — PAIN DESCRIPTION - ORIENTATION: ORIENTATION: MID;LOWER

## 2020-09-16 ASSESSMENT — PAIN DESCRIPTION - ONSET: ONSET: GRADUAL

## 2020-09-16 ASSESSMENT — PAIN DESCRIPTION - LOCATION: LOCATION: ABDOMEN

## 2020-09-16 ASSESSMENT — PAIN DESCRIPTION - FREQUENCY: FREQUENCY: INTERMITTENT

## 2020-09-16 NOTE — H&P
[address]                              HISTORY AND PHYSICAL    PATIENT NAME: Markel Ibarra                :        1995  MED REC NO:   [id number]                            ROOM:       [facility]  ACCOUNT NO:   [de-identified]                           ADMIT DATE: 2020. PROVIDER:     Naye Jenkins MD      HISTORY OF PRESENT ILLNESS:   60-year-old white female presented on  2020 with complaints of contractions and vaginal pain. No leaking  fluid or vaginal bleeding. No change in fetal movements. No change in  her bowel or urinary habits. She has a gestational diabetes in good  control. Group B strep is negative. She underwent biophysical profile  which demonstrated oligohydramnios with amniotic fluid index of less  than 1 cm. Biophysical profile was 6/8. Findings were reviewed with  her. She has a previous  and is declining . We will  therefore proceed with . For her past medical, surgical, GYN,  social, and family history, please refer to ACOG forms A and B. REVIEW OF SYSTEMS:  Negative for cardiac, respiratory, GI, ,  neurologic, psychiatric, ENT, integument, hematologic, lymphatic,  constitutional abnormalities. PHYSICAL EXAMINATION:  VITAL SIGNS:  Stable. Blood pressure 124/75, pulse 71, temperature 98. HEENT:  Negative. LUNGS:  Clear to auscultation. CV:  Regular rate and rhythm. ABDOMEN:  Gravid, soft, nontender. No rebound, no guarding. Normal  bowel sounds are present. Fetal heart tones are present in the 140s  with pjyy-ge-rgaq variability present. Accelerations noted. Irregular  contractions. Cervix closed and long. AmniSure was negative. EXTREMITIES:  No clubbing, cyanosis. 2+ edema. NEURO:  CN II through XII are grossly intact. She is alert and oriented  x4.     ASSESSMENT:  Intrauterine pregnancy at 38-1/2 weeks

## 2020-09-16 NOTE — PROGRESS NOTES
Patient returned from [address]. Back to bed to position of comfort, patient perceives fetal movement, co abd pressure. Plan of care reviewed.

## 2020-09-16 NOTE — PROGRESS NOTES
Assumed care of patient with bedside report at 1430. Plan of care discussed with patient, who verbalized understanding with no questions. Patient denies any pain or other needs at this time. IV fluids infusing, ewing catheter patent.

## 2020-09-16 NOTE — ANESTHESIA PRE PROCEDURE
Department of Anesthesiology  Preprocedure Note       Name:  Will Mckinley   Age:  25 y. o.  :  1995                                          MRN:  [id number]         Date:  2020      Surgeon: Pritesh Garcia):  Jefferson Munoz MD    Procedure: Procedure(s):  REPEAT  SECTION    Medications prior to admission:   Prior to Admission medications    Medication Sig Start Date End Date Taking? Authorizing Provider   Ferrous Sulfate (IRON PO) Take by mouth    Historical Provider, MD   acetaminophen (TYLENOL) 500 MG tablet Take 1,000 mg by mouth every 6 hours as needed for Pain    Historical Provider, MD   Prenatal Vit-Fe Fumarate-FA (PRENATAL 1+1 PO) Take 1 tablet by mouth daily     Historical Provider, MD       Current medications:    Current Facility-Administered Medications   Medication Dose Route Frequency Provider Last Rate Last Dose    lactated ringers infusion   Intravenous Continuous Jefferson Munoz MD        lactated ringers bolus  1,000 mL Intravenous Once Jefferson Munoz MD        sodium chloride flush 0.9 % injection 10 mL  10 mL Intravenous 2 times per day Jefferson Munoz MD        sodium chloride flush 0.9 % injection 10 mL  10 mL Intravenous PRN Jefferson Munoz MD        citric acid-sodium citrate (BICITRA) solution 30 mL  30 mL Oral Once Jefferson Munoz MD           Allergies:     Allergies   Allergen Reactions    Pcn [Penicillins] Other (See Comments)     Pt unaware of symptoms       Problem List:    Patient Active Problem List   Diagnosis Code    Liveborn by  Z38.01    Chronic female pelvic pain R10.2, G89.29    Endometriosis of pelvis N80.3    Miscarriage O03.9    Retained placenta O73.0    24 weeks gestation of pregnancy Z3A.24    27 weeks gestation of pregnancy Z3A.27    37 weeks gestation of pregnancy E7O.59    Complication of pregnancy in third trimester O26.93    Normal pregnancy in third trimester Z34.93       Past Medical History:        Diagnosis Date    Depression     ADHD    Endometriosis of pelvis 2018    Prolonged emergence from general anesthesia     slow to wake up after c section       Past Surgical History:        Procedure Laterality Date     SECTION      DILATION AND CURETTAGE OF UTERUS N/A 2019    DILATATION AND CURETTAGE SUCTION performed by Jayne Graham MD at [address] [place] [address]  2017    MIDDLE EAR SURGERY      MYRINGOTOMY AND TYMPANOSTOMY TUBE PLACEMENT      [place] LAP,LYSIS OF ADHESIONS N/A 2018    DIAGNOSTIC LAPAROSCOPY. aspiration of left ovarian cyst performed by Marycruz Richard MD at [place] [address] History:    Social History     Tobacco Use    Smoking status: Never Smoker    Smokeless tobacco: Never Used   Substance Use Topics    Alcohol use:  No                                Counseling given: Not Answered      Vital Signs (Current):   Vitals:    20 0625 20 0820   BP: 124/75 119/85   Pulse: 71 67   Resp: 17 16   Temp: 98 °F (36.7 °C)    TempSrc: Oral                                               BP Readings from Last 3 Encounters:   20 119/85   20 113/65   20 113/73       NPO Status:                                                                                 BMI:   Wt Readings from Last 3 Encounters:   20 205 lb (93 kg)   20 178 lb 6.4 oz (80.9 kg)   20 186 lb (84.4 kg)     There is no height or weight on file to calculate BMI.    CBC:   Lab Results   Component Value Date    WBC 6.2 2020    RBC 3.83 2020    HGB 9.5 2020    HCT 30.6 2020    MCV 79.9 2020    RDW 18.8 2020     2020       CMP:   Lab Results   Component Value Date     2020    K 4.1 2020    K 4.3 2020    CL 99 2020    CO2 23 2020    BUN 8 2020    CREATININE 0.6 2020    GFRAA >60 2020    LABGLOM >60 2020    GLUCOSE 92 2020    PROT 7.5 2020

## 2020-09-16 NOTE — PROGRESS NOTES
Dr. Re Coello notified of patient's arrival, complaint, FHR tracing, uterine activity, and cervical exam. Orders received.

## 2020-09-16 NOTE — PROGRESS NOTES
Skin to skin initiated mother with baby at 200. Baby alert, pink, respirations regular. Mother educated on safe skin to skin practices with proper position of baby and assurance of unobstructed airway; verbalized understanding.

## 2020-09-16 NOTE — PROGRESS NOTES
Patient spoke with Dr. Gael Crisostomo by phone, reviewed results of BPP. Plan of  section this afternoon discussed. No co's offered at this time.

## 2020-09-16 NOTE — PROGRESS NOTES
Patient  38w4d to L&D complaining of contractions. Patient perceives fetal movement and fetal well being established via EFM.

## 2020-09-16 NOTE — OP NOTE
[address]                                OPERATIVE REPORT    PATIENT NAME: Kannan Palm                :        1995  MED REC NO:   [id number]                            ROOM:       [facility]  ACCOUNT NO:   [de-identified]                           ADMIT DATE: 2020. PROVIDER:     Sharona Ayala MD    DATE OF PROCEDURE:  2020    PREOPERATIVE DIAGNOSES:  Intrauterine pregnancy at 38-1/2 weeks with  oligohydramnios. Previous , declining . POSTOPERATIVE DIAGNOSES:  Intrauterine pregnancy at 38-1/2 weeks with  oligohydramnios. Previous , declining . PROCEDURE PERFORMED:  Repeat low transverse . SURGEON:  Sharona Ayala MD    ANESTHESIA:  Spinal.    EBL:  500. REPLACEMENT:  1500. URINE OUTPUT:  150. FINDINGS:  Male infant weighing 7 pounds 12 ounces with cord arterial  gas pH 7.293, base excess -1.1, cord venous gas pH 7.351, base excess  -1.0. Normal-appearing tubes and ovaries. COMPLICATIONS:  None. COUNTS:  Sponge, lap, and needle counts were correct. DISPOSITION:  The patient went to recovery room in stable condition. PROCEDURE IN DETAIL:  After informed consent was obtained, and under an  adequate level of spinal anesthesia, the patient's abdomen was prepped  with DuraPrep. The patient was grounded. The patient was draped with  sterile drapes. Low-transverse skin incision was made two  fingerbreadths above the pubic bone. Incision was taken down to the  level of fascia. Fascia was nicked in midline and the incision was  carried upwards bilaterally. Care was made to assure that the fascial  incision did not extend beyond the borders of the rectus muscle. Fascia  was dissected off the bellies of rectus muscle. Bellies of rectus  muscle was sharply .   Peritoneum was tented and sharply entered  with care to avoid bowel or bladder. Incision was carried out cephalad  and caudad. Bladder flap was made. Low uterine cervical transverse  incision was made and carried upwards bilaterally using surgeon's  fingers. Clear fluid issued. Fetal vertex was identified and gently  delivered into the incision. Fetal trunk was delivered with minimal  traction applied in an axis parallel to the fetal spine. Nares and  hypopharynx were suctioned. Cord was clamped and cut. Infant was  crying and vigorous. Cord gases and blood samples were obtained. Placenta was removed spontaneously. Note was made of an intact two  artery, one vein cord placenta which was sent to pathology. Uterus was  curettaged with lap x2. Extensive amounts of placental membranes were noted. What could be removed of the membranes was removed. Some of the  membranes were deeply adherent and attempts to remove it was causing  quite a bit of bleeding and therefore this was not continued. The  membranes were anticipated to fall of off gradually on there own. Uterine incision was reapproximated in two layers using a running  locking stitch of 0 Vicryl. All clots were evacuated. All pedicles  were examined were hemostatic. Parietal peritoneum was reapproximated  in running fashion. Fascia was reapproximated in interrupted fashion  with sutures placed 1 cm apart and 2 cm in and with care to avoid injury  to the ilioinguinal or iliohypogastric nerves. Subcu was reapproximated  in interrupted fashion. Skin was reapproximated in running fashion. Sponge, lap, and needle counts were correct. There were no  complications. The patient tolerated the procedure well and went to  recovery room in stable condition.         Mike Craven MD    D: 09/16/2020 17:13:38       T: 09/16/2020 17:21:33     [email or web address]  Job#: [id number]     Doc#: [id number]    CC:

## 2020-09-17 LAB
HEMOGLOBIN: 9.1 G/DL (ref 11.5–15.5)
METER GLUCOSE: 98 MG/DL (ref 74–99)

## 2020-09-17 PROCEDURE — 36415 COLL VENOUS BLD VENIPUNCTURE: CPT

## 2020-09-17 PROCEDURE — 1220000001 HC SEMI PRIVATE L&D R&B

## 2020-09-17 PROCEDURE — 82962 GLUCOSE BLOOD TEST: CPT

## 2020-09-17 PROCEDURE — 6370000000 HC RX 637 (ALT 250 FOR IP): Performed by: LEGAL MEDICINE

## 2020-09-17 PROCEDURE — 85018 HEMOGLOBIN: CPT

## 2020-09-17 PROCEDURE — 2580000003 HC RX 258: Performed by: LEGAL MEDICINE

## 2020-09-17 RX ADMIN — METOCLOPRAMIDE 10 MG: 10 TABLET ORAL at 09:29

## 2020-09-17 RX ADMIN — METOCLOPRAMIDE 10 MG: 10 TABLET ORAL at 13:26

## 2020-09-17 RX ADMIN — OXYCODONE HYDROCHLORIDE 10 MG: 5 TABLET ORAL at 09:34

## 2020-09-17 RX ADMIN — ACETAMINOPHEN 1000 MG: 500 TABLET, FILM COATED ORAL at 03:54

## 2020-09-17 RX ADMIN — FERROUS SULFATE TAB 325 MG (65 MG ELEMENTAL FE) 325 MG: 325 (65 FE) TAB at 09:30

## 2020-09-17 RX ADMIN — ACETAMINOPHEN 1000 MG: 500 TABLET, FILM COATED ORAL at 23:49

## 2020-09-17 RX ADMIN — DOCUSATE SODIUM 100 MG: 100 CAPSULE, LIQUID FILLED ORAL at 21:17

## 2020-09-17 RX ADMIN — METOCLOPRAMIDE 10 MG: 10 TABLET ORAL at 17:56

## 2020-09-17 RX ADMIN — FERROUS SULFATE TAB 325 MG (65 MG ELEMENTAL FE) 325 MG: 325 (65 FE) TAB at 17:56

## 2020-09-17 RX ADMIN — DOCUSATE SODIUM 100 MG: 100 CAPSULE, LIQUID FILLED ORAL at 09:30

## 2020-09-17 RX ADMIN — PRENATAL WITH FERROUS FUM AND FOLIC ACID 1 TABLET: 3080; 920; 120; 400; 22; 1.84; 3; 20; 10; 1; 12; 200; 27; 25; 2 TABLET ORAL at 09:29

## 2020-09-17 RX ADMIN — ACETAMINOPHEN 1000 MG: 500 TABLET, FILM COATED ORAL at 17:48

## 2020-09-17 RX ADMIN — FERROUS SULFATE TAB 325 MG (65 MG ELEMENTAL FE) 325 MG: 325 (65 FE) TAB at 13:26

## 2020-09-17 RX ADMIN — OXYCODONE HYDROCHLORIDE 10 MG: 5 TABLET ORAL at 19:27

## 2020-09-17 RX ADMIN — SODIUM CHLORIDE, PRESERVATIVE FREE 10 ML: 5 INJECTION INTRAVENOUS at 09:30

## 2020-09-17 RX ADMIN — OXYCODONE HYDROCHLORIDE 10 MG: 5 TABLET ORAL at 23:50

## 2020-09-17 ASSESSMENT — PAIN SCALES - GENERAL
PAINLEVEL_OUTOF10: 8
PAINLEVEL_OUTOF10: 7
PAINLEVEL_OUTOF10: 10
PAINLEVEL_OUTOF10: 9
PAINLEVEL_OUTOF10: 5

## 2020-09-17 ASSESSMENT — PAIN - FUNCTIONAL ASSESSMENT
PAIN_FUNCTIONAL_ASSESSMENT: 0-10

## 2020-09-17 NOTE — PROGRESS NOTES
IV to prn adapter per order. pericare given with ewing removal per order. Instructed pt to call for assistance with urge to void. Pt verbalizes understanding. Rest encouraged.

## 2020-09-17 NOTE — PROGRESS NOTES
Recovery complete at [phone], and patient taken to postpartum room 215 by bed, alert and oriented. Oriented patient to her new postpartum room. Plan of care discussed with patient, who verbalized understanding with no questions. IV fluids infusing, SCDs on, ewing patent, family visiting, and call light within reach.

## 2020-09-17 NOTE — PROGRESS NOTES
Continued care of pt for 7-11a shift. .safe sleep practices reviewed and discussed. Pt verbalizes understanding of need for baby to sleep in crib. Rest encouraged.

## 2020-09-17 NOTE — ANESTHESIA POSTPROCEDURE EVALUATION
Department of Anesthesiology  Postprocedure Note    Patient: Evie Cano  MRN: [id number]  YOB: 1995  Date of evaluation: 2020  Time:  8:29 PM     Procedure Summary     Date:  20 Room / Location:  [facility] L&D OR  [address] [facility]    Anesthesia Start:  3725 Anesthesia Stop:      Procedure:  REPEAT  SECTION (N/A Abdomen) Diagnosis:  (FULL TERM PREGNANCY)    Surgeon:  Mitchel Dalal MD Responsible Provider:  Shahida Lam MD    Anesthesia Type:  spinal ASA Status:  2          Anesthesia Type: spinal    Zenobia Phase I: Zenobia Score: 9    Zenobia Phase II:      Last vitals: Reviewed and per EMR flowsheets.        Anesthesia Post Evaluation    Patient location during evaluation: PACU  Patient participation: complete - patient participated  Level of consciousness: awake and alert  Airway patency: patent  Nausea & Vomiting: no vomiting and no nausea  Complications: no  Cardiovascular status: hemodynamically stable  Respiratory status: acceptable  Hydration status: stable

## 2020-09-17 NOTE — DISCHARGE SUMMARY
[address] [facility]                               [facility]GE SUMMARY    PATIENT NAME: Kristie Slater                :        1995  MED REC NO:   [id number]                            ROOM:       0215  ACCOUNT NO:   [de-identified]                           ADMIT DATE: 2020. PROVIDER:     Patrizia Tavares MD                  DISCHARGE DATE:    ADMITTING DIAGNOSES:  Intrauterine pregnancy, at term, with complaints  of vaginal pain and cramping. DISCHARGE DIAGNOSIS:  Oligohydramnios. PROCEDURE PERFORMED:  Repeat low transverse . HOSPITAL COURSE IN DETAIL:  The patient is doing well. No nausea or  vomiting. Pain is under adequate control. Wishes to go home tomorrow. Admission labs revealed a white count of 4.9, hemoglobin 9.3, platelets  493,340. Creatinine 0.6. ALT 6, AST 23.  Glucose 78. Fasting glucose  this morning is 98. Postpartum day #1 hemoglobin is 9.1. She is  afebrile. Heart rate 68-70, blood pressure 122-120 over 72-68, O2 sat  98% on room air. Urine output is 600-1025 mL per shift. She is ahead  1.5 L. Lungs:  Clear to auscultation. CV:  Regular rate, rhythm. Abdomen:  Obese, soft, nondistended, appropriately tender. No rebound  or guarding. Normal bowel sounds are present. Fundus is firm and two  fingerbreadths below the umbilicus. Incision dry and intact. Perineum  dry and intact. ASSESSMENT:  The patient is doing well, postop day #1, stable, wishes to  go home tomorrow. PLAN:  Home tomorrow once meets discharge criteria with fever, bleeding,  emboli, lifting, climbing, driving precautions. She is to follow up at  the office in 6 weeks. She indicates understanding of all instructions.         Chilo Miles MD    D: 2020 8:07:14       T: 2020 8:15:22     [email or web address]  Job#: [id number]     Doc#: [id number]    CC:

## 2020-09-18 VITALS
DIASTOLIC BLOOD PRESSURE: 69 MMHG | TEMPERATURE: 98.2 F | HEART RATE: 111 BPM | RESPIRATION RATE: 17 BRPM | HEIGHT: 67 IN | SYSTOLIC BLOOD PRESSURE: 116 MMHG | OXYGEN SATURATION: 97 % | BODY MASS INDEX: 33.27 KG/M2 | WEIGHT: 212 LBS

## 2020-09-18 PROCEDURE — 6370000000 HC RX 637 (ALT 250 FOR IP): Performed by: LEGAL MEDICINE

## 2020-09-18 RX ADMIN — ACETAMINOPHEN 1000 MG: 500 TABLET, FILM COATED ORAL at 10:54

## 2020-09-18 RX ADMIN — FERROUS SULFATE TAB 325 MG (65 MG ELEMENTAL FE) 325 MG: 325 (65 FE) TAB at 07:59

## 2020-09-18 RX ADMIN — DOCUSATE SODIUM 100 MG: 100 CAPSULE, LIQUID FILLED ORAL at 07:59

## 2020-09-18 RX ADMIN — OXYCODONE HYDROCHLORIDE 10 MG: 5 TABLET ORAL at 07:59

## 2020-09-18 RX ADMIN — OXYCODONE HYDROCHLORIDE 10 MG: 5 TABLET ORAL at 03:53

## 2020-09-18 RX ADMIN — METOCLOPRAMIDE 10 MG: 10 TABLET ORAL at 07:58

## 2020-09-18 RX ADMIN — PRENATAL WITH FERROUS FUM AND FOLIC ACID 1 TABLET: 3080; 920; 120; 400; 22; 1.84; 3; 20; 10; 1; 12; 200; 27; 25; 2 TABLET ORAL at 08:05

## 2020-09-18 RX ADMIN — ACETAMINOPHEN 1000 MG: 500 TABLET, FILM COATED ORAL at 05:48

## 2020-09-18 RX ADMIN — METOCLOPRAMIDE 10 MG: 10 TABLET ORAL at 10:55

## 2020-09-18 ASSESSMENT — PAIN SCALES - GENERAL
PAINLEVEL_OUTOF10: 7
PAINLEVEL_OUTOF10: 6
PAINLEVEL_OUTOF10: 8
PAINLEVEL_OUTOF10: 5

## 2020-09-18 NOTE — PLAN OF CARE
Problem: Fluid Volume - Imbalance:  Goal: Absence of postpartum hemorrhage signs and symptoms  Description: Absence of postpartum hemorrhage signs and symptoms  9/18/2020 0017 by Lindsay Elliott RN  Outcome: Met This Shift     Problem: Infection - Surgical Site:  Goal: Will show no infection signs and symptoms  Description: Will show no infection signs and symptoms  9/18/2020 0017 by Lindsay Elliott RN  Outcome: Met This Shift     Problem: Mood - Altered:  Goal: Mood stable  Description: Mood stable  9/18/2020 0017 by Lindsay Elliott RN  Outcome: Met This Shift

## 2020-09-18 NOTE — PROGRESS NOTES
Assumed care of pt for this shift. Discussed plan of care for the shift. Pt verbalizes understanding without questions at this time. Rest and ambulation encouraged.

## 2020-09-18 NOTE — PLAN OF CARE
Problem: Infection - Surgical Site:  Goal: Will show no infection signs and symptoms  Description: Will show no infection signs and symptoms  9/18/2020 0853 by Gilma Devi, RN  Note: Reviewed incision care with patient ,she verbalizes good understanding.   9/18/2020 0017 by Nichelle Khan RN  Outcome: Met This Shift

## 2020-09-18 NOTE — DISCHARGE SUMMARY
[address]                               DISCHARGE SUMMARY    PATIENT NAME: Anabela Mayo                :        1995  MED REC NO:   [id number]                            ROOM:       0215  ACCOUNT NO:   [de-identified]                           ADMIT DATE: 2020. PROVIDER:     Wei Mccord MD                  DISCHARGE DATE:    ADDENDUM    The patient is doing well. Voiding well. Minimal lochia. Pain is  under adequate control. Wishes to go home today. Afebrile. Heart rate  68-80, blood pressure 112-110 over 79-68, O2 sat 97% on room air. Lungs:  Clear to auscultation. CV:  Regular rate, rhythm. Abdomen:   Obese, soft, nondistended, appropriately tender. No rebound or  guarding. Normal bowel sounds are present. Fundus is firm and two  fingerbreadths below the umbilicus. Perineum dry and intact. Incision  dry and intact. ASSESSMENT:  The patient is doing well, postop day #2, stable. PLAN:  Home with fever, bleeding, emboli, lifting, climbing, driving  precautions. She is to follow up at the office in 6 weeks. She  indicates understanding of all instructions.         Zeinab Diaz MD    D: 2020 9:23:58       T: 2020 9:34:40     [id number]  Job#: [id number]     Doc#: [id number]    CC:

## 2020-09-21 ENCOUNTER — HOSPITAL ENCOUNTER (EMERGENCY)
Age: 25
Discharge: HOME | End: 2020-09-21
Attending: EMERGENCY MEDICINE
Payer: COMMERCIAL

## 2020-09-21 VITALS
HEART RATE: 92 BPM | WEIGHT: 212 LBS | RESPIRATION RATE: 18 BRPM | DIASTOLIC BLOOD PRESSURE: 76 MMHG | BODY MASS INDEX: 33.27 KG/M2 | SYSTOLIC BLOOD PRESSURE: 112 MMHG | HEIGHT: 67 IN | OXYGEN SATURATION: 98 % | TEMPERATURE: 98 F

## 2020-09-21 LAB
ANION GAP SERPL CALCULATED.3IONS-SCNC: 12 MMOL/L (ref 7–16)
ANISOCYTOSIS: ABNORMAL
BACTERIA: ABNORMAL /HPF
BASOPHILS ABSOLUTE: 0 E9/L (ref 0–0.2)
BASOPHILS RELATIVE PERCENT: 0.4 % (ref 0–2)
BILIRUBIN URINE: NEGATIVE
BLOOD, URINE: ABNORMAL
BUN BLDV-MCNC: 10 MG/DL (ref 6–20)
BURR CELLS: ABNORMAL
CALCIUM SERPL-MCNC: 8.6 MG/DL (ref 8.6–10.2)
CHLORIDE BLD-SCNC: 105 MMOL/L (ref 98–107)
CLARITY: ABNORMAL
CO2: 23 MMOL/L (ref 22–29)
COLOR: ABNORMAL
CREAT SERPL-MCNC: 0.5 MG/DL (ref 0.5–1)
EOSINOPHILS ABSOLUTE: 0.18 E9/L (ref 0.05–0.5)
EOSINOPHILS RELATIVE PERCENT: 3.5 % (ref 0–6)
EPITHELIAL CELLS, UA: ABNORMAL /HPF
GFR AFRICAN AMERICAN: >60
GFR NON-AFRICAN AMERICAN: >60 ML/MIN/1.73
GLUCOSE BLD-MCNC: 95 MG/DL (ref 74–99)
GLUCOSE URINE: NEGATIVE MG/DL
HCT VFR BLD CALC: 30.4 % (ref 34–48)
HEMOGLOBIN: 8.7 G/DL (ref 11.5–15.5)
HYPOCHROMIA: ABNORMAL
KETONES, URINE: NEGATIVE MG/DL
LEUKOCYTE ESTERASE, URINE: ABNORMAL
LYMPHOCYTES ABSOLUTE: 0.77 E9/L (ref 1.5–4)
LYMPHOCYTES RELATIVE PERCENT: 15 % (ref 20–42)
MCH RBC QN AUTO: 25.2 PG (ref 26–35)
MCHC RBC AUTO-ENTMCNC: 28.6 % (ref 32–34.5)
MCV RBC AUTO: 88.1 FL (ref 80–99.9)
MONOCYTES ABSOLUTE: 0.2 E9/L (ref 0.1–0.95)
MONOCYTES RELATIVE PERCENT: 3.5 % (ref 2–12)
NEUTROPHILS ABSOLUTE: 3.98 E9/L (ref 1.8–7.3)
NEUTROPHILS RELATIVE PERCENT: 77.9 % (ref 43–80)
NITRITE, URINE: NEGATIVE
NUCLEATED RED BLOOD CELLS: 1.8 /100 WBC
OVALOCYTES: ABNORMAL
PDW BLD-RTO: ABNORMAL FL (ref 11.5–15)
PH UA: 6.5 (ref 5–9)
PLATELET # BLD: 218 E9/L (ref 130–450)
PMV BLD AUTO: 10.1 FL (ref 7–12)
POIKILOCYTES: ABNORMAL
POLYCHROMASIA: ABNORMAL
POTASSIUM REFLEX MAGNESIUM: 3.7 MMOL/L (ref 3.5–5)
PROTEIN UA: ABNORMAL MG/DL
RBC # BLD: 3.45 E12/L (ref 3.5–5.5)
RBC UA: >20 /HPF (ref 0–2)
SODIUM BLD-SCNC: 140 MMOL/L (ref 132–146)
SPECIFIC GRAVITY UA: 1.02 (ref 1–1.03)
UROBILINOGEN, URINE: 0.2 E.U./DL
WBC # BLD: 5.1 E9/L (ref 4.5–11.5)
WBC UA: ABNORMAL /HPF (ref 0–5)

## 2020-09-21 PROCEDURE — 85025 COMPLETE CBC W/AUTO DIFF WBC: CPT

## 2020-09-21 PROCEDURE — 36415 COLL VENOUS BLD VENIPUNCTURE: CPT

## 2020-09-21 PROCEDURE — 81001 URINALYSIS AUTO W/SCOPE: CPT

## 2020-09-21 PROCEDURE — 80048 BASIC METABOLIC PNL TOTAL CA: CPT

## 2020-09-21 PROCEDURE — 99283 EMERGENCY DEPT VISIT LOW MDM: CPT

## 2020-09-21 PROCEDURE — 99284 EMERGENCY DEPT VISIT MOD MDM: CPT

## 2020-09-21 ASSESSMENT — ENCOUNTER SYMPTOMS
WHEEZING: 0
COUGH: 0
ABDOMINAL DISTENTION: 0
ABDOMINAL PAIN: 1
SINUS PRESSURE: 0
VOMITING: 0
DIARRHEA: 0
EYE PAIN: 0
EYE REDNESS: 0
EYE DISCHARGE: 0
SHORTNESS OF BREATH: 0
BACK PAIN: 0
SORE THROAT: 0
NAUSEA: 0

## 2020-09-21 ASSESSMENT — PAIN DESCRIPTION - LOCATION: LOCATION: ABDOMEN

## 2020-09-21 ASSESSMENT — PAIN DESCRIPTION - DESCRIPTORS: DESCRIPTORS: BURNING

## 2020-09-21 ASSESSMENT — PAIN DESCRIPTION - FREQUENCY: FREQUENCY: CONTINUOUS

## 2020-09-21 ASSESSMENT — PAIN DESCRIPTION - PAIN TYPE: TYPE: ACUTE PAIN;SURGICAL PAIN

## 2020-09-21 ASSESSMENT — PAIN DESCRIPTION - ORIENTATION: ORIENTATION: RIGHT

## 2020-09-21 NOTE — ED PROVIDER NOTES
Bebe Keller is a  22 y.o. female with recent deliver 2020 ([facility]esarian) who presents for evaluation of her wound, beginning prior to arrival.  The complaint has been intermittent, moderate in severity, and worsened by movement and palpation. The patient states that she has been having some post-op discomfort for which she has been taking percocet. This morning around 0600 the patient started to feel a burning discomfort. She states that this has happened in the past, but it usually subsides after she sits up and doesn't move. This morning the pain continued requiring her to take additional pain medication. The patient called her OB-GYN who told her to proceed to the ER for evaluation. Currently the patient states that the pain has improved some and is a 2 out of 10 just laying there. She denies any fevers at home. The history is provided by the patient and medical records. Review of Systems   Constitutional: Negative for chills and fever. HENT: Negative for ear pain, sinus pressure and sore throat. Eyes: Negative for pain, discharge and redness. Respiratory: Negative for cough, shortness of breath and wheezing. Cardiovascular: Negative for chest pain. Gastrointestinal: Positive for abdominal pain (Recent Cesarian section). Negative for abdominal distention, diarrhea, nausea and vomiting. Genitourinary: Negative for dysuria and frequency. Musculoskeletal: Negative for back pain and myalgias. Skin: Positive for wound ( Surgical incision). Negative for rash. Neurological: Negative for dizziness, weakness, light-headedness and headaches. Hematological: Negative for adenopathy. All other systems reviewed and are negative. Physical Exam  Vitals signs and nursing note reviewed. Constitutional:       Appearance: She is well-developed and normal weight. HENT:      Head: Normocephalic and atraumatic.       Right Ear: External ear normal.      Left Ear: External ear normal.   Eyes:      General:         Right eye: No discharge. Left eye: No discharge. Extraocular Movements: Extraocular movements intact. Conjunctiva/sclera: Conjunctivae normal.   Neck:      Musculoskeletal: Normal range of motion and neck supple. Cardiovascular:      Rate and Rhythm: Normal rate and regular rhythm. Heart sounds: Normal heart sounds. No murmur. Pulmonary:      Effort: Pulmonary effort is normal. No respiratory distress. Breath sounds: Normal breath sounds. No stridor. No wheezing or rhonchi. Abdominal:      General: Bowel sounds are normal. There is no distension. Palpations: Abdomen is soft. Tenderness: There is abdominal tenderness in the right lower quadrant. There is no guarding or rebound. Comments: Well healing supra-pubic incision. No evidence of erythema or drainage. Musculoskeletal: Normal range of motion. General: No swelling, tenderness or deformity. Skin:     General: Skin is warm. Coloration: Skin is not jaundiced or pale. Findings: No bruising. Neurological:      General: No focal deficit present. Mental Status: She is alert and oriented to person, place, and time. Cranial Nerves: No cranial nerve deficit. Coordination: Coordination normal.          Procedures     JAMISON Hdz presents to the ED for evaluation of her  wound. Differential diagnoses included but not limited to infection, post-op pain. Workup in the ED revealed no leukocytosis on CBC, she was afebrile not tachycardic at the department. Surgical wound appears to be healing well with no discharge. She does have some tenderness, but she states she has been having pain since the surgery. Pain did improve with her pain medication at home. Patient continues to be non-toxic on re-evaluation. Findings were discussed with the patient and reasons to immediately return to the ED were articulated to them. They will follow-up with their OB-GYN.  --------------------------------------------- PAST HISTORY ---------------------------------------------  Past Medical History:  has a past medical history of Depression, Endometriosis of pelvis, and Prolonged emergence from general anesthesia. Past Surgical History:  has a past surgical history that includes Tonsillectomy; Myringotomy Tympanostomy Tube Placement; Middle ear surgery;  section; laparoscopy (2017); pr lap,lysis of adhesions (N/A, 2018); Dilation and curettage of uterus (N/A, 2019); and  section (N/A, 2020). Social History:  reports that she has never smoked. She has never used smokeless tobacco. She reports that she does not drink alcohol or use drugs. Family History: family history is not on file. The patients home medications have been reviewed.     Allergies: Pcn [penicillins]    -------------------------------------------------- RESULTS -------------------------------------------------  Labs:  Results for orders placed or performed during the hospital encounter of 20   CBC Auto Differential   Result Value Ref Range    WBC 5.1 4.5 - 11.5 E9/L    RBC 3.45 (L) 3.50 - 5.50 E12/L    Hemoglobin 8.7 (L) 11.5 - 15.5 g/dL    Hematocrit 30.4 (L) 34.0 - 48.0 %    MCV 88.1 80.0 - 99.9 fL    MCH 25.2 (L) 26.0 - 35.0 pg    MCHC 28.6 (L) 32.0 - 34.5 %    RDW NOT CALC 11.5 - 15.0 fL    Platelets 782 737 - 863 E9/L    MPV 10.1 7.0 - 12.0 fL    Neutrophils % 77.9 43.0 - 80.0 %    Lymphocytes % 15.0 (L) 20.0 - 42.0 %    Monocytes % 3.5 2.0 - 12.0 %    Eosinophils % 3.5 0.0 - 6.0 %    Basophils % 0.4 0.0 - 2.0 %    Neutrophils Absolute 3.98 1.80 - 7.30 E9/L    Lymphocytes Absolute 0.77 (L) 1.50 - 4.00 E9/L    Monocytes Absolute 0.20 0.10 - 0.95 E9/L    Eosinophils Absolute 0.18 0.05 - 0.50 E9/L    Basophils Absolute 0.00 0.00 - 0.20 E9/L    nRBC 1.8 /100 WBC    Anisocytosis 2+     Polychromasia 1+     Hypochromia 2+ Poikilocytes 2+     Sima Cells 1+     Ovalocytes 2+    Basic Metabolic Panel w/ Reflex to MG   Result Value Ref Range    Sodium 140 132 - 146 mmol/L    Potassium reflex Magnesium 3.7 3.5 - 5.0 mmol/L    Chloride 105 98 - 107 mmol/L    CO2 23 22 - 29 mmol/L    Anion Gap 12 7 - 16 mmol/L    Glucose 95 74 - 99 mg/dL    BUN 10 6 - 20 mg/dL    CREATININE 0.5 0.5 - 1.0 mg/dL    GFR Non-African American >60 >=60 mL/min/1.73    GFR African American >60     Calcium 8.6 8.6 - 10.2 mg/dL   Urinalysis, reflex to microscopic   Result Value Ref Range    Color, UA RED (A) Straw/Yellow    Clarity, UA CLOUDY (A) Clear    Glucose, Ur Negative Negative mg/dL    Bilirubin Urine Negative Negative    Ketones, Urine Negative Negative mg/dL    Specific Gravity, UA 1.020 1.005 - 1.030    Blood, Urine LARGE (A) Negative    pH, UA 6.5 5.0 - 9.0    Protein, UA TRACE Negative mg/dL    Urobilinogen, Urine 0.2 <2.0 E.U./dL    Nitrite, Urine Negative Negative    Leukocyte Esterase, Urine MODERATE (A) Negative   Microscopic Urinalysis   Result Value Ref Range    WBC, UA 2-5 0 - 5 /HPF    RBC, UA >20 0 - 2 /HPF    Epithelial Cells, UA FEW /HPF    Bacteria, UA RARE (A) None Seen /HPF       Radiology:  No orders to display       ------------------------- NURSING NOTES AND VITALS REVIEWED ---------------------------  Date / Time Roomed:  9/21/2020  7:09 AM  ED Bed Assignment:  14/14    The nursing notes within the ED encounter and vital signs as below have been reviewed.    /76   Pulse 92   Temp 98 °F (36.7 °C) (Temporal)   Resp 18   Ht 5' 7\" (1.702 m)   Wt 212 lb (96.2 kg)   LMP 12/01/2019 (Approximate)   SpO2 98%   BMI 33.20 kg/m²   Oxygen Saturation Interpretation: Normal      ------------------------------------------ PROGRESS NOTES ------------------------------------------  7:36 AM EDT  I have spoken with the patient and discussed todays results, in addition to providing specific details for the plan of care and counseling regarding the diagnosis and prognosis. Their questions are answered at this time and they are agreeable with the plan. I discussed at length with them reasons for immediate return here for re evaluation. They will followup with their OB/GYN by calling their office tomorrow. --------------------------------- ADDITIONAL PROVIDER NOTES ---------------------------------  At this time the patient is without objective evidence of an acute process requiring hospitalization or inpatient management. They have remained hemodynamically stable throughout their entire ED visit and are stable for discharge with outpatient follow-up. The plan has been discussed in detail and they are aware of the specific conditions for emergent return, as well as the importance of follow-up. Discharge Medication List as of 9/21/2020 10:46 AM          Diagnosis:  1. Post-op pain        Disposition:  Patient's disposition: Discharge to home  Patient's condition is stable. ATTENDING PROVIDER ATTESTATION:     Nichelle Saavedra presented to the emergency department for evaluation of Wound Check (C section last week. Incision site red, burning.)    I have reviewed and discussed the case, including pertinent history (medical, surgical, family and social) and exam findings with the Resident and the Nurse assigned to Nichelle Saavedra. I have personally performed and/or participated in the history, exam, medical decision making, and procedures and agree with all pertinent clinical information. On exam patient's surgical site appears to be unremarkable. There is no signs of dehiscence. There is no erythema around this site. There is no discharge. Appears to be healing well. Mild tenderness to palpation around the site. No fluctuant masses appreciated. The remainder of the abdomen is soft and nontender. Without rebound or guarding. Heart regular rate and rhythm.       I have reviewed my findings and recommendations with Joan Cabrera Lorraine and members of family present at the time of disposition. MDM: Supportive care, will obtain appropriate labs and imaging to assess patient's Wound Check (C section last week. Incision site red, burning.)       My findings/plan: The encounter diagnosis was Post-op pain.   Discharge Medication List as of 9/21/2020 10:46 AM        Marcos Schirmer, [address]., DO  Resident  09/22/20 C/ [address], DO  09/23/20 1112

## 2020-09-24 NOTE — PROGRESS NOTES
CLINICAL PHARMACY NOTE: MEDS TO [address] [place] [address] Drive Select Patient?: No  Total # of Prescriptions Filled: 1   The following medications were delivered to the patient:  · Oxycodone 5-325 mg  Total # of Interventions Completed: 2  Time Spent (min): 30    Additional Documentation:

## 2020-09-28 ENCOUNTER — HOSPITAL ENCOUNTER (OUTPATIENT)
Dept: GENERAL RADIOLOGY | Age: 25
Discharge: HOME | End: 2020-09-30
Payer: COMMERCIAL

## 2020-09-28 ENCOUNTER — HOSPITAL ENCOUNTER (OUTPATIENT)
Age: 25
Discharge: HOME | End: 2020-09-30
Payer: COMMERCIAL

## 2020-09-28 PROCEDURE — 73552 X-RAY EXAM OF FEMUR 2/>: CPT

## 2020-09-28 PROCEDURE — 72100 X-RAY EXAM L-S SPINE 2/3 VWS: CPT

## 2020-09-28 PROCEDURE — 73502 X-RAY EXAM HIP UNI 2-3 VIEWS: CPT

## 2020-10-07 ENCOUNTER — HOSPITAL ENCOUNTER (EMERGENCY)
Age: 25
Discharge: HOME | End: 2020-10-07
Attending: EMERGENCY MEDICINE
Payer: COMMERCIAL

## 2020-10-07 VITALS
TEMPERATURE: 97.9 F | WEIGHT: 190 LBS | BODY MASS INDEX: 29.82 KG/M2 | HEIGHT: 67 IN | HEART RATE: 91 BPM | OXYGEN SATURATION: 98 % | SYSTOLIC BLOOD PRESSURE: 105 MMHG | RESPIRATION RATE: 18 BRPM | DIASTOLIC BLOOD PRESSURE: 62 MMHG

## 2020-10-07 PROBLEM — T14.8XXA WOUND INFECTION: Status: ACTIVE | Noted: 2020-10-07

## 2020-10-07 PROBLEM — L08.9 WOUND INFECTION: Status: ACTIVE | Noted: 2020-10-07

## 2020-10-07 PROCEDURE — 99283 EMERGENCY DEPT VISIT LOW MDM: CPT

## 2020-10-07 PROCEDURE — 99282 EMERGENCY DEPT VISIT SF MDM: CPT

## 2020-10-07 ASSESSMENT — PAIN DESCRIPTION - PAIN TYPE: TYPE: ACUTE PAIN

## 2020-10-07 ASSESSMENT — ENCOUNTER SYMPTOMS
VOMITING: 0
EYE DISCHARGE: 0
ABDOMINAL DISTENTION: 0
COUGH: 0
SINUS PRESSURE: 0
WHEEZING: 0
SHORTNESS OF BREATH: 0
NAUSEA: 0
SORE THROAT: 0
DIARRHEA: 0
EYE PAIN: 0
BACK PAIN: 0
EYE REDNESS: 0

## 2020-10-07 ASSESSMENT — PAIN DESCRIPTION - LOCATION: LOCATION: OTHER (COMMENT)

## 2020-10-07 ASSESSMENT — PAIN SCALES - GENERAL: PAINLEVEL_OUTOF10: 6

## 2020-10-07 ASSESSMENT — PAIN - FUNCTIONAL ASSESSMENT: PAIN_FUNCTIONAL_ASSESSMENT: PREVENTS OR INTERFERES WITH MANY ACTIVE NOT PASSIVE ACTIVITIES

## 2020-10-07 ASSESSMENT — PAIN DESCRIPTION - DESCRIPTORS: DESCRIPTORS: BURNING

## 2020-10-07 ASSESSMENT — PAIN DESCRIPTION - PROGRESSION: CLINICAL_PROGRESSION: NOT CHANGED

## 2020-10-07 ASSESSMENT — PAIN DESCRIPTION - ONSET: ONSET: ON-GOING

## 2020-10-07 ASSESSMENT — PAIN DESCRIPTION - FREQUENCY: FREQUENCY: CONTINUOUS

## 2020-10-08 NOTE — ED PROVIDER NOTES
Discharge from c section 3 weeks ago, mild discharge serosang. Placed on antibiotics by gynecologist started today. No fevers. No vomiting no abdominal pain no vaginal bleeding no vag discharge appetite good urinating ok bm ok not breast feeding    The history is provided by the patient. Review of Systems   Constitutional: Negative for chills and fever. HENT: Negative for ear pain, sinus pressure and sore throat. Eyes: Negative for pain, discharge and redness. Respiratory: Negative for cough, shortness of breath and wheezing. Cardiovascular: Negative for chest pain. Gastrointestinal: Negative for abdominal distention, diarrhea, nausea and vomiting. Genitourinary: Negative for dysuria and frequency. Musculoskeletal: Negative for arthralgias and back pain. Skin: Positive for wound. Negative for rash. Neurological: Negative for weakness and headaches. Hematological: Negative for adenopathy. All other systems reviewed and are negative. Physical Exam  Vitals signs and nursing note reviewed. Constitutional:       Appearance: She is well-developed. HENT:      Head: Normocephalic and atraumatic. Eyes:      Pupils: Pupils are equal, round, and reactive to light. Neck:      Musculoskeletal: Normal range of motion and neck supple. Cardiovascular:      Rate and Rhythm: Normal rate and regular rhythm. Heart sounds: Normal heart sounds. No murmur. Pulmonary:      Effort: Pulmonary effort is normal. No respiratory distress. Breath sounds: Normal breath sounds. No wheezing or rales. Abdominal:      General: Bowel sounds are normal.      Palpations: Abdomen is soft. Tenderness: There is no abdominal tenderness. There is no guarding or rebound. Skin:     General: Skin is warm and dry. Comments: Minimal mild erythema.  No discharge abdomen benign  No bleeding very superficial no wound dehiscense   Neurological:      Mental Status: She is alert and oriented to person, place, and time. Procedures     [facility]         --------------------------------------------- PAST HISTORY ---------------------------------------------  Past Medical History:  has a past medical history of Depression, Endometriosis of pelvis, Prolonged emergence from general anesthesia, and Wound infection. Past Surgical History:  has a past surgical history that includes Tonsillectomy; Myringotomy Tympanostomy Tube Placement; Middle ear surgery;  section; laparoscopy (2017); pr lap,lysis of adhesions (N/A, 2018); Dilation and curettage of uterus (N/A, 2019); and  section (N/A, 2020). Social History:  reports that she has never smoked. She has never used smokeless tobacco. She reports that she does not drink alcohol or use drugs. Family History: family history is not on file. The patients home medications have been reviewed. Allergies: Pcn [penicillins]    -------------------------------------------------- RESULTS -------------------------------------------------  No results found for this visit on 10/07/20. No orders to display       ------------------------- NURSING NOTES AND VITALS REVIEWED ---------------------------   The nursing notes within the ED encounter and vital signs as below have been reviewed. /62   Pulse 91   Temp 97.9 °F (36.6 °C) (Oral)   Resp 18   Ht 5' 7\" (1.702 m)   Wt 190 lb (86.2 kg)   LMP 2019 (Approximate)   SpO2 98%   BMI 29.76 kg/m²   Oxygen Saturation Interpretation: Normal      ------------------------------------------ PROGRESS NOTES ------------------------------------------   I have spoken with the patient and discussed todays results, in addition to providing specific details for the plan of care and counseling regarding the diagnosis and prognosis.   Their questions are answered at this time and they are agreeable with the plan.      --------------------------------- ADDITIONAL PROVIDER NOTES

## 2020-10-16 ENCOUNTER — HOSPITAL ENCOUNTER (EMERGENCY)
Age: 25
Discharge: HOME | End: 2020-10-16
Attending: EMERGENCY MEDICINE
Payer: COMMERCIAL

## 2020-10-16 VITALS
DIASTOLIC BLOOD PRESSURE: 54 MMHG | SYSTOLIC BLOOD PRESSURE: 114 MMHG | OXYGEN SATURATION: 99 % | BODY MASS INDEX: 29.82 KG/M2 | HEIGHT: 67 IN | HEART RATE: 91 BPM | TEMPERATURE: 97.9 F | WEIGHT: 190 LBS | RESPIRATION RATE: 18 BRPM

## 2020-10-16 PROCEDURE — 99283 EMERGENCY DEPT VISIT LOW MDM: CPT

## 2020-10-16 RX ORDER — NYSTATIN 100000 [USP'U]/G
POWDER TOPICAL
Qty: 1 BOTTLE | Refills: 0 | Status: SHIPPED | OUTPATIENT
Start: 2020-10-16 | End: 2020-12-27

## 2020-10-16 ASSESSMENT — ENCOUNTER SYMPTOMS
ABDOMINAL PAIN: 0
WHEEZING: 0
VOMITING: 0
RHINORRHEA: 0
SINUS PAIN: 0
NAUSEA: 0
SHORTNESS OF BREATH: 0
DIARRHEA: 0
COUGH: 0
CONSTIPATION: 0
SINUS PRESSURE: 0
SORE THROAT: 0
BACK PAIN: 0

## 2020-10-16 ASSESSMENT — PAIN DESCRIPTION - PAIN TYPE: TYPE: ACUTE PAIN

## 2020-10-16 ASSESSMENT — PAIN DESCRIPTION - LOCATION: LOCATION: ABDOMEN

## 2020-10-16 NOTE — ED PROVIDER NOTES
are equal, round, and reactive to light. Neck:      Musculoskeletal: Normal range of motion. No neck rigidity. Cardiovascular:      Rate and Rhythm: Normal rate. Heart sounds: No murmur. No gallop. Pulmonary:      Effort: No respiratory distress. Breath sounds: Normal breath sounds. No wheezing. Abdominal:      General: There is no distension. Palpations: Abdomen is soft. Tenderness: There is no abdominal tenderness. Musculoskeletal: Normal range of motion. General: No swelling or deformity. Lymphadenopathy:      Cervical: No cervical adenopathy. Skin:     General: Skin is warm. Capillary Refill: Capillary refill takes less than 2 seconds. Coloration: Skin is not jaundiced. Findings: No bruising. Comments: Patient does have evidence of a yeast infection from her  scar. There is some wound dehiscence that has been present, but no active drainage. Patient states that the dehiscence has been present for a couple of weeks. There is some erythema centrally of the scar, but no warmth. No fluctuance noted. Neurological:      Mental Status: She is alert and oriented to person, place, and time. Cranial Nerves: No cranial nerve deficit. Motor: No weakness. Psychiatric:         Mood and Affect: Mood normal.         Thought Content: Thought content normal.          Procedures     MDM  Number of Diagnoses or Management Options  Yeast infection:   Diagnosis management comments: Patient is a 68-year-old female who presents with a chief complaint of  wound. Patient states that she had a  done 1 month ago and she developed some wound dehiscence and infection. She was on antibiotics. Patient does not remember what antibiotics that she was on. She did notice some white discharge and wanted it to be evaluated. She does follow-up with her gynecologist in the next couple weeks, she was advised to follow-up with them sooner. On my examination she does have some white discharge that is consistent with a yeast infection. There is a wound dehiscence that has been present, but no active drainage from the wound. There is no abdominal tenderness to palpation. There is no fluctuance noted. Patient is afebrile. Patient was given nystatin powder to place on the incision. Patient will follow-up with OB/GYN. No further questions.                --------------------------------------------- PAST HISTORY ---------------------------------------------  Past Medical History:  has a past medical history of Depression, Endometriosis of pelvis, Prolonged emergence from general anesthesia, and Wound infection. Past Surgical History:  has a past surgical history that includes Tonsillectomy; Myringotomy Tympanostomy Tube Placement; Middle ear surgery;  section; laparoscopy (2017); pr lap,lysis of adhesions (N/A, 2018); Dilation and curettage of uterus (N/A, 2019); and  section (N/A, 2020). Social History:  reports that she has never smoked. She has never used smokeless tobacco. She reports that she does not drink alcohol or use drugs. Family History: family history is not on file. The patients home medications have been reviewed. Allergies: Pcn [penicillins]    -------------------------------------------------- RESULTS -------------------------------------------------  Labs:  No results found for this visit on 10/16/20. Radiology:  No orders to display       ------------------------- NURSING NOTES AND VITALS REVIEWED ---------------------------  Date / Time Roomed:  10/16/2020  7:13 PM  ED Bed Assignment:      The nursing notes within the ED encounter and vital signs as below have been reviewed.    BP (!) 114/54   Pulse 91   Temp 97.9 °F (36.6 °C) (Oral)   Resp 18   Ht 5' 7\" (1.702 m)   Wt 190 lb (86.2 kg)   LMP 2019 (Approximate)   SpO2 99%   BMI 29.76 kg/m²   Oxygen Saturation Interpretation: Normal      ------------------------------------------ PROGRESS NOTES ------------------------------------------  I have spoken with the patient and discussed todays results, in addition to providing specific details for the plan of care and counseling regarding the diagnosis and prognosis. Their questions are answered at this time and they are agreeable with the plan. I discussed at length with them reasons for immediate return here for re evaluation. They will followup with primary care by calling their office tomorrow. --------------------------------- ADDITIONAL PROVIDER NOTES ---------------------------------  At this time the patient is without objective evidence of an acute process requiring hospitalization or inpatient management. They have remained hemodynamically stable throughout their entire ED visit and are stable for discharge with outpatient follow-up. The plan has been discussed in detail and they are aware of the specific conditions for emergent return, as well as the importance of follow-up. Discharge Medication List as of 10/16/2020  7:51 PM      START taking these medications    Details   nystatin (MYCOSTATIN) [id number] UNIT/GM powder Apply topically 4 times daily. , Disp-1 Bottle,R-0, Print             Diagnosis:  1. Yeast infection        Disposition:  Patient's disposition: Discharge to home  Patient's condition is stable.               Janae Carter DO  10/16/20 2023

## 2020-12-27 ENCOUNTER — HOSPITAL ENCOUNTER (EMERGENCY)
Age: 25
Discharge: HOME | End: 2020-12-27
Attending: EMERGENCY MEDICINE
Payer: COMMERCIAL

## 2020-12-27 ENCOUNTER — APPOINTMENT (OUTPATIENT)
Dept: GENERAL RADIOLOGY | Age: 25
End: 2020-12-27
Payer: COMMERCIAL

## 2020-12-27 VITALS
TEMPERATURE: 98.5 F | HEIGHT: 67 IN | RESPIRATION RATE: 18 BRPM | OXYGEN SATURATION: 99 % | DIASTOLIC BLOOD PRESSURE: 68 MMHG | HEART RATE: 97 BPM | SYSTOLIC BLOOD PRESSURE: 124 MMHG | WEIGHT: 180 LBS | BODY MASS INDEX: 28.25 KG/M2

## 2020-12-27 LAB
HCG, URINE, POC: NEGATIVE
Lab: NORMAL
NEGATIVE QC PASS/FAIL: NORMAL
POSITIVE QC PASS/FAIL: NORMAL

## 2020-12-27 PROCEDURE — 73060 X-RAY EXAM OF HUMERUS: CPT

## 2020-12-27 PROCEDURE — 99282 EMERGENCY DEPT VISIT SF MDM: CPT

## 2020-12-27 ASSESSMENT — ENCOUNTER SYMPTOMS
WHEEZING: 0
VOMITING: 0
TROUBLE SWALLOWING: 0
SORE THROAT: 0
SHORTNESS OF BREATH: 0
DIARRHEA: 0
CHOKING: 0
BACK PAIN: 0
COUGH: 0
CHEST TIGHTNESS: 0
SINUS PRESSURE: 0
NAUSEA: 0
ABDOMINAL PAIN: 0
VOICE CHANGE: 0

## 2020-12-27 ASSESSMENT — PAIN DESCRIPTION - LOCATION: LOCATION: ARM

## 2020-12-27 ASSESSMENT — PAIN DESCRIPTION - PAIN TYPE: TYPE: ACUTE PAIN

## 2020-12-27 ASSESSMENT — PAIN SCALES - GENERAL: PAINLEVEL_OUTOF10: 7

## 2020-12-27 ASSESSMENT — PAIN DESCRIPTION - ORIENTATION: ORIENTATION: RIGHT
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
## 2021-01-14 ENCOUNTER — HOSPITAL ENCOUNTER (EMERGENCY)
Age: 26
Discharge: HOME | End: 2021-01-14
Payer: COMMERCIAL

## 2021-01-14 VITALS
HEART RATE: 104 BPM | RESPIRATION RATE: 20 BRPM | OXYGEN SATURATION: 98 % | WEIGHT: 205 LBS | HEIGHT: 67 IN | DIASTOLIC BLOOD PRESSURE: 75 MMHG | BODY MASS INDEX: 32.18 KG/M2 | SYSTOLIC BLOOD PRESSURE: 112 MMHG | TEMPERATURE: 98.4 F

## 2021-01-14 DIAGNOSIS — K64.5 THROMBOSED EXTERNAL HEMORRHOID: Primary | ICD-10-CM

## 2021-01-14 PROCEDURE — 99212 OFFICE O/P EST SF 10 MIN: CPT

## 2021-01-14 RX ORDER — METRONIDAZOLE 500 MG/1
500 TABLET ORAL 3 TIMES DAILY
Qty: 15 TABLET | Refills: 0 | Status: SHIPPED | OUTPATIENT
Start: 2021-01-14 | End: 2021-01-19

## 2021-01-14 RX ORDER — CIPROFLOXACIN 500 MG/1
500 TABLET, FILM COATED ORAL 2 TIMES DAILY
Qty: 10 TABLET | Refills: 0 | Status: SHIPPED | OUTPATIENT
Start: 2021-01-14 | End: 2021-01-19

## 2021-01-14 NOTE — ED PROVIDER NOTES
This is a 55-year-old female the presents to urgent care complaining of a very painful round raised area above her anus. She states she just noticed it yesterday. She denies any fevers or chills. No urinary symptoms no diarrhea or constipation. No abdominal pain. No blood in her urine or stool. She does have an 3month-old child. She is not breast-feeding. On first contact patient she is in no acute distress but she is uncomfortable because of the pain. Review of Systems   Constitutional:        Pertinent positives and negatives are stated within HPI, all other systems reviewed and are negative. Physical Exam  Vitals signs and nursing note reviewed. Exam conducted with a chaperone present. Constitutional:       Appearance: She is well-developed. HENT:      Head: Normocephalic and atraumatic. Right Ear: Hearing and external ear normal.      Left Ear: Hearing and external ear normal.      Nose: Nose normal.      Mouth/Throat:      Pharynx: Uvula midline. Eyes:      General: Lids are normal.      Conjunctiva/sclera: Conjunctivae normal.      Pupils: Pupils are equal, round, and reactive to light. Neck:      Musculoskeletal: Normal range of motion and neck supple. Cardiovascular:      Rate and Rhythm: Normal rate and regular rhythm. Heart sounds: Normal heart sounds. No murmur. Pulmonary:      Effort: Pulmonary effort is normal.      Breath sounds: Normal breath sounds. Abdominal:      General: Bowel sounds are normal.      Palpations: Abdomen is soft. Abdomen is not rigid. Tenderness: There is no abdominal tenderness. There is no guarding or rebound. Genitourinary:     Comments: Patient has a rounded thrombosed raised external hemorrhoid about 2 cm in diameter. No surrounding redness or swelling. Skin:     General: Skin is warm and dry. Findings: No abrasion or rash.    Neurological: Mental Status: She is alert and oriented to person, place, and time. GCS: GCS eye subscore is 4. GCS verbal subscore is 5. GCS motor subscore is 6. Cranial Nerves: No cranial nerve deficit. Sensory: No sensory deficit. Coordination: Coordination normal.      Gait: Gait normal.         Incision/Drainage    Date/Time: 1/14/2021 5:12 PM  Performed by: Adamaris Aguiar PA-C  Authorized by: Adamaris Aguiar PA-C     Consent:     Consent obtained:  Verbal    Consent given by:  Patient    Risks discussed:  Bleeding    Alternatives discussed:  No treatment  Location:     Type:  External thrombosed hemorrhoid    Size:  2 cm diameter    Location:  Anogenital    Anogenital location:  Perirectal  Pre-procedure details:     Skin preparation:  Betadine  Anesthesia (see MAR for exact dosages): Anesthesia method:  Local infiltration    Local anesthetic:  Lidocaine 1% w/o epi  Procedure details:     Incision types:  Cruciate    Incision depth:  Dermal    Scalpel blade:  11    Wound management:  Probed and deloculated    Drainage:  Bloody    Drainage amount: Moderate    Wound treatment:  Wound left open        MDM  Number of Diagnoses or Management Options  Thrombosed external hemorrhoid  Diagnosis management comments: Patient is very uncomfortable. We talked about options 1 option was to make an incision into the thrombosed hemorrhoid. Patient felt better after the procedure. Bleeding controlled. She will take Tylenol and ibuprofen I will place her on a couple days worth of prophylactic antibiotics. She does have an appointment with her gynecologist I also wrote a referral to surgery.   I did tell her that this hemorrhoid may need more definitive treatment to get rid of it.           --------------------------------------------- PAST HISTORY --------------------------------------------- Past Medical History:  has a past medical history of Depression, Endometriosis of pelvis, Prolonged emergence from general anesthesia, and Wound infection. Past Surgical History:  has a past surgical history that includes Tonsillectomy; Myringotomy Tympanostomy Tube Placement; Middle ear surgery;  section; laparoscopy (2017); pr lap,lysis of adhesions (N/A, 2018); Dilation and curettage of uterus (N/A, 2019); and  section (N/A, 2020). Social History:  reports that she has never smoked. She has never used smokeless tobacco. She reports that she does not drink alcohol or use drugs. Family History: family history is not on file. The patients home medications have been reviewed. Allergies: Pcn [penicillins]    -------------------------------------------------- RESULTS -------------------------------------------------  No results found for this visit on 21. No orders to display       ------------------------- NURSING NOTES AND VITALS REVIEWED ---------------------------   The nursing notes within the ED encounter and vital signs as below have been reviewed. /75   Pulse 104   Temp 98.4 °F (36.9 °C) (Infrared)   Resp 20   Ht 5' 7\" (1.702 m)   Wt 205 lb (93 kg)   LMP 2020   SpO2 98%   BMI 32.11 kg/m²   Oxygen Saturation Interpretation: Normal      ------------------------------------------ PROGRESS NOTES ------------------------------------------   I have spoken with the patient and discussed todays results, in addition to providing specific details for the plan of care and counseling regarding the diagnosis and prognosis. Their questions are answered at this time and they are agreeable with the plan.      --------------------------------- ADDITIONAL PROVIDER NOTES ---------------------------------     This patient is stable for discharge. I have shared the specific conditions for return, as well as the importance of follow-up. * NOTE: This report was transcribed using voice recognition software. Every effort was made to ensure accuracy; however, inadvertent computerized transcription errors may be present.    --------------------------------- IMPRESSION AND DISPOSITION ---------------------------------    IMPRESSION  1.  Thrombosed external hemorrhoid        DISPOSITION  Disposition: Discharge to home  Patient condition is good       Jordan Lara PA-C  01/14/21 1715

## 2021-01-25 ENCOUNTER — TELEPHONE (OUTPATIENT)
Dept: SURGERY | Age: 26
End: 2021-01-25

## 2021-01-25 NOTE — TELEPHONE ENCOUNTER
Patient scheduled for consult with Dr. Heide Ivan 2.2.21.   Electronically signed by Agatha Kraus on 1/25/21 at 3:37 PM EST

## 2021-06-04 ENCOUNTER — HOSPITAL ENCOUNTER (EMERGENCY)
Age: 26
Discharge: HOME | End: 2021-06-04
Attending: EMERGENCY MEDICINE
Payer: COMMERCIAL

## 2021-06-04 VITALS
HEART RATE: 100 BPM | OXYGEN SATURATION: 97 % | TEMPERATURE: 97.3 F | RESPIRATION RATE: 18 BRPM | SYSTOLIC BLOOD PRESSURE: 116 MMHG | DIASTOLIC BLOOD PRESSURE: 67 MMHG

## 2021-06-04 DIAGNOSIS — B34.9 ACUTE VIRAL SYNDROME: Primary | ICD-10-CM

## 2021-06-04 LAB — SARS-COV-2, NAAT: NOT DETECTED

## 2021-06-04 PROCEDURE — 87635 SARS-COV-2 COVID-19 AMP PRB: CPT

## 2021-06-04 PROCEDURE — 99283 EMERGENCY DEPT VISIT LOW MDM: CPT

## 2021-06-04 ASSESSMENT — ENCOUNTER SYMPTOMS
SINUS PRESSURE: 1
WHEEZING: 0
SINUS PAIN: 0
NAUSEA: 0
BACK PAIN: 0
ABDOMINAL PAIN: 0
DIARRHEA: 0
SORE THROAT: 1
CHEST TIGHTNESS: 0
VOMITING: 0
COUGH: 0
SHORTNESS OF BREATH: 0
RHINORRHEA: 1

## 2021-06-04 NOTE — ED PROVIDER NOTES
The history is provided by the patient. URI  Presenting symptoms: congestion, fatigue, rhinorrhea and sore throat    Presenting symptoms: no cough and no fever    Severity:  Moderate  Onset quality:  Gradual  Duration:  2 days  Timing:  Constant  Progression:  Worsening  Chronicity:  New  Relieved by:  None tried  Worsened by:  Nothing  Ineffective treatments:  None tried  Associated symptoms: myalgias    Associated symptoms: no arthralgias, no headaches, no neck pain, no sinus pain and no wheezing    Risk factors: sick contacts    Risk factors: no chronic respiratory disease and no diabetes mellitus        Review of Systems   Constitutional: Positive for activity change, chills and fatigue. Negative for appetite change, diaphoresis and fever. HENT: Positive for congestion, rhinorrhea, sinus pressure and sore throat. Negative for sinus pain. Respiratory: Negative for cough, chest tightness, shortness of breath and wheezing. Gastrointestinal: Negative for abdominal pain, diarrhea, nausea and vomiting. Genitourinary: Negative for flank pain. Musculoskeletal: Positive for myalgias. Negative for arthralgias, back pain and neck pain. Skin: Negative. Neurological: Negative for light-headedness and headaches. Physical Exam  Vitals and nursing note reviewed. Constitutional:       General: She is not in acute distress. Appearance: Normal appearance. She is well-developed and normal weight. She is not ill-appearing or toxic-appearing. HENT:      Head: Normocephalic and atraumatic. Right Ear: Tympanic membrane, ear canal and external ear normal.      Left Ear: Tympanic membrane, ear canal and external ear normal.      Nose: Congestion present. Mouth/Throat:      Mouth: Mucous membranes are moist.      Pharynx: Oropharynx is clear. Eyes:      Extraocular Movements: Extraocular movements intact.       Conjunctiva/sclera: Conjunctivae normal.      Pupils: Pupils are equal, round, and reactive to light. Cardiovascular:      Rate and Rhythm: Normal rate and regular rhythm. Pulses: Normal pulses. Heart sounds: Normal heart sounds. No murmur heard. Pulmonary:      Effort: Pulmonary effort is normal. No respiratory distress. Breath sounds: Normal breath sounds. No wheezing or rales. Abdominal:      General: Bowel sounds are normal.      Palpations: Abdomen is soft. Tenderness: There is no abdominal tenderness. There is no guarding or rebound. Musculoskeletal:         General: No tenderness. Normal range of motion. Cervical back: Normal range of motion and neck supple. No rigidity or tenderness. Right lower leg: No edema. Left lower leg: No edema. Lymphadenopathy:      Cervical: No cervical adenopathy. Skin:     General: Skin is warm and dry. Capillary Refill: Capillary refill takes less than 2 seconds. Coloration: Skin is not pale. Neurological:      General: No focal deficit present. Mental Status: She is alert and oriented to person, place, and time. Mental status is at baseline. Cranial Nerves: No cranial nerve deficit. Coordination: Coordination normal.   Psychiatric:         Mood and Affect: Mood normal.         Thought Content: Thought content normal.         Judgment: Judgment normal.         Procedures    MDM            --------------------------------------------- PAST HISTORY ---------------------------------------------  Past Medical History:  has a past medical history of Depression, Endometriosis of pelvis, Prolonged emergence from general anesthesia, and Wound infection. Past Surgical History:  has a past surgical history that includes Tonsillectomy; Myringotomy Tympanostomy Tube Placement; Middle ear surgery;  section; laparoscopy (2017); pr lap,lysis of adhesions (N/A, 2018); Dilation and curettage of uterus (N/A, 2019); and  section (N/A, 2020).     Social History: reports that she has never smoked. She has never used smokeless tobacco. She reports that she does not drink alcohol and does not use drugs. Family History: family history is not on file. The patients home medications have been reviewed. Allergies: Pcn [penicillins]    -------------------------------------------------- RESULTS -------------------------------------------------  Labs:  Results for orders placed or performed during the hospital encounter of 06/04/21   COVID-19, Rapid    Specimen: Nasopharyngeal Swab   Result Value Ref Range    SARS-CoV-2, NAAT Not Detected Not Detected       Radiology:  No orders to display       ------------------------- NURSING NOTES AND VITALS REVIEWED ---------------------------  Date / Time Roomed:  6/4/2021  8:39 AM  ED Bed Assignment:  26/26    The nursing notes within the ED encounter and vital signs as below have been reviewed. /67   Pulse 100   Temp 97.3 °F (36.3 °C) (Oral)   Resp 18   SpO2 97%   Oxygen Saturation Interpretation: Normal      ------------------------------------------ PROGRESS NOTES ------------------------------------------  I have spoken with the patient and discussed todays results, in addition to providing specific details for the plan of care and counseling regarding the diagnosis and prognosis. Their questions are answered at this time and they are agreeable with the plan. I discussed at length with them reasons for immediate return here for re evaluation. They will followup with primary care by calling their office tomorrow. Patient advised to do symptomatic relief at home as well as encourage p.o. fluids. She understands reasons to return the emergency department.  --------------------------------- ADDITIONAL PROVIDER NOTES ---------------------------------  At this time the patient is without objective evidence of an acute process requiring hospitalization or inpatient management.   They have remained hemodynamically stable throughout their entire ED visit and are stable for discharge with outpatient follow-up. The plan has been discussed in detail and they are aware of the specific conditions for emergent return, as well as the importance of follow-up. Discharge Medication List as of 6/4/2021 10:36 AM          Diagnosis:  1. Acute viral syndrome        Disposition:  Patient's disposition: Discharge to home  Patient's condition is stable.          [id number] [place], DO  06/04/21 5502

## 2022-03-26 ENCOUNTER — HOSPITAL ENCOUNTER (EMERGENCY)
Age: 27
Discharge: HOME | End: 2022-03-26
Attending: EMERGENCY MEDICINE
Payer: COMMERCIAL

## 2022-03-26 VITALS
OXYGEN SATURATION: 98 % | WEIGHT: 217 LBS | BODY MASS INDEX: 32.89 KG/M2 | SYSTOLIC BLOOD PRESSURE: 150 MMHG | DIASTOLIC BLOOD PRESSURE: 83 MMHG | HEART RATE: 86 BPM | RESPIRATION RATE: 18 BRPM | HEIGHT: 68 IN | TEMPERATURE: 98 F

## 2022-03-26 DIAGNOSIS — K64.5 HEMORRHOID THROMBOSIS: Primary | ICD-10-CM

## 2022-03-26 PROCEDURE — 46083 INC THROMBOSED HROID XTRNL: CPT

## 2022-03-26 PROCEDURE — 2500000003 HC RX 250 WO HCPCS

## 2022-03-26 PROCEDURE — 99283 EMERGENCY DEPT VISIT LOW MDM: CPT

## 2022-03-26 RX ORDER — LIDOCAINE HYDROCHLORIDE AND EPINEPHRINE 10; 10 MG/ML; UG/ML
INJECTION, SOLUTION INFILTRATION; PERINEURAL
Status: COMPLETED
Start: 2022-03-26 | End: 2022-03-26

## 2022-03-26 RX ORDER — LIDOCAINE HYDROCHLORIDE AND EPINEPHRINE 10; 10 MG/ML; UG/ML
20 INJECTION, SOLUTION INFILTRATION; PERINEURAL ONCE
Status: COMPLETED | OUTPATIENT
Start: 2022-03-26 | End: 2022-03-26

## 2022-03-26 RX ADMIN — LIDOCAINE HYDROCHLORIDE AND EPINEPHRINE 20 ML: 10; 10 INJECTION, SOLUTION INFILTRATION; PERINEURAL at 23:01

## 2022-03-26 RX ADMIN — LIDOCAINE HYDROCHLORIDE,EPINEPHRINE BITARTRATE 20 ML: 10; .01 INJECTION, SOLUTION INFILTRATION; PERINEURAL at 23:01

## 2022-03-26 ASSESSMENT — ENCOUNTER SYMPTOMS
EYE PAIN: 0
RECTAL PAIN: 1
SHORTNESS OF BREATH: 0
SORE THROAT: 0
ABDOMINAL DISTENTION: 0
BACK PAIN: 0
DIARRHEA: 0
COLOR CHANGE: 0
BLOOD IN STOOL: 0
VOMITING: 0
CONSTIPATION: 0
NAUSEA: 0
PHOTOPHOBIA: 0
COUGH: 0
CHEST TIGHTNESS: 0
RHINORRHEA: 0
ABDOMINAL PAIN: 0

## 2022-03-26 ASSESSMENT — PAIN SCALES - GENERAL: PAINLEVEL_OUTOF10: 6

## 2022-03-26 ASSESSMENT — PAIN DESCRIPTION - LOCATION: LOCATION: RECTUM

## 2022-03-27 NOTE — ED PROVIDER NOTES
Name: Miracle Abreu   MRN: [id number]     --------------------------------------------- History of Present Illness ---------------------------------------------  3/26/22, Time: 10:29 PM EDT   Chief Complaint   Patient presents with    Abscess     around rectum, appeared this morning      HPI    Miracle Abreu is a 32 y.o. female, with hx of hemorrhoids 6 months ago, who presents to the ED today for perirectal pain which began this morning. Patient states she has had this pain previously and is due to rectal hemrrhoid. She rates pain 4 out of 10, constant, worse with defecation and sitting, nothing makes better. She has not take anything for this. She denies any bleeding from around the area, denies any urinary symptoms, vaginal pain or discharge, denies any abdominal pain. The pt denies any associated fever, lightheadedness, dizziness, HA, n/v, chest pain, shortness of breath, abd pain, or  complaints. Allg: Pcn [penicillins]   PCP: No primary care provider on file. .    Meds: No current facility-administered medications for this encounter. Current Outpatient Medications:     buPROPion HCl (WELLBUTRIN PO), Take by mouth daily, Disp: , Rfl:     acetaminophen (TYLENOL) 500 MG tablet, Take 1,000 mg by mouth every 6 hours as needed for Pain, Disp: , Rfl:      Review of Systems   Constitutional: Negative for chills, fatigue and fever. HENT: Negative for congestion, rhinorrhea and sore throat. Eyes: Negative for photophobia, pain and visual disturbance. Respiratory: Negative for cough, chest tightness and shortness of breath. Cardiovascular: Negative for chest pain, palpitations and leg swelling. Gastrointestinal: Positive for rectal pain. Negative for abdominal distention, abdominal pain, blood in stool, constipation, diarrhea, nausea and vomiting. Genitourinary: Negative for difficulty urinating, dysuria, hematuria, vaginal bleeding and vaginal discharge.    Musculoskeletal: Negative for back pain, neck pain and neck stiffness. Skin: Negative for color change, rash and wound. Neurological: Negative for dizziness, syncope, light-headedness and headaches. Psychiatric/Behavioral: Negative for agitation, behavioral problems and confusion. Physical Exam  Constitutional:       General: She is not in acute distress. Appearance: Normal appearance. She is normal weight. She is not ill-appearing, toxic-appearing or diaphoretic. HENT:      Head: Normocephalic and atraumatic. Right Ear: External ear normal.      Left Ear: External ear normal.      Nose: Nose normal. No rhinorrhea. Mouth/Throat:      Pharynx: Oropharynx is clear. Eyes:      General: No scleral icterus. Right eye: No discharge. Left eye: No discharge. Extraocular Movements: Extraocular movements intact. Conjunctiva/sclera: Conjunctivae normal.      Pupils: Pupils are equal, round, and reactive to light. Cardiovascular:      Rate and Rhythm: Normal rate and regular rhythm. Pulses: Normal pulses. Pulmonary:      Effort: Pulmonary effort is normal. No respiratory distress. Breath sounds: Normal breath sounds. No stridor. Abdominal:      General: Abdomen is flat. There is no distension. Palpations: Abdomen is soft. Tenderness: There is no abdominal tenderness. There is no guarding. Genitourinary:     Comments: Moderately sized external thrombosed hemorrhoid  (nursing present on exam)  Musculoskeletal:         General: No swelling or tenderness. Normal range of motion. Cervical back: Normal range of motion. Right lower leg: No edema. Left lower leg: No edema. Skin:     General: Skin is warm and dry. Capillary Refill: Capillary refill takes less than 2 seconds. Coloration: Skin is not jaundiced. Findings: No erythema or rash. Neurological:      General: No focal deficit present.       Mental Status: She is alert and oriented to person, place, and time. Psychiatric:         Mood and Affect: Mood normal.         Behavior: Behavior normal.          Procedures     PROCEDURE  3/27/22       Time: 2245    INCISION AND DRAINAGE  Risks, benefits and alternatives procedure described. Performed By: Tin BOLDEN PGY-1. Indication: Painful thrombosed hemorrhoid  Informed consent obtained: verbal  Prep: The skin was prepped with iodine and draped in a sterile fashion. Local Anesthesia: 3 mL of Lidocaine 1% w/ Epi. Incision: The lesion was Incised by scalpal and blood and clot was drained. A wound culture not indicated. The wound not irrigated or packed. The wound was then covered with gauze. Patient tolerated the procedure well. Tin BOLDEN PGY-1    MDM  Number of Diagnoses or Management Options  Hemorrhoid thrombosis  Diagnosis management comments: Mrs. Alexander Caballero is a 31 y/o F pt who presents with rectal pain. Hx of hemorrhoid. On PE, pt alert, in no apparent distress. /83, other vitals WNL. Afebrile. Moderately sized external thrombosed hemorrhoid at 9 oclock position of anus (nursing present on exam). The remainder of exam was benign. Verbal consent obtained. Local Lido w/ Epi used for anesthetic. The lesion was incised and drained, clot was removed. Hemorrhoid was reduced. Pt had immediate relief. The procedure was well tolerated, without complication. Recommended she f/u with a PCP and general surgeon, both provided in paperwork, as this is the second thrombosed hemorrhoid she has had in the past 6 months. Pt was amenable to plan. Remained stable throughout visit. Return precautions given. Pt discharged home.        --------------------------------------------- PAST HISTORY ---------------------------------------------  Past Medical History:  has a past medical history of Depression, Endometriosis of pelvis, Prolonged emergence from general anesthesia, and Wound infection.     Past Surgical History:  has a past surgical history that includes Tonsillectomy; Myringotomy Tympanostomy Tube Placement; Middle ear surgery;  section; laparoscopy (2017); pr lap,lysis of adhesions (N/A, 2018); Dilation and curettage of uterus (N/A, 2019); and  section (N/A, 2020). Social History:  reports that she has never smoked. She has never used smokeless tobacco. She reports that she does not drink alcohol and does not use drugs. Family History: family history is not on file. The patients home medications have been reviewed. Allergies: Pcn [penicillins]    -------------------------------------------------- RESULTS -------------------------------------------------  Labs:  No results found for this visit on 22. Radiology:  No orders to display       ------------------------- NURSING NOTES AND VITALS REVIEWED ---------------------------  Date / Time Roomed:  3/26/2022 10:12 PM  ED Bed Assignment:      The nursing notes within the ED encounter and vital signs as below have been reviewed. BP (!) 150/83   Pulse 86   Temp 98 °F (36.7 °C) (Oral)   Resp 18   Ht 5' 7.5\" (1.715 m)   Wt 217 lb (98.4 kg)   SpO2 98%   BMI 33.49 kg/m²   Oxygen Saturation Interpretation: normal      ------------------------------------------ PROGRESS NOTES ------------------------------------------  2:35 AM EDT  I have spoken with the patient and discussed todays results, in addition to providing specific details for the plan of care and counseling regarding the diagnosis and prognosis. Their questions are answered at this time and they are agreeable with the plan. I discussed at length with them reasons for immediate return here for re evaluation. They will followup with their PCP by calling their office tomorrow and were instructed to return to the ED for any new or worsening symptoms.        --------------------------------- ADDITIONAL PROVIDER NOTES ---------------------------------  At this time the patient is without objective evidence of an acute process requiring hospitalization or inpatient management. They have remained hemodynamically stable throughout their entire ED visit and are stable for discharge with outpatient follow-up. The plan has been discussed in detail and they are aware of the specific conditions for emergent return, as well as the importance of follow-up. Discharge Medication List as of 3/26/2022 11:14 PM          Diagnosis:  1. Hemorrhoid thrombosis        Disposition:  Patient's disposition: Discharge to home  Patient's condition is stable.     DO Tremaine Penaloza Cap, Cap, DO  Resident  03/27/22 1031

## 2022-04-25 NOTE — H&P
[address]                              HISTORY AND PHYSICAL    PATIENT NAME: Liban Bradshaw                :        1995  MED REC NO:   [id number]                            ROOM:  ACCOUNT NO:   [de-identified]                           ADMIT DATE: 2022. PROVIDER:     Gofdrey Green MD    Presenting for surgery 2022. HISTORY OF PRESENT ILLNESS:  A 80-year-old white female with complaints  of chronic pelvic pain unresponsive to use of Nexplanon or NSAIDs. Ultrasound demonstrates uterine and adnexal tenderness. Endometriosis  is suspected. She has a history of endometriosis in the past.  She now  presents for surgical evaluation. PAST MEDICAL HISTORY:  Negative. PAST SURGICAL HISTORY:   x2, laparoscopy, D and C. Ear surgery  and T and A. PAST GYNECOLOGIC HISTORY:  History of SOHA-1. No DVT, PE, other STDs. SOCIAL HISTORY:  No alcohol, tobacco or drugs. FAMILY HISTORY:  Noncontributory. ALLERGIES:  No known drug allergies. MEDICATIONS:  Nexplanon. REVIEW OF SYSTEMS:  Negative for cardiac, respiratory, GI,   abnormalities. PHYSICAL EXAMINATION:  VITAL SIGNS:  Stable. Blood pressure 90/63, week 217, height 5 feet 4  inches. HEENT:  Negative. LUNGS:  Clear to auscultation. CV:  Regular rate and rhythm. BREASTS:  Fibrocystic. ABDOMEN:  Obese, soft, nondistended, nontender. No masses. PELVIC:  External genitalia within normal limits. Vagina, no lesions. Cervix, no lesions. Uterus anteverted, 10 weeks size, mobile and  nontender. Adnexa nontender. No masses. RECTOVAGINAL:  Deferred. EXTREMITIES:  No clubbing, cyanosis, edema. NEURO:  CN II through XII are grossly intact. She is alert and oriented  x4. ASSESSMENT:  The patient with chronic pelvic pain, history of pelvic  endometriosis.     PLAN:  Laparoscopy, possible laparotomy, other procedures as indicated  by operative findings. Risks of the procedure have been reviewed,  including but not limited to infection, bleeding, injury to bowel,  bladder, ureter, major vessels, rectovaginal or vesicovaginal fistula,  coital problems, need for reoperation. She has been notified in the  events of excessive hemorrhage, she may require blood transfusion. All  her questions have been answered and she wishes to proceed with surgery.         Radha Connor MD    D: 04/24/2022 14:07:16       T: 04/24/2022 14:09:38     [email or web address]  Job#: [id number]     Doc#: [id number]

## 2022-04-26 ENCOUNTER — ANESTHESIA EVENT (OUTPATIENT)
Dept: OPERATING ROOM | Age: 27
End: 2022-04-26
Payer: COMMERCIAL

## 2022-04-26 NOTE — PRE-PROCEDURE INSTRUCTIONS
[address] [facility]                                                                                                                    PRE OP INSTRUCTIONS FOR  Andres Cancel        Date: 4/26/2022    Date of surgery: 4/27/2022   Arrival Time:11 am Hospital will call you between 5pm and 7pm with your final arrival time for surgery    1. Do not eat or drink anything after per Dr. Lamont Bennett orders prior to surgery. This includes no water, chewing gum, mints or ice chips. 2. Take the following medications with a small sip of water on the morning of Surgery:      3. Diabetics may take evening dose of insulin but none after midnight. If you feel symptomatic or low blood sugar morning of surgery drink 1-2 ounces of apple juice only. 4. Aspirin, Ibuprofen, Advil, Naproxen, Vitamin E and other Anti-inflammatory products should be stopped  before surgery  as directed by your physician. Take Tylenol only unless instructed otherwise by your surgeon. 5. Check with your Doctor regarding stopping Plavix, Coumadin, Lovenox, Eliquis, Effient, or other blood thinners. 6. Do not smoke,use illicit drugs and do not drink any alcoholic beverages 24 hours prior to surgery. 7. You may brush your teeth the morning of surgery. DO NOT SWALLOW WATER    8. You MUST make arrangements for a responsible adult to take you home after your surgery. You will not be allowed to leave alone or drive yourself home. It is strongly suggested someone stay with you the first 24 hrs. Your surgery will be cancelled if you do not have a ride home. 9. PEDIATRIC PATIENTS ONLY:  A parent/legal guardian must accompany a child scheduled for surgery and plan to stay at the hospital until the child is discharged. Please do not bring other children with you.     10. Please wear simple, loose fitting clothing to the hospital.  Dorice Or not bring valuables (money, credit cards, checkbooks, etc.) Do not wear any makeup (including no eye makeup) or nail polish on your fingers or toes. 11. DO NOT wear any jewelry or piercings on day of surgery. All body piercing jewelry must be removed. 12. Shower the night before surgery with ___Antibacterial soap /OSWALDO WIPES________    13. TOTAL JOINT REPLACEMENT/HYSTERECTOMY PATIENTS ONLY---Remember to bring Blood Bank bracelet to the hospital on the day of surgery. 14. If you have a Living Will and Durable Power of  for Healthcare, please bring in a copy. 15. If appropriate bring crutches, inspirex, WALKER, CANE etc... 12. Notify your Surgeon if you develop any illness between now and surgery time, cough, cold, fever, sore throat, nausea, vomiting, etc.  Please notify your surgeon if you experience dizziness, shortness of breath or blurred vision between now & the time of your surgery. 17. If you have ___dentures, they will be removed before going to the OR; we will provide you a container. If you wear ___contact lenses or ___glasses, they will be removed; please bring a case for them. 18. To provide excellent care visitors will be limited to 2 in the room at any given time. 19. Please bring picture ID and insurance card. 20. Sleep apnea patients need to bring CPAP AND SETTINGS to hospital on day of surgery. 21. During flu season no children under the age of 15 are permitted in the hospital for the safety of all patients. 22. Other                 Please call AMBULATORY CARE if you have any further questions.    [address] [facility]     [address]

## 2022-04-27 ENCOUNTER — ANESTHESIA (OUTPATIENT)
Dept: OPERATING ROOM | Age: 27
End: 2022-04-27
Payer: COMMERCIAL

## 2022-04-27 ENCOUNTER — HOSPITAL ENCOUNTER (OUTPATIENT)
Age: 27
Setting detail: OUTPATIENT SURGERY
Discharge: HOME | End: 2022-04-27
Attending: LEGAL MEDICINE | Admitting: LEGAL MEDICINE
Payer: COMMERCIAL

## 2022-04-27 VITALS
SYSTOLIC BLOOD PRESSURE: 97 MMHG | WEIGHT: 223 LBS | HEIGHT: 67 IN | BODY MASS INDEX: 35 KG/M2 | HEART RATE: 89 BPM | OXYGEN SATURATION: 92 % | TEMPERATURE: 97.9 F | DIASTOLIC BLOOD PRESSURE: 56 MMHG | RESPIRATION RATE: 16 BRPM

## 2022-04-27 VITALS
RESPIRATION RATE: 1 BRPM | SYSTOLIC BLOOD PRESSURE: 111 MMHG | OXYGEN SATURATION: 97 % | DIASTOLIC BLOOD PRESSURE: 61 MMHG | TEMPERATURE: 97.2 F

## 2022-04-27 PROBLEM — R10.2 PELVIC PAIN IN FEMALE: Status: ACTIVE | Noted: 2022-04-27

## 2022-04-27 LAB
ABO/RH: NORMAL
ANTIBODY SCREEN: NORMAL
HCG QUALITATIVE: NEGATIVE
HCT VFR BLD CALC: 38.2 % (ref 34–48)
HEMOGLOBIN: 12.1 G/DL (ref 11.5–15.5)
MCH RBC QN AUTO: 27.9 PG (ref 26–35)
MCHC RBC AUTO-ENTMCNC: 31.7 % (ref 32–34.5)
MCV RBC AUTO: 88 FL (ref 80–99.9)
PDW BLD-RTO: 15.5 FL (ref 11.5–15)
PLATELET # BLD: 243 E9/L (ref 130–450)
PMV BLD AUTO: 10.1 FL (ref 7–12)
RBC # BLD: 4.34 E12/L (ref 3.5–5.5)
WBC # BLD: 6.9 E9/L (ref 4.5–11.5)

## 2022-04-27 PROCEDURE — 2580000003 HC RX 258: Performed by: LEGAL MEDICINE

## 2022-04-27 PROCEDURE — 3600000014 HC SURGERY LEVEL 4 ADDTL 15MIN: Performed by: LEGAL MEDICINE

## 2022-04-27 PROCEDURE — 3600000004 HC SURGERY LEVEL 4 BASE: Performed by: LEGAL MEDICINE

## 2022-04-27 PROCEDURE — 3700000000 HC ANESTHESIA ATTENDED CARE: Performed by: LEGAL MEDICINE

## 2022-04-27 PROCEDURE — 7100000011 HC PHASE II RECOVERY - ADDTL 15 MIN: Performed by: LEGAL MEDICINE

## 2022-04-27 PROCEDURE — 86900 BLOOD TYPING SEROLOGIC ABO: CPT

## 2022-04-27 PROCEDURE — 6360000002 HC RX W HCPCS: Performed by: LEGAL MEDICINE

## 2022-04-27 PROCEDURE — 7100000010 HC PHASE II RECOVERY - FIRST 15 MIN: Performed by: LEGAL MEDICINE

## 2022-04-27 PROCEDURE — 7100000001 HC PACU RECOVERY - ADDTL 15 MIN: Performed by: LEGAL MEDICINE

## 2022-04-27 PROCEDURE — 6360000002 HC RX W HCPCS

## 2022-04-27 PROCEDURE — 3700000001 HC ADD 15 MINUTES (ANESTHESIA): Performed by: LEGAL MEDICINE

## 2022-04-27 PROCEDURE — 36415 COLL VENOUS BLD VENIPUNCTURE: CPT

## 2022-04-27 PROCEDURE — 2709999900 HC NON-CHARGEABLE SUPPLY: Performed by: LEGAL MEDICINE

## 2022-04-27 PROCEDURE — 86850 RBC ANTIBODY SCREEN: CPT

## 2022-04-27 PROCEDURE — 2580000003 HC RX 258

## 2022-04-27 PROCEDURE — 7100000000 HC PACU RECOVERY - FIRST 15 MIN: Performed by: LEGAL MEDICINE

## 2022-04-27 PROCEDURE — 86901 BLOOD TYPING SEROLOGIC RH(D): CPT

## 2022-04-27 PROCEDURE — 84703 CHORIONIC GONADOTROPIN ASSAY: CPT

## 2022-04-27 PROCEDURE — 2500000003 HC RX 250 WO HCPCS

## 2022-04-27 PROCEDURE — 85027 COMPLETE CBC AUTOMATED: CPT

## 2022-04-27 RX ORDER — GLYCOPYRROLATE 1 MG/5 ML
SYRINGE (ML) INTRAVENOUS PRN
Status: DISCONTINUED | OUTPATIENT
Start: 2022-04-27 | End: 2022-04-27 | Stop reason: SDUPTHER

## 2022-04-27 RX ORDER — SODIUM CHLORIDE 9 MG/ML
INJECTION, SOLUTION INTRAVENOUS PRN
Status: DISCONTINUED | OUTPATIENT
Start: 2022-04-27 | End: 2022-04-27 | Stop reason: HOSPADM

## 2022-04-27 RX ORDER — SODIUM CHLORIDE 0.9 % (FLUSH) 0.9 %
5-40 SYRINGE (ML) INJECTION EVERY 12 HOURS SCHEDULED
Status: DISCONTINUED | OUTPATIENT
Start: 2022-04-27 | End: 2022-04-27 | Stop reason: HOSPADM

## 2022-04-27 RX ORDER — OXYCODONE HYDROCHLORIDE AND ACETAMINOPHEN 5; 325 MG/1; MG/1
1 TABLET ORAL EVERY 4 HOURS PRN
Status: CANCELLED | OUTPATIENT
Start: 2022-04-27

## 2022-04-27 RX ORDER — OXYCODONE HYDROCHLORIDE 5 MG/1
5 TABLET ORAL PRN
Status: DISCONTINUED | OUTPATIENT
Start: 2022-04-27 | End: 2022-04-27 | Stop reason: HOSPADM

## 2022-04-27 RX ORDER — SODIUM CHLORIDE, SODIUM LACTATE, POTASSIUM CHLORIDE, CALCIUM CHLORIDE 600; 310; 30; 20 MG/100ML; MG/100ML; MG/100ML; MG/100ML
INJECTION, SOLUTION INTRAVENOUS CONTINUOUS
Status: CANCELLED | OUTPATIENT
Start: 2022-04-27

## 2022-04-27 RX ORDER — SODIUM CHLORIDE 9 MG/ML
INJECTION, SOLUTION INTRAVENOUS PRN
Status: CANCELLED | OUTPATIENT
Start: 2022-04-27

## 2022-04-27 RX ORDER — FENTANYL CITRATE 50 UG/ML
25 INJECTION, SOLUTION INTRAMUSCULAR; INTRAVENOUS EVERY 5 MIN PRN
Status: DISCONTINUED | OUTPATIENT
Start: 2022-04-27 | End: 2022-04-27 | Stop reason: HOSPADM

## 2022-04-27 RX ORDER — SODIUM CHLORIDE 0.9 % (FLUSH) 0.9 %
5-40 SYRINGE (ML) INJECTION EVERY 12 HOURS SCHEDULED
Status: CANCELLED | OUTPATIENT
Start: 2022-04-27

## 2022-04-27 RX ORDER — CEFAZOLIN SODIUM 2 G/50ML
2000 SOLUTION INTRAVENOUS
Status: COMPLETED | OUTPATIENT
Start: 2022-04-27 | End: 2022-04-27

## 2022-04-27 RX ORDER — SODIUM CHLORIDE, SODIUM LACTATE, POTASSIUM CHLORIDE, CALCIUM CHLORIDE 600; 310; 30; 20 MG/100ML; MG/100ML; MG/100ML; MG/100ML
INJECTION, SOLUTION INTRAVENOUS CONTINUOUS
Status: DISCONTINUED | OUTPATIENT
Start: 2022-04-27 | End: 2022-04-27 | Stop reason: HOSPADM

## 2022-04-27 RX ORDER — MIDAZOLAM HYDROCHLORIDE 1 MG/ML
INJECTION INTRAMUSCULAR; INTRAVENOUS PRN
Status: DISCONTINUED | OUTPATIENT
Start: 2022-04-27 | End: 2022-04-27 | Stop reason: SDUPTHER

## 2022-04-27 RX ORDER — FENTANYL CITRATE 50 UG/ML
INJECTION, SOLUTION INTRAMUSCULAR; INTRAVENOUS PRN
Status: DISCONTINUED | OUTPATIENT
Start: 2022-04-27 | End: 2022-04-27 | Stop reason: SDUPTHER

## 2022-04-27 RX ORDER — DEXAMETHASONE SODIUM PHOSPHATE 4 MG/ML
INJECTION, SOLUTION INTRA-ARTICULAR; INTRALESIONAL; INTRAMUSCULAR; INTRAVENOUS; SOFT TISSUE PRN
Status: DISCONTINUED | OUTPATIENT
Start: 2022-04-27 | End: 2022-04-27 | Stop reason: SDUPTHER

## 2022-04-27 RX ORDER — ACETAMINOPHEN 325 MG/1
650 TABLET ORAL EVERY 4 HOURS PRN
Status: CANCELLED | OUTPATIENT
Start: 2022-04-27

## 2022-04-27 RX ORDER — SODIUM CHLORIDE 0.9 % (FLUSH) 0.9 %
5-40 SYRINGE (ML) INJECTION PRN
Status: CANCELLED | OUTPATIENT
Start: 2022-04-27

## 2022-04-27 RX ORDER — LIDOCAINE HYDROCHLORIDE 20 MG/ML
INJECTION, SOLUTION INTRAVENOUS PRN
Status: DISCONTINUED | OUTPATIENT
Start: 2022-04-27 | End: 2022-04-27 | Stop reason: SDUPTHER

## 2022-04-27 RX ORDER — SODIUM CHLORIDE 0.9 % (FLUSH) 0.9 %
5-40 SYRINGE (ML) INJECTION PRN
Status: DISCONTINUED | OUTPATIENT
Start: 2022-04-27 | End: 2022-04-27 | Stop reason: HOSPADM

## 2022-04-27 RX ORDER — ONDANSETRON 2 MG/ML
INJECTION INTRAMUSCULAR; INTRAVENOUS PRN
Status: DISCONTINUED | OUTPATIENT
Start: 2022-04-27 | End: 2022-04-27 | Stop reason: SDUPTHER

## 2022-04-27 RX ORDER — SODIUM CHLORIDE 9 MG/ML
INJECTION, SOLUTION INTRAVENOUS CONTINUOUS PRN
Status: DISCONTINUED | OUTPATIENT
Start: 2022-04-27 | End: 2022-04-27 | Stop reason: SDUPTHER

## 2022-04-27 RX ORDER — OXYCODONE HYDROCHLORIDE AND ACETAMINOPHEN 5; 325 MG/1; MG/1
2 TABLET ORAL EVERY 4 HOURS PRN
Status: CANCELLED | OUTPATIENT
Start: 2022-04-27

## 2022-04-27 RX ORDER — PROPOFOL 10 MG/ML
INJECTION, EMULSION INTRAVENOUS PRN
Status: DISCONTINUED | OUTPATIENT
Start: 2022-04-27 | End: 2022-04-27 | Stop reason: SDUPTHER

## 2022-04-27 RX ORDER — OXYCODONE HYDROCHLORIDE 5 MG/1
10 TABLET ORAL PRN
Status: DISCONTINUED | OUTPATIENT
Start: 2022-04-27 | End: 2022-04-27 | Stop reason: HOSPADM

## 2022-04-27 RX ORDER — NEOSTIGMINE METHYLSULFATE 1 MG/ML
INJECTION, SOLUTION INTRAVENOUS PRN
Status: DISCONTINUED | OUTPATIENT
Start: 2022-04-27 | End: 2022-04-27 | Stop reason: SDUPTHER

## 2022-04-27 RX ADMIN — FENTANYL CITRATE 50 MCG: 50 INJECTION, SOLUTION INTRAMUSCULAR; INTRAVENOUS at 14:20

## 2022-04-27 RX ADMIN — Medication 3 MG: at 14:05

## 2022-04-27 RX ADMIN — FENTANYL CITRATE 50 MCG: 50 INJECTION, SOLUTION INTRAMUSCULAR; INTRAVENOUS at 14:35

## 2022-04-27 RX ADMIN — MIDAZOLAM 2 MG: 1 INJECTION INTRAMUSCULAR; INTRAVENOUS at 13:19

## 2022-04-27 RX ADMIN — SODIUM CHLORIDE, POTASSIUM CHLORIDE, SODIUM LACTATE AND CALCIUM CHLORIDE: 600; 310; 30; 20 INJECTION, SOLUTION INTRAVENOUS at 13:15

## 2022-04-27 RX ADMIN — LIDOCAINE HYDROCHLORIDE 100 MG: 20 INJECTION, SOLUTION INTRAVENOUS at 13:38

## 2022-04-27 RX ADMIN — CEFAZOLIN SODIUM 2000 MG: 2 SOLUTION INTRAVENOUS at 13:19

## 2022-04-27 RX ADMIN — DEXAMETHASONE SODIUM PHOSPHATE 4 MG: 4 INJECTION, SOLUTION INTRAMUSCULAR; INTRAVENOUS at 13:19

## 2022-04-27 RX ADMIN — FENTANYL CITRATE 100 MCG: 50 INJECTION, SOLUTION INTRAMUSCULAR; INTRAVENOUS at 13:45

## 2022-04-27 RX ADMIN — ONDANSETRON 4 MG: 2 INJECTION INTRAMUSCULAR; INTRAVENOUS at 13:19

## 2022-04-27 RX ADMIN — SODIUM CHLORIDE: 9 INJECTION, SOLUTION INTRAVENOUS at 14:18

## 2022-04-27 RX ADMIN — PROPOFOL 200 MG: 10 INJECTION, EMULSION INTRAVENOUS at 13:38

## 2022-04-27 RX ADMIN — Medication 0.6 MG: at 14:04

## 2022-04-27 RX ADMIN — FENTANYL CITRATE 100 MCG: 50 INJECTION, SOLUTION INTRAMUSCULAR; INTRAVENOUS at 13:38

## 2022-04-27 RX ADMIN — FENTANYL CITRATE 50 MCG: 50 INJECTION, SOLUTION INTRAMUSCULAR; INTRAVENOUS at 14:31

## 2022-04-27 ASSESSMENT — PULMONARY FUNCTION TESTS
PIF_VALUE: 2
PIF_VALUE: 22
PIF_VALUE: 36
PIF_VALUE: 4
PIF_VALUE: 22
PIF_VALUE: 26
PIF_VALUE: 22
PIF_VALUE: 24
PIF_VALUE: 1
PIF_VALUE: 27
PIF_VALUE: 27
PIF_VALUE: 3
PIF_VALUE: 2
PIF_VALUE: 7
PIF_VALUE: 33
PIF_VALUE: 1
PIF_VALUE: 22
PIF_VALUE: 24
PIF_VALUE: 1
PIF_VALUE: 24
PIF_VALUE: 23
PIF_VALUE: 3
PIF_VALUE: 36
PIF_VALUE: 1
PIF_VALUE: 22
PIF_VALUE: 1
PIF_VALUE: 26
PIF_VALUE: 23
PIF_VALUE: 2
PIF_VALUE: 22
PIF_VALUE: 36
PIF_VALUE: 27
PIF_VALUE: 32
PIF_VALUE: 30
PIF_VALUE: 1
PIF_VALUE: 3
PIF_VALUE: 3
PIF_VALUE: 24
PIF_VALUE: 1
PIF_VALUE: 1
PIF_VALUE: 26
PIF_VALUE: 23
PIF_VALUE: 27
PIF_VALUE: 0
PIF_VALUE: 36
PIF_VALUE: 23
PIF_VALUE: 22
PIF_VALUE: 22
PIF_VALUE: 23
PIF_VALUE: 22
PIF_VALUE: 27
PIF_VALUE: 23
PIF_VALUE: 1
PIF_VALUE: 1
PIF_VALUE: 2
PIF_VALUE: 7
PIF_VALUE: 1
PIF_VALUE: 27
PIF_VALUE: 6
PIF_VALUE: 36
PIF_VALUE: 23
PIF_VALUE: 3
PIF_VALUE: 24
PIF_VALUE: 24
PIF_VALUE: 22
PIF_VALUE: 36
PIF_VALUE: 24
PIF_VALUE: 25
PIF_VALUE: 36
PIF_VALUE: 27
PIF_VALUE: 23
PIF_VALUE: 1
PIF_VALUE: 22
PIF_VALUE: 7
PIF_VALUE: 36

## 2022-04-27 ASSESSMENT — PAIN DESCRIPTION - DESCRIPTORS: DESCRIPTORS: ACHING;TENDER;SORE

## 2022-04-27 ASSESSMENT — PAIN SCALES - GENERAL: PAINLEVEL_OUTOF10: 3

## 2022-04-27 ASSESSMENT — PAIN DESCRIPTION - LOCATION: LOCATION: ABDOMEN

## 2022-04-27 ASSESSMENT — PAIN - FUNCTIONAL ASSESSMENT: PAIN_FUNCTIONAL_ASSESSMENT: NONE - DENIES PAIN

## 2022-04-27 ASSESSMENT — PAIN DESCRIPTION - ORIENTATION: ORIENTATION: MID

## 2022-04-27 NOTE — H&P
H+P no change. Updated. Blood pressure (!) 111/59, pulse 88, temperature 98.8 °F (37.1 °C), temperature source Infrared, resp. rate 18, height 5' 7\" (1.702 m), weight 223 lb (101.2 kg), last menstrual period 03/22/2022, SpO2 97 %, unknown if currently breastfeeding.

## 2022-04-27 NOTE — OP NOTE
[address]                                OPERATIVE REPORT    PATIENT NAME: Clare Hollis                :        1995  MED REC NO:   [id number]                            ROOM:  ACCOUNT NO:   [de-identified]                           ADMIT DATE: 2022. PROVIDER:     Bekah Chavez MD    DATE OF PROCEDURE:  2022    PREOPERATIVE DIAGNOSIS:  Chronic pelvic pain. POSTOPERATIVE DIAGNOSES:  Chronic pelvic pain as well as pelvic  endometriosis, suspected adenomyosis, right ovarian cyst.    PROCEDURE PERFORMED:  Laparoscopy, fulguration of pelvic endometriosis,  right ovarian cystotomy. SURGEON:  Bekah Chavez MD    ANESTHESIA:  General.    EBL:  Minimal.    REPLACEMENT:  500. URINE OUTPUT:  250. FINDINGS:  Boggy globular 10-12 weeks' size retroverted uterus. Adhesions along the vesicouterine fold. Normal-appearing left tube and  ovary. A 3 x 3 cm follicular right ovarian cyst.  Normal-appearing  right tube. Endometriotic implants along the right cul-de-sac. Normal-appearing appendix. COMPLICATIONS:  None. COUNTS:  Sponge, lap, needle counts were correct. DISPOSITION:  The patient went to recovery room in stable condition. PROCEDURE IN DETAIL:  After informed consent was obtained, and under an  adequate level of general anesthesia, the patient was placed in proper  lithotomy position by the nurses. Care was made to assure that there  was moderate flexion at the knees and hips, minimal abduction, external  rotation at the hips. Care was made to assure that there was no undue  pressure on the calves or lateral fibular head. The patient's vagina  and perineum were prepped with Betadine. Abdomen was prepped with  DuraPrep. The patient was grounded. The patient was draped with  sterile drapes. Santoyo catheter was placed in the bladder. Uterine  mobilizer was placed. Surgeon's gloves were changed. Attention was  brought to the abdomen. Small infraumbilical incision was made. Anterior abdominal wall was sharply tented and a Veress needle was  directed into the abdominal cavity at a 45-degree angle. Correct  placement was ensured with the water drop test.  3 liters of CO2 gas was  insufflated into the abdomen. Veress needle was discarded. The  anterior abdominal wall was sharply tented and a 5-mm trocar was  directed into the abdominal cavity at a 45-degree angle. Correct  placement was ensured with the laparoscope. Under direct visualization,  5-mm trocars were placed in the right and left lower quadrants and two  fingerbreadths above the pubic bone with care to avoid the obliterated  umbilical artery and inferior epigastric vessels. Pelvic findings were  as previously indicated. Endometriotic implants were fulgurated. The  ovarian cyst was opened and clear fluid issued. The cyst wall was  unremarkable. All trocars were removed under direct visualization after  CO2 gas was allowed to escape from the abdominal cavity. Skin incisions  reapproximated in running fashion. All instruments were removed from  vagina. Sponge, lap, needle counts were correct. There were no  complications. The patient tolerated procedure well and went to  recovery room in stable condition.         Ab Roger MD    D: 04/27/2022 14:32:24       T: 04/27/2022 14:34:35     [email or web address]  Job#: [id number]     Doc#: [id number]    CC:

## 2022-04-27 NOTE — ANESTHESIA PRE PROCEDURE
Department of Anesthesiology  Preprocedure Note       Name:  Abiodun Cassidy   Age:  32 y.o.  :  1995                                          MRN:  [id number]         Date:  2022      Surgeon: Lc Willis):  Edith Parson MD    Procedure: Procedure(s):  LAPAROSCOPY (83880)([id number])(55052)    Medications prior to admission:   Prior to Admission medications    Medication Sig Start Date End Date Taking? Authorizing Provider   brexpiprazole (REXULTI) 2 MG TABS tablet Take 2 mg by mouth at bedtime   Yes Historical Provider, MD   buPROPion HCl (WELLBUTRIN PO) Take 150 mg by mouth at bedtime     Historical Provider, MD   acetaminophen (TYLENOL) 500 MG tablet Take 1,000 mg by mouth every 6 hours as needed for Pain    Historical Provider, MD       Current medications:    Current Facility-Administered Medications   Medication Dose Route Frequency Provider Last Rate Last Admin    lactated ringers infusion   IntraVENous Continuous Edith Parson MD        sodium chloride flush 0.9 % injection 5-40 mL  5-40 mL IntraVENous 2 times per day Edith Parson MD        sodium chloride flush 0.9 % injection 5-40 mL  5-40 mL IntraVENous PRN Edith Parson MD        0.9 % sodium chloride infusion   IntraVENous PRN Edith Parson MD        ceFAZolin (ANCEF) 2000 mg in dextrose 3 % 50 mL IVPB (duplex)  2,000 mg IntraVENous On Call to [address] [place] [address], MD           Allergies:     Allergies   Allergen Reactions    Pcn [Penicillins] Hives       Problem List:    Patient Active Problem List   Diagnosis Code    Liveborn by  Z38.01    Chronic female pelvic pain R10.2, G89.29    Endometriosis of pelvis N80.3    Miscarriage O03.9    Retained placenta O73.0    24 weeks gestation of pregnancy Z3A.24    27 weeks gestation of pregnancy Z3A.27    37 weeks gestation of pregnancy D2T.21    Complication of pregnancy in third trimester O26.93    Normal pregnancy in third trimester Z34.93     delivery delivered [place] by  Z38.01    Wound infection T14. 8XXA, L08.9       Past Medical History:        Diagnosis Date    Depression     ADHD    Endometriosis of pelvis 2018    Prolonged emergence from general anesthesia     slow to wake up after c section    Wound infection 10/7/2020       Past Surgical History:        Procedure Laterality Date     SECTION       SECTION N/A 2020    REPEAT  SECTION performed [facility]ain [facility] at [facility]    DILATION AND CURETTAGE OF UTERUS N/A 2019    DILATATION AND CURETTAGE SUCTION performed by Germain Gimenez MD at [address] [place] [address]  2017    MIDDLE EAR SURGERY      MYRINGOTOMY AND TYMPANOSTOMY TUBE PLACEMENT      GA LAP,LYSIS OF ADHESIONS N/A 2018    DIAGNOSTIC LAPAROSCOPY. aspiration of left ovarian cyst performed by Tabitha Balderas MD at [place] [address] History:    Social History     Tobacco Use    Smoking status: Never Smoker    Smokeless tobacco: Never Used   Substance Use Topics    Alcohol use: No                                Counseling given: Not Answered      Vital Signs (Current):   Vitals:    22 1107   BP: (!) 111/59   Pulse: 88   Resp: 18   Temp: 98.8 °F (37.1 °C)   TempSrc: Infrared   SpO2: 97%   Weight: 223 lb (101.2 kg)   Height: 5' 7\" (1.702 m)                                              BP Readings from Last 3 Encounters:   22 (!) 111/59   22 (!) 150/83   21 116/67       NPO Status: Time of last liquid consumption: 2200                        Time of last solid consumption: 1800                        Date of last liquid consumption: 22                        Date of last solid food consumption: 22    BMI:   Wt Readings from Last 3 Encounters:   22 223 lb (101.2 kg)   22 217 lb (98.4 kg)   21 205 lb (93 kg)     Body mass index is 34.93 kg/m².     CBC:   Lab Results   Component Value Date WBC 6.9 04/27/2022    RBC 4.34 04/27/2022    HGB 12.1 04/27/2022    HCT 38.2 04/27/2022    MCV 88.0 04/27/2022    RDW 15.5 04/27/2022     04/27/2022       CMP:   Lab Results   Component Value Date     09/21/2020    K 3.7 09/21/2020     09/21/2020    CO2 23 09/21/2020    BUN 10 09/21/2020    CREATININE 0.5 09/21/2020    GFRAA >60 09/21/2020    LABGLOM >60 09/21/2020    GLUCOSE 95 09/21/2020    PROT 6.4 09/16/2020    CALCIUM 8.6 09/21/2020    BILITOT 0.3 09/16/2020    ALKPHOS 99 09/16/2020    AST 23 09/16/2020    ALT 6 09/16/2020       POC Tests: No results for input(s): POCGLU, POCNA, POCK, POCCL, POCBUN, POCHEMO, POCHCT in the last 72 hours. Coags: No results found for: PROTIME, INR, APTT    HCG (If Applicable):   Lab Results   Component Value Date    PREGTESTUR NEGATIVE 11/27/2018    HCG <2.0 12/27/2012        ABGs: No results found for: PHART, PO2ART, FGH1LTC, VNS0TJN, BEART, S3FPBCIQ     Type & Screen (If Applicable):  No results found for: LABABO, LABRH    Drug/Infectious Status (If Applicable):  No results found for: HIV, HEPCAB    COVID-19 Screening (If Applicable):   Lab Results   Component Value Date    COVID19 Not Detected 06/04/2021         Anesthesia Evaluation  Patient summary reviewed no history of anesthetic complications:   Airway: Mallampati: II  TM distance: >3 FB   Neck ROM: full  Mouth opening: > = 3 FB Dental:      Comment: Broken back teeth    Pulmonary:Negative Pulmonary ROS breath sounds clear to auscultation                             Cardiovascular:Negative CV ROS            Rhythm: regular  Rate: normal           Beta Blocker:  Not on Beta Blocker         Neuro/Psych:   (+) psychiatric history: stable with treatmentdepression/anxiety             GI/Hepatic/Renal: Neg GI/Hepatic/Renal ROS            Endo/Other: Negative Endo/Other ROS       Diabetes: Gestation. Abdominal:         (-) obese       Vascular: negative vascular ROS.          Other Findings: Anesthesia Plan      general     ASA 2       Induction: intravenous. MIPS: Postoperative opioids intended and Prophylactic antiemetics administered. Anesthetic plan and risks discussed with patient and spouse. Use of blood products discussed with patient whom consented to blood products. Plan discussed with CRNA.                 [address] Juan A Arellano DO   4/27/2022

## 2022-05-02 ENCOUNTER — HOSPITAL ENCOUNTER (EMERGENCY)
Age: 27
Discharge: HOME | End: 2022-05-02
Payer: COMMERCIAL

## 2022-05-02 VITALS
HEART RATE: 99 BPM | SYSTOLIC BLOOD PRESSURE: 120 MMHG | OXYGEN SATURATION: 97 % | RESPIRATION RATE: 18 BRPM | TEMPERATURE: 97.6 F | DIASTOLIC BLOOD PRESSURE: 78 MMHG

## 2022-05-02 DIAGNOSIS — R10.9 ACUTE POSTOPERATIVE ABDOMINAL PAIN: Primary | ICD-10-CM

## 2022-05-02 DIAGNOSIS — N93.9 VAGINAL BLEEDING: ICD-10-CM

## 2022-05-02 DIAGNOSIS — G89.18 ACUTE POSTOPERATIVE ABDOMINAL PAIN: Primary | ICD-10-CM

## 2022-05-02 LAB
ALBUMIN SERPL-MCNC: 4.5 G/DL (ref 3.5–5.2)
ALP BLD-CCNC: 61 U/L (ref 35–104)
ALT SERPL-CCNC: 15 U/L (ref 0–32)
ANION GAP SERPL CALCULATED.3IONS-SCNC: 10 MMOL/L (ref 7–16)
AST SERPL-CCNC: 16 U/L (ref 0–31)
BASOPHILS ABSOLUTE: 0.03 E9/L (ref 0–0.2)
BASOPHILS RELATIVE PERCENT: 0.3 % (ref 0–2)
BILIRUB SERPL-MCNC: 0.4 MG/DL (ref 0–1.2)
BUN BLDV-MCNC: 18 MG/DL (ref 6–20)
CALCIUM SERPL-MCNC: 9.7 MG/DL (ref 8.6–10.2)
CHLORIDE BLD-SCNC: 103 MMOL/L (ref 98–107)
CO2: 25 MMOL/L (ref 22–29)
CREAT SERPL-MCNC: 0.8 MG/DL (ref 0.5–1)
EOSINOPHILS ABSOLUTE: 0.15 E9/L (ref 0.05–0.5)
EOSINOPHILS RELATIVE PERCENT: 1.7 % (ref 0–6)
GFR AFRICAN AMERICAN: >60
GFR NON-AFRICAN AMERICAN: >60 ML/MIN/1.73
GLUCOSE BLD-MCNC: 100 MG/DL (ref 74–99)
HCT VFR BLD CALC: 40.9 % (ref 34–48)
HEMOGLOBIN: 12.8 G/DL (ref 11.5–15.5)
IMMATURE GRANULOCYTES #: 0.02 E9/L
IMMATURE GRANULOCYTES %: 0.2 % (ref 0–5)
LYMPHOCYTES ABSOLUTE: 1.56 E9/L (ref 1.5–4)
LYMPHOCYTES RELATIVE PERCENT: 17.5 % (ref 20–42)
MCH RBC QN AUTO: 27.3 PG (ref 26–35)
MCHC RBC AUTO-ENTMCNC: 31.3 % (ref 32–34.5)
MCV RBC AUTO: 87.2 FL (ref 80–99.9)
MONOCYTES ABSOLUTE: 0.64 E9/L (ref 0.1–0.95)
MONOCYTES RELATIVE PERCENT: 7.2 % (ref 2–12)
NEUTROPHILS ABSOLUTE: 6.49 E9/L (ref 1.8–7.3)
NEUTROPHILS RELATIVE PERCENT: 73.1 % (ref 43–80)
PDW BLD-RTO: 15.4 FL (ref 11.5–15)
PLATELET # BLD: 296 E9/L (ref 130–450)
PMV BLD AUTO: 10.4 FL (ref 7–12)
POTASSIUM REFLEX MAGNESIUM: 4.4 MMOL/L (ref 3.5–5)
RBC # BLD: 4.69 E12/L (ref 3.5–5.5)
SODIUM BLD-SCNC: 138 MMOL/L (ref 132–146)
TOTAL PROTEIN: 7.8 G/DL (ref 6.4–8.3)
WBC # BLD: 8.9 E9/L (ref 4.5–11.5)

## 2022-05-02 PROCEDURE — 85025 COMPLETE CBC W/AUTO DIFF WBC: CPT

## 2022-05-02 PROCEDURE — 80053 COMPREHEN METABOLIC PANEL: CPT

## 2022-05-02 PROCEDURE — 99283 EMERGENCY DEPT VISIT LOW MDM: CPT

## 2022-05-02 ASSESSMENT — PAIN SCALES - GENERAL: PAINLEVEL_OUTOF10: 7

## 2022-05-02 ASSESSMENT — PAIN DESCRIPTION - FREQUENCY: FREQUENCY: CONTINUOUS

## 2022-05-02 ASSESSMENT — PAIN DESCRIPTION - LOCATION: LOCATION: ABDOMEN

## 2022-05-02 ASSESSMENT — PAIN - FUNCTIONAL ASSESSMENT: PAIN_FUNCTIONAL_ASSESSMENT: 0-10

## 2022-05-02 ASSESSMENT — PAIN DESCRIPTION - PAIN TYPE: TYPE: ACUTE PAIN

## 2022-05-02 ASSESSMENT — PAIN DESCRIPTION - DESCRIPTORS: DESCRIPTORS: CRAMPING

## 2022-05-02 NOTE — ED NOTES
Department of Emergency Medicine  FIRST PROVIDER TRIAGE NOTE             Independent MLP           5/2/22  6:06 PM EDT    Date of Encounter: 5/2/22   MRN: [id number]      HPI: Darnell Rajput is a 32 y.o. female who presents to the ED for Post-op Problem (Pt presents today for vaginal bleeding. Pt states that she had a laproscopic procedure done on 4/27. Pt states that she had birth control implant removed recently as well. ) and Vaginal Bleeding  Patient reports that she removed her IUD approximately 1 week prior. She reports that she did start spotting the day after surgery. ROS: Negative for cp, sob, fever, cough, vomiting, diarrhea, headache or dizziness. PE: Gen Appearance/Constitutional: alert  CV: tachycardia  Pulm: CTA bilat  GI: soft and NT     Initial Plan of Care: All treatment areas with department are currently occupied. Plan to order/Initiate the following while awaiting opening in ED: labs.   Initiate Treatment-Testing, Proceed toTreatment Area When Bed Available for ED Attending/MLP to Continue Care    Electronically signed by JAIDEN Solano   DD: 5/2/22         Talha Solano  05/02/22 [id number]

## 2022-05-03 NOTE — ED PROVIDER NOTES
[address]  Department of Emergency Medicine   ED  Encounter Note  Admit Date/RoomTime: 2022  7:30 PM  ED Room:     NAME: Raven Dillard  : 1995  MRN: [id number]     Chief Complaint:  Post-op Problem (Pt presents today for vaginal bleeding. Pt states that she had a laproscopic procedure done on . Pt states that she had birth control implant removed recently as well. ) and Vaginal Bleeding    History of Present Illness       Raven Dillard is a 32 y.o. old female who presents to the emergency department by private vehicle, for sudden onset cramping pain in the entire abdomen without radiation which began a few day(s) prior to arrival.   There has been no similar episodes in the past.  Since onset the symptoms have been waxing and waning. The pain is associated with vaginal spotting. The pain is aggravated by nothing in particular and relieved by none and nothing. There has been NO chest pain, shortness of breath, fever, chills, vomiting, diarrhea, headache, blurred vision. .   No LMP recorded. .   OB History        4    Para   2    Term   2       0    AB   1    Living   2       SAB   1    IAB        Ectopic        Molar        Multiple   2    Live Births   2            . Patient does report a significant past medical history of IUD removal on 2022 as she is trying to get pregnant. Patient did have a laparoscopic procedure performed on 2022 and does have incisional surgical wounds which are noted to be healing. Patient reports that she is unsure if the vaginal spotting and abdominal cramping is due to her starting her routine menstrual cycle due to taking the IUD out or if these are postop complications. .  ROS   Pertinent positives and negatives are stated within HPI, all other systems reviewed and are negative.     Past Medical History:  has a past medical history of Depression, Endometriosis of pelvis, Prolonged emergence from general anesthesia, and Wound infection. Surgical History has a past surgical history that includes Tonsillectomy; Myringotomy Tympanostomy Tube Placement; Middle ear surgery;  section; laparoscopy (2017); pr lap,lysis of adhesions (N/A, 2018); Dilation and curettage of uterus (N/A, 2019);  section (N/A, 2020); and laparoscopy (N/A, 2022). Social History:  reports that she has never smoked. She has never used smokeless tobacco. She reports that she does not drink alcohol and does not use drugs. Family History: family history is not on file. Allergies: Pcn [penicillins]    Physical Exam   Oxygen Saturation Interpretation: Normal on room air analysis. ED Triage Vitals [22 1721]   BP Temp Temp Source Pulse Resp SpO2 Height Weight   119/74 97.6 °F (36.4 °C) Infrared 104 18 96 % -- --        Physical Exam  General Appearance/Constitutional:  Alert, development consistent with age. HEENT:  NC/NT. PERRLA. Airway patent. Neck:  Supple. No lymphadenopathy. Respiratory:  No retractions. Lungs Clear to auscultation and breath sounds equal.  CV:  Regular rate and rhythm. GI:  normal appearing, non-distended with no visible hernias -very incisional wounds are noted over the abdomen which are without any signs of infection. Well-healing. No wound dehiscence. Tender to palpation overlying these areas. .      Bowel sounds: normal bowel sounds. Tenderness: There is mild tenderness present - located over incisional regions. Liver: non-tender. Spleen:  non-tender. Back: CVA Tenderness: No CVA tenderness. : /Pelvic examination deferred / declined. Integument:  Normal turgor. Warm, dry, without visible rash, unless noted elsewhere. Lymphatics: No edema, cap.refill <3sec. Neurological:  Orientation age-appropriate. Motor functions intact.     Lab / Imaging Results   (All laboratory and radiology results have been personally reviewed by myself)  Labs:  Results for orders placed or performed during the hospital encounter of 05/02/22   CBC with Auto Differential   Result Value Ref Range    WBC 8.9 4.5 - 11.5 E9/L    RBC 4.69 3.50 - 5.50 E12/L    Hemoglobin 12.8 11.5 - 15.5 g/dL    Hematocrit 40.9 34.0 - 48.0 %    MCV 87.2 80.0 - 99.9 fL    MCH 27.3 26.0 - 35.0 pg    MCHC 31.3 (L) 32.0 - 34.5 %    RDW 15.4 (H) 11.5 - 15.0 fL    Platelets 225 531 - 857 E9/L    MPV 10.4 7.0 - 12.0 fL    Neutrophils % 73.1 43.0 - 80.0 %    Immature Granulocytes % 0.2 0.0 - 5.0 %    Lymphocytes % 17.5 (L) 20.0 - 42.0 %    Monocytes % 7.2 2.0 - 12.0 %    Eosinophils % 1.7 0.0 - 6.0 %    Basophils % 0.3 0.0 - 2.0 %    Neutrophils Absolute 6.49 1.80 - 7.30 E9/L    Immature Granulocytes # 0.02 E9/L    Lymphocytes Absolute 1.56 1.50 - 4.00 E9/L    Monocytes Absolute 0.64 0.10 - 0.95 E9/L    Eosinophils Absolute 0.15 0.05 - 0.50 E9/L    Basophils Absolute 0.03 0.00 - 0.20 E9/L   Comprehensive Metabolic Panel w/ Reflex to MG   Result Value Ref Range    Sodium 138 132 - 146 mmol/L    Potassium reflex Magnesium 4.4 3.5 - 5.0 mmol/L    Chloride 103 98 - 107 mmol/L    CO2 25 22 - 29 mmol/L    Anion Gap 10 7 - 16 mmol/L    Glucose 100 (H) 74 - 99 mg/dL    BUN 18 6 - 20 mg/dL    CREATININE 0.8 0.5 - 1.0 mg/dL    GFR Non-African American >60 >=60 mL/min/1.73    GFR African American >60     Calcium 9.7 8.6 - 10.2 mg/dL    Total Protein 7.8 6.4 - 8.3 g/dL    Albumin 4.5 3.5 - 5.2 g/dL    Total Bilirubin 0.4 0.0 - 1.2 mg/dL    Alkaline Phosphatase 61 35 - 104 U/L    ALT 15 0 - 32 U/L    AST 16 0 - 31 U/L     Imaging: All Radiology results interpreted by Radiologist unless otherwise noted. No orders to display     ED Course / Medical Decision Making   Medications - No data to display     Re-Evaluations:  5/2/22      Time: 8:40 PM  Patients condition is improving.   Discussed results of lab work with the patient at this time noting no elevated white blood cell count or signs of blood loss. Patient reported that since she has been within the ER she has not had any vaginal bleeding and is only having mild cramping. It was discussed with patient at this time option for pelvic exam to evaluate for degree of vaginal bleeding. Patient deferred this at this time stating that she does have an appointment with her OB/GYN tomorrow morning and would prefer to have evaluation at that time. I am agreeable. Plan for discharge discussed. Consultations:             None    Procedures:   none    MDM: Patient presented today with abdominal pain status post laparoscopic procedure several days prior. Patient's blood work demonstrates no signs of infection, anemia, or electrolyte abnormalities. Patient did note that she had some vaginal spotting for which she was unsure if this was due to her menstrual cycle starting again as she is recently removed her IUD as she would like to attempt to conceive. Patient was offered pelvic exam with an ED setting today for evaluation of vaginal bleeding as well as additional abnormalities to which she deferred stating that she has a follow-up with her OB/GYN tomorrow which she will present to. And agreeable to this as patient is in no acute distress, and is not soaking through any of her underwear or close with an ED setting and reports that she has not had any bleeding while within ED. Patient is alert and oriented, no acute distress and afebrile. Appropriate for discharge home with follow-up to OB/GYN and PCP. Patient states she is understandable and agreeable to this plan. Plan of Care/Counseling:  JAIDEN Caba reviewed today's visit with the patient in addition to providing specific details for the plan of care and counseling regarding the diagnosis and prognosis. Questions are answered at this time and are agreeable with the plan. Assessment      1. Acute postoperative abdominal pain    2.  Vaginal bleeding      This patient's ED course included: a personal history and physicial examination and re-evaluation prior to disposition  This patient has remained hemodynamically stable and improved during their ED course. Plan   Discharged home  Patient condition is good. New Medications     Discharge Medication List as of 5/2/2022  8:54 PM        Electronically signed by JAIDEN Phillips   DD: 5/2/22  **This report was transcribed using voice recognition software. Every effort was made to ensure accuracy; however, inadvertent computerized transcription errors may be present.   END OF PROVIDER NOTE       Talha Phillips  05/03/22 [id number]

## 2022-07-07 ENCOUNTER — HOSPITAL ENCOUNTER (EMERGENCY)
Age: 27
Discharge: HOME | End: 2022-07-07
Attending: EMERGENCY MEDICINE
Payer: COMMERCIAL

## 2022-07-07 ENCOUNTER — APPOINTMENT (OUTPATIENT)
Dept: ULTRASOUND IMAGING | Age: 27
End: 2022-07-07
Payer: COMMERCIAL

## 2022-07-07 VITALS
DIASTOLIC BLOOD PRESSURE: 65 MMHG | RESPIRATION RATE: 14 BRPM | SYSTOLIC BLOOD PRESSURE: 112 MMHG | TEMPERATURE: 98.4 F | OXYGEN SATURATION: 98 % | HEART RATE: 79 BPM

## 2022-07-07 DIAGNOSIS — O26.899 ABDOMINAL PAIN DURING PREGNANCY, ANTEPARTUM: Primary | ICD-10-CM

## 2022-07-07 DIAGNOSIS — R10.9 ABDOMINAL PAIN DURING PREGNANCY, ANTEPARTUM: Primary | ICD-10-CM

## 2022-07-07 LAB
ALBUMIN SERPL-MCNC: 4.4 G/DL (ref 3.5–5.2)
ALP BLD-CCNC: 65 U/L (ref 35–104)
ALT SERPL-CCNC: 18 U/L (ref 0–32)
ANION GAP SERPL CALCULATED.3IONS-SCNC: 12 MMOL/L (ref 7–16)
AST SERPL-CCNC: 20 U/L (ref 0–31)
BACTERIA: ABNORMAL /HPF
BASOPHILS ABSOLUTE: 0.02 E9/L (ref 0–0.2)
BASOPHILS RELATIVE PERCENT: 0.3 % (ref 0–2)
BILIRUB SERPL-MCNC: 0.2 MG/DL (ref 0–1.2)
BILIRUBIN URINE: NEGATIVE
BLOOD, URINE: NEGATIVE
BUN BLDV-MCNC: 8 MG/DL (ref 6–20)
CALCIUM SERPL-MCNC: 9.7 MG/DL (ref 8.6–10.2)
CHLORIDE BLD-SCNC: 102 MMOL/L (ref 98–107)
CLARITY: CLEAR
CO2: 24 MMOL/L (ref 22–29)
COLOR: YELLOW
CREAT SERPL-MCNC: 0.7 MG/DL (ref 0.5–1)
EOSINOPHILS ABSOLUTE: 0.12 E9/L (ref 0.05–0.5)
EOSINOPHILS RELATIVE PERCENT: 2 % (ref 0–6)
EPITHELIAL CELLS, UA: ABNORMAL /HPF
GFR AFRICAN AMERICAN: >60
GFR NON-AFRICAN AMERICAN: >60 ML/MIN/1.73
GLUCOSE BLD-MCNC: 96 MG/DL (ref 74–99)
GLUCOSE URINE: NEGATIVE MG/DL
GONADOTROPIN, CHORIONIC (HCG) QUANT: 277 MIU/ML
HCG, URINE, POC: POSITIVE
HCT VFR BLD CALC: 38.8 % (ref 34–48)
HEMOGLOBIN: 12.2 G/DL (ref 11.5–15.5)
IMMATURE GRANULOCYTES #: 0.01 E9/L
IMMATURE GRANULOCYTES %: 0.2 % (ref 0–5)
KETONES, URINE: NEGATIVE MG/DL
LEUKOCYTE ESTERASE, URINE: NEGATIVE
LYMPHOCYTES ABSOLUTE: 1.43 E9/L (ref 1.5–4)
LYMPHOCYTES RELATIVE PERCENT: 23.8 % (ref 20–42)
Lab: NORMAL
MCH RBC QN AUTO: 27.5 PG (ref 26–35)
MCHC RBC AUTO-ENTMCNC: 31.4 % (ref 32–34.5)
MCV RBC AUTO: 87.6 FL (ref 80–99.9)
MONOCYTES ABSOLUTE: 0.56 E9/L (ref 0.1–0.95)
MONOCYTES RELATIVE PERCENT: 9.3 % (ref 2–12)
NEGATIVE QC PASS/FAIL: NORMAL
NEUTROPHILS ABSOLUTE: 3.87 E9/L (ref 1.8–7.3)
NEUTROPHILS RELATIVE PERCENT: 64.4 % (ref 43–80)
NITRITE, URINE: NEGATIVE
PDW BLD-RTO: 15.9 FL (ref 11.5–15)
PH UA: 5.5 (ref 5–9)
PLATELET # BLD: 274 E9/L (ref 130–450)
PMV BLD AUTO: 10.1 FL (ref 7–12)
POSITIVE QC PASS/FAIL: NORMAL
POTASSIUM SERPL-SCNC: 4.4 MMOL/L (ref 3.5–5)
PROTEIN UA: NEGATIVE MG/DL
RBC # BLD: 4.43 E12/L (ref 3.5–5.5)
RBC UA: ABNORMAL /HPF (ref 0–2)
SODIUM BLD-SCNC: 138 MMOL/L (ref 132–146)
SPECIFIC GRAVITY UA: >=1.03 (ref 1–1.03)
TOTAL PROTEIN: 7.5 G/DL (ref 6.4–8.3)
UROBILINOGEN, URINE: 0.2 E.U./DL
WBC # BLD: 6 E9/L (ref 4.5–11.5)
WBC UA: ABNORMAL /HPF (ref 0–5)

## 2022-07-07 PROCEDURE — 84702 CHORIONIC GONADOTROPIN TEST: CPT

## 2022-07-07 PROCEDURE — 99283 EMERGENCY DEPT VISIT LOW MDM: CPT

## 2022-07-07 PROCEDURE — 85025 COMPLETE CBC W/AUTO DIFF WBC: CPT

## 2022-07-07 PROCEDURE — 80053 COMPREHEN METABOLIC PANEL: CPT

## 2022-07-07 PROCEDURE — 76817 TRANSVAGINAL US OBSTETRIC: CPT

## 2022-07-07 PROCEDURE — 81001 URINALYSIS AUTO W/SCOPE: CPT

## 2022-07-07 NOTE — ED NOTES
Department of Emergency Medicine  FIRST PROVIDER TRIAGE NOTE             Independent MLP           7/7/22  5:45 PM EDT    Date of Encounter: 7/7/22   MRN: [id number]      HPI: Ashley Bellamy is a 32 y.o. female who presents to the ED for Abdominal Pain (Recent positive pregnancy test. Denies vaginal bleeding/discharge.)     ROS: Negative for fever, vomiting or diarrhea. PE: Gen Appearance/Constitutional: alert     Initial Plan of Care: All treatment areas with department are currently occupied. Plan to order/Initiate the following while awaiting opening in ED: labs.   Initiate Treatment-Testing, Proceed toTreatment Area When Bed Available for ED Attending/MLP to Continue Care    Electronically signed by Margaret Ventura PA-C   DD: 7/7/22         Margaret Ventura PA-C  07/07/22 [id number]

## 2022-07-08 ASSESSMENT — ENCOUNTER SYMPTOMS
SHORTNESS OF BREATH: 0
COUGH: 0
BACK PAIN: 0
ABDOMINAL PAIN: 1

## 2022-07-08 NOTE — ED PROVIDER NOTES
This is a 49-year-old female who is G3, P2 with a past medical history who presents to the ED for evaluation of abdominal pain. Patient states that her last peer about 1 month. Patient states past 2 to 3 days she has been having intermittent pain around the site of her . States that her cramping-like sensation. Patient has no active pain vaginal bleeding or vaginal discharge patient states she did take a home preganancy test which positive. Patient has no lightheadedness. She states that she did call her OB and can not get in for several weeks. She has no chest pain or shortness of breath. She states that this feels simialr to the last time she was pregnant. The history is provided by the patient. No  was used. Review of Systems   Constitutional: Negative for fever. HENT: Negative for congestion. Eyes: Negative for visual disturbance. Respiratory: Negative for cough and shortness of breath. Cardiovascular: Negative for chest pain. Gastrointestinal: Positive for abdominal pain. Endocrine: Negative for polyuria. Genitourinary: Negative for dysuria. Musculoskeletal: Negative for back pain. Skin: Negative for rash. Allergic/Immunologic: Negative for immunocompromised state. Neurological: Negative for headaches. Hematological: Does not bruise/bleed easily. Psychiatric/Behavioral: Negative for confusion. Physical Exam  Vitals and nursing note reviewed. Constitutional:       General: She is not in acute distress. Appearance: She is well-developed. HENT:      Head: Normocephalic and atraumatic. Neck:      Vascular: No JVD. Cardiovascular:      Rate and Rhythm: Normal rate and regular rhythm. Pulmonary:      Effort: Pulmonary effort is normal.   Abdominal:      General: There is no distension. Palpations: Abdomen is soft. Musculoskeletal:      Cervical back: Normal range of motion. Skin:     General: Skin is warm and dry. Neurological:      Mental Status: She is alert and oriented to person, place, and time. Cranial Nerves: No cranial nerve deficit. Psychiatric:         Behavior: Behavior normal.          Procedures     MDM  Number of Diagnoses or Management Options  Abdominal pain during pregnancy, antepartum  Diagnosis management comments: Patient is a pleasant 30-year-old female who presented to the ED for evaluation of pain at her surgical-recent positive pregnancy test.  Patient had no active pain here in the department her beta quant was only 250 abdomen on multiple checks and nontender no CVA tenderness. Patient had no vaginal bleeding ultrasound showed no definable intrauterine's however this could be early versus a pregnancy hemoglobin was stable I spoke with her OB/GYN who stated she would tomorrow in the office again recheck of the abdomen nontender I did recommend that she call and follow-up if she needs a repeat hCG within the next 48 hours intermittent return should her pain worsen develop any bleeding lightheadedness or any other whatsoever.                    --------------------------------------------- PAST HISTORY ---------------------------------------------  Past Medical History:  has a past medical history of Depression, Endometriosis of pelvis, Miscarriage, Prolonged emergence from general anesthesia, and Wound infection. Past Surgical History:  has a past surgical history that includes Tonsillectomy; Myringotomy Tympanostomy Tube Placement; Middle ear surgery;  section; laparoscopy (2017); pr lap,lysis of adhesions (N/A, 2018); Dilation and curettage of uterus (N/A, 2019);  section (N/A, 2020); and laparoscopy (N/A, 2022). Social History:  reports that she has never smoked. She has never used smokeless tobacco. She reports that she does not drink alcohol and does not use drugs. Family History: family history is not on file.      The patients home medications have been reviewed.     Allergies: Pcn [penicillins]    -------------------------------------------------- RESULTS -------------------------------------------------  Labs:  Results for orders placed or performed during the hospital encounter of 07/07/22   CBC with Auto Differential   Result Value Ref Range    WBC 6.0 4.5 - 11.5 E9/L    RBC 4.43 3.50 - 5.50 E12/L    Hemoglobin 12.2 11.5 - 15.5 g/dL    Hematocrit 38.8 34.0 - 48.0 %    MCV 87.6 80.0 - 99.9 fL    MCH 27.5 26.0 - 35.0 pg    MCHC 31.4 (L) 32.0 - 34.5 %    RDW 15.9 (H) 11.5 - 15.0 fL    Platelets 321 426 - 579 E9/L    MPV 10.1 7.0 - 12.0 fL    Neutrophils % 64.4 43.0 - 80.0 %    Immature Granulocytes % 0.2 0.0 - 5.0 %    Lymphocytes % 23.8 20.0 - 42.0 %    Monocytes % 9.3 2.0 - 12.0 %    Eosinophils % 2.0 0.0 - 6.0 %    Basophils % 0.3 0.0 - 2.0 %    Neutrophils Absolute 3.87 1.80 - 7.30 E9/L    Immature Granulocytes # 0.01 E9/L    Lymphocytes Absolute 1.43 (L) 1.50 - 4.00 E9/L    Monocytes Absolute 0.56 0.10 - 0.95 E9/L    Eosinophils Absolute 0.12 0.05 - 0.50 E9/L    Basophils Absolute 0.02 0.00 - 0.20 E9/L   Comprehensive Metabolic Panel   Result Value Ref Range    Sodium 138 132 - 146 mmol/L    Potassium 4.4 3.5 - 5.0 mmol/L    Chloride 102 98 - 107 mmol/L    CO2 24 22 - 29 mmol/L    Anion Gap 12 7 - 16 mmol/L    Glucose 96 74 - 99 mg/dL    BUN 8 6 - 20 mg/dL    CREATININE 0.7 0.5 - 1.0 mg/dL    GFR Non-African American >60 >=60 mL/min/1.73    GFR African American >60     Calcium 9.7 8.6 - 10.2 mg/dL    Total Protein 7.5 6.4 - 8.3 g/dL    Albumin 4.4 3.5 - 5.2 g/dL    Total Bilirubin 0.2 0.0 - 1.2 mg/dL    Alkaline Phosphatase 65 35 - 104 U/L    ALT 18 0 - 32 U/L    AST 20 0 - 31 U/L   HCG, Quantitative, Pregnancy   Result Value Ref Range    hCG Quant 277.0 (H) <10 mIU/mL   Urinalysis with Microscopic   Result Value Ref Range    Color, UA Yellow Straw/Yellow    Clarity, UA Clear Clear    Glucose, Ur Negative Negative mg/dL    Bilirubin Urine Negative Negative    Ketones, Urine Negative Negative mg/dL    Specific Gravity, UA >=1.030 1.005 - 1.030    Blood, Urine Negative Negative    pH, UA 5.5 5.0 - 9.0    Protein, UA Negative Negative mg/dL    Urobilinogen, Urine 0.2 <2.0 E.U./dL    Nitrite, Urine Negative Negative    Leukocyte Esterase, Urine Negative Negative    WBC, UA 0-1 0 - 5 /HPF    RBC, UA NONE 0 - 2 /HPF    Epithelial Cells, UA MANY /HPF    Bacteria, UA MODERATE (A) None Seen /HPF   POC Pregnancy Urine Qual   Result Value Ref Range    HCG, Urine, POC Positive Negative    Lot Number [id number]     Positive QC Pass/Fail Pass     Negative QC Pass/Fail Pass        Radiology:  US OB TRANSVAGINAL   Final Result   1. No definable intrauterine gestational sac. Mild endometrial fluid noted. 2. Differential considerations include early IUP (not yet visible by   ultrasound), ectopic pregnancy, and spontaneous . 3. Recommend clinical correlation including serial beta HCG and follow-up   ultrasound as clinically warranted. ------------------------- NURSING NOTES AND VITALS REVIEWED ---------------------------  Date / Time Roomed:  2022  9:45 PM  ED Bed Assignment:  10/10    The nursing notes within the ED encounter and vital signs as below have been reviewed. /65   Pulse 79   Temp 98.4 °F (36.9 °C) (Oral)   Resp 14   LMP 2022 (Within Weeks)   SpO2 98%   Oxygen Saturation Interpretation: Normal      ------------------------------------------ PROGRESS NOTES ------------------------------------------  4:27 PM EDT  I have spoken with the patient and discussed todays results, in addition to providing specific details for the plan of care and counseling regarding the diagnosis and prognosis. Their questions are answered at this time and they are agreeable with the plan. I discussed at length with them reasons for immediate return here for re evaluation.  They will followup with their OB tomorrow morning.      --------------------------------- ADDITIONAL PROVIDER NOTES ---------------------------------  At this time the patient is without objective evidence of an acute process requiring hospitalization or inpatient management. They have remained hemodynamically stable throughout their entire ED visit and are stable for discharge with outpatient follow-up. The plan has been discussed in detail and they are aware of the specific conditions for emergent return, as well as the importance of follow-up. Discharge Medication List as of 7/7/2022 10:52 PM          Diagnosis:  1. Abdominal pain during pregnancy, antepartum        Disposition:  Patient's disposition: Discharge to home  Patient's condition is stable.       Sorin Snyder DO  07/09/22 2026

## 2022-07-19 ENCOUNTER — HOSPITAL ENCOUNTER (EMERGENCY)
Age: 27
Discharge: HOME | End: 2022-07-19
Payer: COMMERCIAL

## 2022-07-19 ENCOUNTER — APPOINTMENT (OUTPATIENT)
Dept: ULTRASOUND IMAGING | Age: 27
End: 2022-07-19
Payer: COMMERCIAL

## 2022-07-19 VITALS
HEART RATE: 91 BPM | DIASTOLIC BLOOD PRESSURE: 68 MMHG | RESPIRATION RATE: 18 BRPM | TEMPERATURE: 98.2 F | SYSTOLIC BLOOD PRESSURE: 119 MMHG | WEIGHT: 220 LBS | OXYGEN SATURATION: 99 % | BODY MASS INDEX: 34.46 KG/M2

## 2022-07-19 DIAGNOSIS — R10.9 ABDOMINAL PAIN IN PREGNANCY, FIRST TRIMESTER: Primary | ICD-10-CM

## 2022-07-19 DIAGNOSIS — O26.891 ABDOMINAL PAIN IN PREGNANCY, FIRST TRIMESTER: Primary | ICD-10-CM

## 2022-07-19 LAB
ANION GAP SERPL CALCULATED.3IONS-SCNC: 12 MMOL/L (ref 7–16)
BACTERIA: ABNORMAL /HPF
BASOPHILS ABSOLUTE: 0.02 E9/L (ref 0–0.2)
BASOPHILS RELATIVE PERCENT: 0.3 % (ref 0–2)
BILIRUBIN URINE: ABNORMAL
BLOOD, URINE: NEGATIVE
BUN BLDV-MCNC: 9 MG/DL (ref 6–20)
CALCIUM SERPL-MCNC: 9.3 MG/DL (ref 8.6–10.2)
CHLORIDE BLD-SCNC: 100 MMOL/L (ref 98–107)
CLARITY: ABNORMAL
CO2: 23 MMOL/L (ref 22–29)
COLOR: YELLOW
CREAT SERPL-MCNC: 0.7 MG/DL (ref 0.5–1)
EOSINOPHILS ABSOLUTE: 0.02 E9/L (ref 0.05–0.5)
EOSINOPHILS RELATIVE PERCENT: 0.3 % (ref 0–6)
EPITHELIAL CELLS, UA: ABNORMAL /HPF
GFR AFRICAN AMERICAN: >60
GFR NON-AFRICAN AMERICAN: >60 ML/MIN/1.73
GLUCOSE BLD-MCNC: 95 MG/DL (ref 74–99)
GLUCOSE URINE: NEGATIVE MG/DL
GONADOTROPIN, CHORIONIC (HCG) QUANT: ABNORMAL MIU/ML
HCT VFR BLD CALC: 37.8 % (ref 34–48)
HEMOGLOBIN: 12.1 G/DL (ref 11.5–15.5)
IMMATURE GRANULOCYTES #: 0.03 E9/L
IMMATURE GRANULOCYTES %: 0.4 % (ref 0–5)
KETONES, URINE: 40 MG/DL
LEUKOCYTE ESTERASE, URINE: NEGATIVE
LYMPHOCYTES ABSOLUTE: 1.23 E9/L (ref 1.5–4)
LYMPHOCYTES RELATIVE PERCENT: 16.1 % (ref 20–42)
MCH RBC QN AUTO: 27.9 PG (ref 26–35)
MCHC RBC AUTO-ENTMCNC: 32 % (ref 32–34.5)
MCV RBC AUTO: 87.1 FL (ref 80–99.9)
MONOCYTES ABSOLUTE: 0.6 E9/L (ref 0.1–0.95)
MONOCYTES RELATIVE PERCENT: 7.9 % (ref 2–12)
NEUTROPHILS ABSOLUTE: 5.72 E9/L (ref 1.8–7.3)
NEUTROPHILS RELATIVE PERCENT: 75 % (ref 43–80)
NITRITE, URINE: NEGATIVE
PDW BLD-RTO: 15.9 FL (ref 11.5–15)
PH UA: 6 (ref 5–9)
PLATELET # BLD: 281 E9/L (ref 130–450)
PMV BLD AUTO: 10.2 FL (ref 7–12)
POTASSIUM REFLEX MAGNESIUM: 3.8 MMOL/L (ref 3.5–5)
PROTEIN UA: NEGATIVE MG/DL
RBC # BLD: 4.34 E12/L (ref 3.5–5.5)
RBC UA: ABNORMAL /HPF (ref 0–2)
SODIUM BLD-SCNC: 135 MMOL/L (ref 132–146)
SPECIFIC GRAVITY UA: >=1.03 (ref 1–1.03)
UROBILINOGEN, URINE: 1 E.U./DL
WBC # BLD: 7.6 E9/L (ref 4.5–11.5)
WBC UA: ABNORMAL /HPF (ref 0–5)

## 2022-07-19 PROCEDURE — 84702 CHORIONIC GONADOTROPIN TEST: CPT

## 2022-07-19 PROCEDURE — 36415 COLL VENOUS BLD VENIPUNCTURE: CPT

## 2022-07-19 PROCEDURE — 85025 COMPLETE CBC W/AUTO DIFF WBC: CPT

## 2022-07-19 PROCEDURE — 76817 TRANSVAGINAL US OBSTETRIC: CPT

## 2022-07-19 PROCEDURE — 99284 EMERGENCY DEPT VISIT MOD MDM: CPT

## 2022-07-19 PROCEDURE — 81001 URINALYSIS AUTO W/SCOPE: CPT

## 2022-07-19 PROCEDURE — 80048 BASIC METABOLIC PNL TOTAL CA: CPT

## 2022-07-19 ASSESSMENT — PAIN SCALES - GENERAL: PAINLEVEL_OUTOF10: 8

## 2022-07-19 ASSESSMENT — PAIN - FUNCTIONAL ASSESSMENT: PAIN_FUNCTIONAL_ASSESSMENT: 0-10

## 2022-07-19 ASSESSMENT — PAIN DESCRIPTION - DESCRIPTORS: DESCRIPTORS: CRAMPING

## 2022-07-19 ASSESSMENT — PAIN DESCRIPTION - ORIENTATION: ORIENTATION: LOWER

## 2022-07-19 ASSESSMENT — PAIN DESCRIPTION - LOCATION: LOCATION: ABDOMEN

## 2022-07-19 NOTE — ED NOTES
Protocols attempted. Pt did not want me to attempt again. Pt voided at [address], urine was not collected.      Gabriel Masters LPN  96/64/09 [id number]

## 2022-07-19 NOTE — ED NOTES
Department of Emergency Medicine  FIRST PROVIDER TRIAGE NOTE             Independent MLP           7/19/22  6:27 PM EDT    Date of Encounter: 7/19/22   MRN: [id number]      HPI: Jamey Piedra is a 32 y.o. female who presents to the ED for Abdominal Pain (LMP BEGINNING June/ POS PREGNANCY/ LOWER ABD PAIN X 2 HOUR/ NO VAG BLEEDING AT THIS TIME)       ROS: Negative for cp, sob, vomiting, diarrhea, headache, or dizziness. PE: Gen Appearance/Constitutional: alert  CV: regular rate  Pulm: CTA bilat  GI: tender to palpation     Initial Plan of Care: All treatment areas with department are currently occupied. Plan to order/Initiate the following while awaiting opening in ED: labs.   Initiate Treatment-Testing, Proceed toTreatment Area When Bed Available for ED Attending/MLP to Continue Care    Electronically signed by Rachell Ganser, PA   DD: 7/19/22       Rachell Ganser, [place]  07/19/22 7005

## 2022-07-20 NOTE — ED PROVIDER NOTES
[facility]    Department of Emergency Medicine   ED  Provider Note  Admit Date/RoomTime: 2022  9:47 PM  ED Room: 22  10:29 PM EDT    History of Present Illness:   Edward Machado is a 32 y.o. female presenting to the ED for lower abdominal pain which started today. Patient states that she noticed the pain while she was doing dishes. The complaint has been intermittent, mild in severity, and worsened by nothing. Patient states that she is pregnant and she is unsure how far along she is. Her last menstrual period was . Her OBGYN is Dr. Loren Molina. She states that she was seen at the obgyn office and had an ultrasound completed which was too soon to determine IUP or not. She has an appointment scheduled in two days for a repeat ultrasound. She denies any vaginal bleeding, vaginal discharge, lesions, itching, odor, urinary urgency, urinary frequency, hematuria, dysuria, rectal bleeding. Denies any leakage of fluid. Denies fevers or chills. Denies nausea or vomiting. Denies diarrhea. Bowel movements have been normal. She reports eating and drinking normal. She denies any injury or trauma. She is a  with 1 miscarriage in the past.      Review of Systems:   A complete review of systems was performed and pertinent positives and negatives are stated within HPI, all other systems reviewed and are negative.          --------------------------------------------- PAST HISTORY ---------------------------------------------  Past Medical History:  has a past medical history of Depression, Endometriosis of pelvis, Miscarriage, Prolonged emergence from general anesthesia, and Wound infection. Past Surgical History:  has a past surgical history that includes Tonsillectomy; Myringotomy Tympanostomy Tube Placement; Middle ear surgery;  section; laparoscopy (2017); pr lap,lysis of adhesions (N/A, 2018); Dilation and curettage of uterus (N/A, 2019);   section (N/A, 9/16/2020); and laparoscopy (N/A, 4/27/2022). Social History:  reports that she has never smoked. She has never used smokeless tobacco. She reports that she does not drink alcohol and does not use drugs. Family History: family history is not on file. Unless otherwise noted, family history is non contributory    The patients home medications have been reviewed.     Allergies: Pcn [penicillins]    -------------------------------------------------- RESULTS -------------------------------------------------  All laboratory and radiology results have been personally reviewed by myself   LABS:  Results for orders placed or performed during the hospital encounter of 07/19/22   CBC with Auto Differential   Result Value Ref Range    WBC 7.6 4.5 - 11.5 E9/L    RBC 4.34 3.50 - 5.50 E12/L    Hemoglobin 12.1 11.5 - 15.5 g/dL    Hematocrit 37.8 34.0 - 48.0 %    MCV 87.1 80.0 - 99.9 fL    MCH 27.9 26.0 - 35.0 pg    MCHC 32.0 32.0 - 34.5 %    RDW 15.9 (H) 11.5 - 15.0 fL    Platelets 320 419 - 917 E9/L    MPV 10.2 7.0 - 12.0 fL    Neutrophils % 75.0 43.0 - 80.0 %    Immature Granulocytes % 0.4 0.0 - 5.0 %    Lymphocytes % 16.1 (L) 20.0 - 42.0 %    Monocytes % 7.9 2.0 - 12.0 %    Eosinophils % 0.3 0.0 - 6.0 %    Basophils % 0.3 0.0 - 2.0 %    Neutrophils Absolute 5.72 1.80 - 7.30 E9/L    Immature Granulocytes # 0.03 E9/L    Lymphocytes Absolute 1.23 (L) 1.50 - 4.00 E9/L    Monocytes Absolute 0.60 0.10 - 0.95 E9/L    Eosinophils Absolute 0.02 (L) 0.05 - 0.50 E9/L    Basophils Absolute 0.02 0.00 - 0.20 J7/Z   Basic Metabolic Panel w/ Reflex to MG   Result Value Ref Range    Sodium 135 132 - 146 mmol/L    Potassium reflex Magnesium 3.8 3.5 - 5.0 mmol/L    Chloride 100 98 - 107 mmol/L    CO2 23 22 - 29 mmol/L    Anion Gap 12 7 - 16 mmol/L    Glucose 95 74 - 99 mg/dL    BUN 9 6 - 20 mg/dL    CREATININE 0.7 0.5 - 1.0 mg/dL    GFR Non-African American >60 >=60 mL/min/1.73    GFR African American >60     Calcium 9.3 8.6 - 10.2 mg/dL   Urinalysis with Microscopic   Result Value Ref Range    Color, UA Yellow Straw/Yellow    Clarity, UA SLCLOUDY Clear    Glucose, Ur Negative Negative mg/dL    Bilirubin Urine SMALL (A) Negative    Ketones, Urine 40 (A) Negative mg/dL    Specific Gravity, UA >=1.030 1.005 - 1.030    Blood, Urine Negative Negative    pH, UA 6.0 5.0 - 9.0    Protein, UA Negative Negative mg/dL    Urobilinogen, Urine 1.0 <2.0 E.U./dL    Nitrite, Urine Negative Negative    Leukocyte Esterase, Urine Negative Negative    WBC, UA 0-1 0 - 5 /HPF    RBC, UA NONE 0 - 2 /HPF    Epithelial Cells, UA MANY /HPF    Bacteria, UA FEW (A) None Seen /HPF   HCG, Quantitative, Pregnancy   Result Value Ref Range    hCG Quant 14601.0 (H) <10 mIU/mL       RADIOLOGY:  Interpreted by Radiologist.  US OB TRANSVAGINAL   Final Result   1. Single live IUP with average ultrasound age of 5 weeks 5 days. 2. Suggestion of a subchorionic hemorrhage.             ------------------------- NURSING NOTES AND VITALS REVIEWED ---------------------------   The nursing notes within the ED encounter and vital signs as below have been reviewed. /73   Pulse (!) 104   Temp 97.9 °F (36.6 °C) (Oral)   Resp 18   Wt 220 lb (99.8 kg)   LMP 06/01/2022 (Approximate)   SpO2 97%   BMI 34.46 kg/m²   Oxygen Saturation Interpretation: Normal      ---------------------------------------------------PHYSICAL EXAM--------------------------------------    Constitutional/General: Alert and oriented x3, well appearing, non toxic in NAD, pleasant  Head: Normocephalic and atraumatic  Eyes: PERRL, EOMI, conjunctiva normal, sclera non icteric, no eye drainage  Mouth:  handling secretions, no trismus, No tongue/lip swelling. Neck: Supple, full ROM, non tender to palpation in the midline, no stridor, no crepitus, no meningeal signs. No lymphadenopathy. Respiratory: Lungs clear to auscultation bilaterally, no wheezes, rales, or rhonchi. Not in respiratory distress.  Respirations easy and unlabored. Cardiovascular:  S1S2. Regular rate. Regular rhythm. No murmurs, gallops, or rubs. 2+ distal pulses  Chest: No chest wall tenderness  GI:  Abdomen Soft, Non tender, Non distended. +BS x 4 quadrants. No organomegaly, no palpable masses,  No rebound, guarding, or rigidity. No suprapubic tenderness to palpation. No umbilical or flank ecchymosis. No cva tenderness. : patient refused  Musculoskeletal: Moves all extremities x 4. Warm and well perfused, no clubbing, cyanosis, or edema. Capillary refill <3 seconds  Integument: skin warm and dry. No rashes. Lymphatic: no lymphadenopathy noted  Neurologic: GCS 15, no focal deficits, symmetric strength 5/5 in the upper and lower extremities bilaterally  Psychiatric: Normal Affect      ------------------------------ ED COURSE/MEDICAL DECISION MAKING----------------------  Medications - No data to display         Medical Decision Making: At this time the patient is without objective evidence of an acute process requiring hospitalization or inpatient management. They have remained hemodynamically stable throughout their entire ED visit and are stable for discharge with outpatient follow up. The plan has been discussed in detail and they are aware of the specific conditions for emergent return, as well as the importance of follow-up. Patient is a 43-year-old female presenting to the emergency department today for evaluation of lower abdominal pain which started today. Patient overall looks well and she is in no acute distress. Labs obtained which are unremarkable. Ultrasound obtained showing a single live IUP with average ultrasound age of 5 weeks 5 days. Suggestion of a subchorionic hemorrhage. She is not experiencing any vaginal bleeding or vaginal discharge. Patient was offered a pelvic examination to test for STDs/BV/yeast however she refused.   Patient was advised of risks of leaving vaginal infections untreated and she verbalizes understanding. She states that she has an appointment with her OB/GYN doctor Masoud in 2 days for follow-up. She would prefer to have pelvic exam completed at her OB/GYN's office. Patient was advised to return to the emergency department if necessary if symptoms worsen or if new symptoms develop between now and her scheduled appointment. Patient verbalizes understanding. Counseling: The emergency provider has spoken with the patient and discussed todays results, in addition to providing specific details for the plan of care and counseling regarding the diagnosis and prognosis. Questions are answered at this time and they are agreeable with the plan.      --------------------------------- IMPRESSION AND DISPOSITION ---------------------------------    IMPRESSION  1. Abdominal pain in pregnancy, first trimester        DISPOSITION  Disposition: Discharge to home  Patient condition is stable              Tena Briones, JO-ANN - CHASE  07/19/22 [id number]      ATTENDING PROVIDER ATTESTATION:     Supervising Physician, on-site, available for consultation, non-participatory in the evaluation or care of this patient.          [address] [facility],   08/18/22 3655

## 2022-08-24 ENCOUNTER — HOSPITAL ENCOUNTER (EMERGENCY)
Age: 27
Discharge: HOME | End: 2022-08-24
Attending: EMERGENCY MEDICINE
Payer: COMMERCIAL

## 2022-08-24 ENCOUNTER — APPOINTMENT (OUTPATIENT)
Dept: ULTRASOUND IMAGING | Age: 27
End: 2022-08-24
Payer: COMMERCIAL

## 2022-08-24 VITALS
HEIGHT: 67 IN | BODY MASS INDEX: 35 KG/M2 | SYSTOLIC BLOOD PRESSURE: 117 MMHG | TEMPERATURE: 97.6 F | HEART RATE: 98 BPM | WEIGHT: 223 LBS | OXYGEN SATURATION: 97 % | RESPIRATION RATE: 18 BRPM | DIASTOLIC BLOOD PRESSURE: 63 MMHG

## 2022-08-24 DIAGNOSIS — N30.01 ACUTE CYSTITIS WITH HEMATURIA: Primary | ICD-10-CM

## 2022-08-24 DIAGNOSIS — O20.9 VAGINAL BLEEDING IN PREGNANCY, FIRST TRIMESTER: ICD-10-CM

## 2022-08-24 LAB
ABO/RH: NORMAL
ALBUMIN SERPL-MCNC: 3.9 G/DL (ref 3.5–5.2)
ALP BLD-CCNC: 66 U/L (ref 35–104)
ALT SERPL-CCNC: 13 U/L (ref 0–32)
ANION GAP SERPL CALCULATED.3IONS-SCNC: 10 MMOL/L (ref 7–16)
AST SERPL-CCNC: 15 U/L (ref 0–31)
BACTERIA: ABNORMAL /HPF
BASOPHILS ABSOLUTE: 0.01 E9/L (ref 0–0.2)
BASOPHILS RELATIVE PERCENT: 0.2 % (ref 0–2)
BILIRUB SERPL-MCNC: 0.2 MG/DL (ref 0–1.2)
BILIRUBIN URINE: NEGATIVE
BLOOD, URINE: ABNORMAL
BUN BLDV-MCNC: 9 MG/DL (ref 6–20)
CALCIUM SERPL-MCNC: 9.5 MG/DL (ref 8.6–10.2)
CHLORIDE BLD-SCNC: 101 MMOL/L (ref 98–107)
CLARITY: ABNORMAL
CO2: 24 MMOL/L (ref 22–29)
COLOR: YELLOW
CREAT SERPL-MCNC: 0.6 MG/DL (ref 0.5–1)
EOSINOPHILS ABSOLUTE: 0.07 E9/L (ref 0.05–0.5)
EOSINOPHILS RELATIVE PERCENT: 1.1 % (ref 0–6)
EPITHELIAL CELLS, UA: ABNORMAL /HPF
GFR AFRICAN AMERICAN: >60
GFR NON-AFRICAN AMERICAN: >60 ML/MIN/1.73
GLUCOSE BLD-MCNC: 91 MG/DL (ref 74–99)
GLUCOSE URINE: NEGATIVE MG/DL
GONADOTROPIN, CHORIONIC (HCG) QUANT: ABNORMAL MIU/ML
HCT VFR BLD CALC: 36.3 % (ref 34–48)
HEMOGLOBIN: 11.6 G/DL (ref 11.5–15.5)
IMMATURE GRANULOCYTES #: 0.01 E9/L
IMMATURE GRANULOCYTES %: 0.2 % (ref 0–5)
KETONES, URINE: NEGATIVE MG/DL
LEUKOCYTE ESTERASE, URINE: ABNORMAL
LYMPHOCYTES ABSOLUTE: 1.26 E9/L (ref 1.5–4)
LYMPHOCYTES RELATIVE PERCENT: 19.1 % (ref 20–42)
MCH RBC QN AUTO: 28.2 PG (ref 26–35)
MCHC RBC AUTO-ENTMCNC: 32 % (ref 32–34.5)
MCV RBC AUTO: 88.1 FL (ref 80–99.9)
MONOCYTES ABSOLUTE: 0.54 E9/L (ref 0.1–0.95)
MONOCYTES RELATIVE PERCENT: 8.2 % (ref 2–12)
NEUTROPHILS ABSOLUTE: 4.69 E9/L (ref 1.8–7.3)
NEUTROPHILS RELATIVE PERCENT: 71.2 % (ref 43–80)
NITRITE, URINE: NEGATIVE
PDW BLD-RTO: 15.5 FL (ref 11.5–15)
PH UA: 6 (ref 5–9)
PLATELET # BLD: 209 E9/L (ref 130–450)
PMV BLD AUTO: 10.6 FL (ref 7–12)
POTASSIUM SERPL-SCNC: 4.5 MMOL/L (ref 3.5–5)
PROTEIN UA: NEGATIVE MG/DL
RBC # BLD: 4.12 E12/L (ref 3.5–5.5)
RBC UA: ABNORMAL /HPF (ref 0–2)
SODIUM BLD-SCNC: 135 MMOL/L (ref 132–146)
SPECIFIC GRAVITY UA: >=1.03 (ref 1–1.03)
TOTAL PROTEIN: 7.6 G/DL (ref 6.4–8.3)
UROBILINOGEN, URINE: 0.2 E.U./DL
WBC # BLD: 6.6 E9/L (ref 4.5–11.5)
WBC UA: >20 /HPF (ref 0–5)

## 2022-08-24 PROCEDURE — 85025 COMPLETE CBC W/AUTO DIFF WBC: CPT

## 2022-08-24 PROCEDURE — 86901 BLOOD TYPING SEROLOGIC RH(D): CPT

## 2022-08-24 PROCEDURE — 81001 URINALYSIS AUTO W/SCOPE: CPT

## 2022-08-24 PROCEDURE — 87088 URINE BACTERIA CULTURE: CPT

## 2022-08-24 PROCEDURE — 80053 COMPREHEN METABOLIC PANEL: CPT

## 2022-08-24 PROCEDURE — 99284 EMERGENCY DEPT VISIT MOD MDM: CPT

## 2022-08-24 PROCEDURE — 6370000000 HC RX 637 (ALT 250 FOR IP): Performed by: EMERGENCY MEDICINE

## 2022-08-24 PROCEDURE — 76801 OB US < 14 WKS SINGLE FETUS: CPT

## 2022-08-24 PROCEDURE — 84702 CHORIONIC GONADOTROPIN TEST: CPT

## 2022-08-24 PROCEDURE — 86900 BLOOD TYPING SEROLOGIC ABO: CPT

## 2022-08-24 RX ORDER — CEPHALEXIN 250 MG/1
500 CAPSULE ORAL ONCE
Status: COMPLETED | OUTPATIENT
Start: 2022-08-24 | End: 2022-08-24

## 2022-08-24 RX ORDER — CEPHALEXIN 500 MG/1
500 CAPSULE ORAL 4 TIMES DAILY
Qty: 28 CAPSULE | Refills: 0 | Status: SHIPPED | OUTPATIENT
Start: 2022-08-24 | End: 2022-08-31

## 2022-08-24 RX ADMIN — CEPHALEXIN 500 MG: 250 CAPSULE ORAL at 21:20

## 2022-08-24 ASSESSMENT — PAIN SCALES - GENERAL: PAINLEVEL_OUTOF10: 7

## 2022-08-24 ASSESSMENT — PAIN - FUNCTIONAL ASSESSMENT
PAIN_FUNCTIONAL_ASSESSMENT: 0-10
PAIN_FUNCTIONAL_ASSESSMENT: NONE - DENIES PAIN

## 2022-08-24 NOTE — ED PROVIDER NOTES
HPI:  22,   Time: 7:32 PM EDT         Breonna Johnson is a 32 y.o. female presenting to the ED for vaginal bleeding, beginning prior to arrival.  The complaint has been persistent, mild in severity, and worsened by nothing. Patient is 11 weeks pregnant and follows up with Dr. Huntley Schlatter. Patient says that she went to the bathroom and when she wiped there was some pink color on the toilet paper. She denies any bright red blood or clots. Denies any vaginal pain or abdominal pain. Patient has had a previous miscarriage and is coming in because she is concerned she is having a miscarriage today with a pink color in the toilet paper. She has been following up with her OB/GYN. Ultrasounds have been reassuring. Patient says that she was diagnosed with a urinary tract infection on the  and was prescribed antibiotics. She finished the antibiotic course. Today she is having dysuria, increased urgency, increased frequency. ROS:   Pertinent positives and negatives are stated within HPI, all other systems reviewed and are negative.  --------------------------------------------- PAST HISTORY ---------------------------------------------  Past Medical History:  has a past medical history of Depression, Endometriosis of pelvis, Miscarriage, Prolonged emergence from general anesthesia, and Wound infection. Past Surgical History:  has a past surgical history that includes Tonsillectomy; Myringotomy Tympanostomy Tube Placement; Middle ear surgery;  section; laparoscopy (2017); pr lap,lysis of adhesions (N/A, 2018); Dilation and curettage of uterus (N/A, 2019);  section (N/A, 2020); and laparoscopy (N/A, 2022). Social History:  reports that she has never smoked. She has never used smokeless tobacco. She reports that she does not drink alcohol and does not use drugs. Family History: family history is not on file.      The patients home medications have been reviewed.     Allergies: Pcn [penicillins]    -------------------------------------------------- RESULTS -------------------------------------------------  All laboratory and radiology results have been personally reviewed by myself   LABS:  Results for orders placed or performed during the hospital encounter of 08/24/22   CBC with Auto Differential   Result Value Ref Range    WBC 6.6 4.5 - 11.5 E9/L    RBC 4.12 3.50 - 5.50 E12/L    Hemoglobin 11.6 11.5 - 15.5 g/dL    Hematocrit 36.3 34.0 - 48.0 %    MCV 88.1 80.0 - 99.9 fL    MCH 28.2 26.0 - 35.0 pg    MCHC 32.0 32.0 - 34.5 %    RDW 15.5 (H) 11.5 - 15.0 fL    Platelets 920 069 - 747 E9/L    MPV 10.6 7.0 - 12.0 fL    Neutrophils % 71.2 43.0 - 80.0 %    Immature Granulocytes % 0.2 0.0 - 5.0 %    Lymphocytes % 19.1 (L) 20.0 - 42.0 %    Monocytes % 8.2 2.0 - 12.0 %    Eosinophils % 1.1 0.0 - 6.0 %    Basophils % 0.2 0.0 - 2.0 %    Neutrophils Absolute 4.69 1.80 - 7.30 E9/L    Immature Granulocytes # 0.01 E9/L    Lymphocytes Absolute 1.26 (L) 1.50 - 4.00 E9/L    Monocytes Absolute 0.54 0.10 - 0.95 E9/L    Eosinophils Absolute 0.07 0.05 - 0.50 E9/L    Basophils Absolute 0.01 0.00 - 0.20 E9/L   Comprehensive Metabolic Panel   Result Value Ref Range    Sodium 135 132 - 146 mmol/L    Potassium 4.5 3.5 - 5.0 mmol/L    Chloride 101 98 - 107 mmol/L    CO2 24 22 - 29 mmol/L    Anion Gap 10 7 - 16 mmol/L    Glucose 91 74 - 99 mg/dL    BUN 9 6 - 20 mg/dL    Creatinine 0.6 0.5 - 1.0 mg/dL    GFR Non-African American >60 >=60 mL/min/1.73    GFR African American >60     Calcium 9.5 8.6 - 10.2 mg/dL    Total Protein 7.6 6.4 - 8.3 g/dL    Albumin 3.9 3.5 - 5.2 g/dL    Total Bilirubin 0.2 0.0 - 1.2 mg/dL    Alkaline Phosphatase 66 35 - 104 U/L    ALT 13 0 - 32 U/L    AST 15 0 - 31 U/L   HCG, Quantitative, Pregnancy   Result Value Ref Range    hCG Quant 83023.0 (H) <10 mIU/mL   Urinalysis   Result Value Ref Range    Color, UA Yellow Straw/Yellow    Clarity, UA CLOUDY (A) Clear Glucose, Ur Negative Negative mg/dL    Bilirubin Urine Negative Negative    Ketones, Urine Negative Negative mg/dL    Specific Gravity, UA >=1.030 1.005 - 1.030    Blood, Urine TRACE (A) Negative    pH, UA 6.0 5.0 - 9.0    Protein, UA Negative Negative mg/dL    Urobilinogen, Urine 0.2 <2.0 E.U./dL    Nitrite, Urine Negative Negative    Leukocyte Esterase, Urine MODERATE (A) Negative   Microscopic Urinalysis   Result Value Ref Range    WBC, UA >20 (A) 0 - 5 /HPF    RBC, UA 1-3 0 - 2 /HPF    Epithelial Cells, UA RARE /HPF    Bacteria, UA RARE (A) None Seen /HPF   ABO/RH   Result Value Ref Range    ABO/Rh A POS        RADIOLOGY:  Interpreted by Radiologist.  US OB LESS THAN 14 WEEKS SINGLE OR FIRST GESTATION   Final Result   1. Single living intrauterine gestation with an estimated gestational age by   ultrasound of 11 weeks and 5 days which is concordant to the clinical age   provided of 11 weeks and 3 days. Estimated date of delivery based on current   ultrasound is March 10, 2023. Fetal heart rate was 172 beats per minute. 2.  Normal appearance of the bilateral ovaries. Corpus luteum cyst on the   right. Normal ovarian vascularity. There are no adnexal masses. ------------------------- NURSING NOTES AND VITALS REVIEWED ---------------------------   The nursing notes within the ED encounter and vital signs as below have been reviewed.    /63   Pulse 98   Temp 97.6 °F (36.4 °C)   Resp 18   Ht 5' 7\" (1.702 m)   Wt 223 lb (101.2 kg)   LMP 06/07/2022 (Within Weeks)   SpO2 97%   BMI 34.93 kg/m²   Oxygen Saturation Interpretation: Normal      ---------------------------------------------------PHYSICAL EXAM--------------------------------------      Constitutional/General: Alert and oriented x3, well appearing, non toxic in NAD  Head: NC/AT  Eyes: PERRL, EOMI  Mouth: Oropharynx clear, handling secretions, no trismus  Neck: Supple, full ROM, no meningeal signs  Pulmonary: Lungs clear to auscultation bilaterally, no wheezes, rales, or rhonchi. Not in respiratory distress  Cardiovascular:  Regular rate and rhythm, no murmurs, gallops, or rubs. 2+ distal pulses  Abdomen: Soft, non tender, non distended,   Extremities: Moves all extremities x 4. Warm and well perfused  Skin: warm and dry without rash  Neurologic: GCS 15,  Psych: Normal Affect      ------------------------------ ED COURSE/MEDICAL DECISION MAKING----------------------  Medications   cephALEXin (KEFLEX) capsule 500 mg (has no administration in time range)         Medical Decision Making:    Patient declines pelvic exam. Labs and imaging obtained. Urinalysis obtained. Leukocytes and white blood cells were appreciated. There was trace blood. Urine culture was obtained. Pelvic ultrasound was obtained. Single living IUP of 11 weeks 5 days with a fetal heart rate of 172. I discussed results with the patient. She is comfortable with following up with her OB/GYN tomorrow as scheduled. She denies any current vaginal bleeding or vaginal discharge. She continues to decline a pelvic exam.  I will start her on Keflex. Patient does have an allergy to penicillins. She states that she breaks out in hives. No throat closing or respiratory issues. She believes that she was prescribed Keflex earlier in the month. I told the patient to follow-up with her OB/GYN tomorrow, to take the medication prescribed as directed, and to return for worsening symptoms. She is agreeable with plan of care. Counseling: The emergency provider has spoken with the patient and discussed todays results, in addition to providing specific details for the plan of care and counseling regarding the diagnosis and prognosis. Questions are answered at this time and they are agreeable with the plan.      --------------------------------- IMPRESSION AND DISPOSITION ---------------------------------    IMPRESSION  1. Acute cystitis with hematuria    2.  Vaginal bleeding in pregnancy, first trimester        DISPOSITION  Disposition: Discharge to home  Patient condition is stable                  Laurel Holder MD  08/24/22 2284

## 2022-08-26 LAB — URINE CULTURE, ROUTINE: NORMAL

## 2022-09-28 ENCOUNTER — HOSPITAL ENCOUNTER (EMERGENCY)
Age: 27
Discharge: HOME | End: 2022-09-28
Payer: COMMERCIAL

## 2022-09-28 VITALS
DIASTOLIC BLOOD PRESSURE: 57 MMHG | RESPIRATION RATE: 18 BRPM | OXYGEN SATURATION: 99 % | HEART RATE: 93 BPM | SYSTOLIC BLOOD PRESSURE: 114 MMHG | TEMPERATURE: 97.5 F

## 2022-09-28 DIAGNOSIS — S61.412A LACERATION OF LEFT HAND WITHOUT COMPLICATION, INCLUDING FINGERS, INITIAL ENCOUNTER: Primary | ICD-10-CM

## 2022-09-28 DIAGNOSIS — S61.219A LACERATION OF LEFT HAND WITHOUT COMPLICATION, INCLUDING FINGERS, INITIAL ENCOUNTER: Primary | ICD-10-CM

## 2022-09-28 PROCEDURE — 90715 TDAP VACCINE 7 YRS/> IM: CPT | Performed by: PHYSICIAN ASSISTANT

## 2022-09-28 PROCEDURE — 2500000003 HC RX 250 WO HCPCS: Performed by: PHYSICIAN ASSISTANT

## 2022-09-28 PROCEDURE — 99284 EMERGENCY DEPT VISIT MOD MDM: CPT

## 2022-09-28 PROCEDURE — 6360000002 HC RX W HCPCS: Performed by: PHYSICIAN ASSISTANT

## 2022-09-28 PROCEDURE — 12002 RPR S/N/AX/GEN/TRNK2.6-7.5CM: CPT

## 2022-09-28 PROCEDURE — 90471 IMMUNIZATION ADMIN: CPT | Performed by: PHYSICIAN ASSISTANT

## 2022-09-28 RX ORDER — LIDOCAINE HYDROCHLORIDE 10 MG/ML
20 INJECTION, SOLUTION INFILTRATION; PERINEURAL ONCE
Status: COMPLETED | OUTPATIENT
Start: 2022-09-28 | End: 2022-09-28

## 2022-09-28 RX ORDER — BACITRACIN ZINC AND POLYMYXIN B SULFATE 500; 1000 [USP'U]/G; [USP'U]/G
OINTMENT TOPICAL
Qty: 30 G | Refills: 0 | Status: SHIPPED | OUTPATIENT
Start: 2022-09-28 | End: 2022-10-05

## 2022-09-28 RX ADMIN — LIDOCAINE HYDROCHLORIDE 20 ML: 10 INJECTION, SOLUTION INFILTRATION; PERINEURAL at 21:53

## 2022-09-28 RX ADMIN — TETANUS TOXOID, REDUCED DIPHTHERIA TOXOID AND ACELLULAR PERTUSSIS VACCINE, ADSORBED 0.5 ML: 5; 2.5; 8; 8; 2.5 SUSPENSION INTRAMUSCULAR at 21:50

## 2022-09-28 ASSESSMENT — PAIN DESCRIPTION - PAIN TYPE: TYPE: ACUTE PAIN

## 2022-09-28 ASSESSMENT — PAIN SCALES - GENERAL: PAINLEVEL_OUTOF10: 7

## 2022-09-28 ASSESSMENT — PAIN - FUNCTIONAL ASSESSMENT: PAIN_FUNCTIONAL_ASSESSMENT: 0-10

## 2022-09-28 ASSESSMENT — PAIN DESCRIPTION - LOCATION: LOCATION: HAND
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
## 2022-10-25 ENCOUNTER — HOSPITAL ENCOUNTER (EMERGENCY)
Age: 27
Discharge: HOME | End: 2022-10-25
Attending: STUDENT IN AN ORGANIZED HEALTH CARE EDUCATION/TRAINING PROGRAM
Payer: COMMERCIAL

## 2022-10-25 VITALS
HEIGHT: 66 IN | HEART RATE: 80 BPM | SYSTOLIC BLOOD PRESSURE: 100 MMHG | BODY MASS INDEX: 35.36 KG/M2 | TEMPERATURE: 97 F | OXYGEN SATURATION: 99 % | WEIGHT: 220 LBS | RESPIRATION RATE: 18 BRPM | DIASTOLIC BLOOD PRESSURE: 62 MMHG

## 2022-10-25 DIAGNOSIS — H60.502 ACUTE OTITIS EXTERNA OF LEFT EAR, UNSPECIFIED TYPE: Primary | ICD-10-CM

## 2022-10-25 PROCEDURE — 99283 EMERGENCY DEPT VISIT LOW MDM: CPT

## 2022-10-25 RX ORDER — ACETAMINOPHEN 500 MG
1000 TABLET ORAL EVERY 6 HOURS PRN
Qty: 30 TABLET | Refills: 0 | Status: SHIPPED | OUTPATIENT
Start: 2022-10-25

## 2022-10-25 RX ORDER — ACETAMINOPHEN 500 MG
1000 TABLET ORAL EVERY 6 HOURS PRN
COMMUNITY
End: 2022-10-25

## 2022-10-25 RX ORDER — OFLOXACIN 3 MG/ML
10 SOLUTION AURICULAR (OTIC) DAILY
Qty: 3.5 ML | Refills: 0 | Status: SHIPPED | OUTPATIENT
Start: 2022-10-25 | End: 2022-11-01

## 2022-10-25 RX ORDER — CEFDINIR 300 MG/1
300 CAPSULE ORAL 2 TIMES DAILY
Qty: 14 CAPSULE | Refills: 0 | Status: SHIPPED | OUTPATIENT
Start: 2022-10-25 | End: 2022-11-01

## 2022-10-25 ASSESSMENT — ENCOUNTER SYMPTOMS
FACIAL SWELLING: 0
BACK PAIN: 0
VOMITING: 0
COLOR CHANGE: 0
COUGH: 0
SORE THROAT: 1
ABDOMINAL PAIN: 0
SHORTNESS OF BREATH: 0
DIARRHEA: 0
SINUS PAIN: 0
NAUSEA: 0
SINUS PRESSURE: 0

## 2022-10-25 ASSESSMENT — PAIN DESCRIPTION - PAIN TYPE: TYPE: ACUTE PAIN

## 2022-10-25 ASSESSMENT — PAIN DESCRIPTION - DESCRIPTORS: DESCRIPTORS: ACHING

## 2022-10-25 ASSESSMENT — PAIN DESCRIPTION - ORIENTATION: ORIENTATION: LEFT

## 2022-10-25 ASSESSMENT — PAIN SCALES - GENERAL: PAINLEVEL_OUTOF10: 10

## 2022-10-25 ASSESSMENT — PAIN DESCRIPTION - LOCATION: LOCATION: EAR

## 2022-10-25 ASSESSMENT — PAIN DESCRIPTION - FREQUENCY: FREQUENCY: CONTINUOUS

## 2022-10-25 ASSESSMENT — PAIN DESCRIPTION - ONSET: ONSET: ON-GOING

## 2022-10-25 ASSESSMENT — PAIN - FUNCTIONAL ASSESSMENT: PAIN_FUNCTIONAL_ASSESSMENT: 0-10

## 2022-10-25 NOTE — ED PROVIDER NOTES
HPI   80-year-old female patient present emergency department for evaluation of left ear pain. Patient reporting she has been having sore throat for last couple of days, ear pain started yesterday. She is currently approximately 20 weeks pregnant. She denies any fevers, chills, nausea, vomiting, diarrhea, chest pain, shortness of breath, urinary complaints. She states that her sore throat has improved however as she has developed this sharp left-sided ear pain. She has been taking Tylenol with no improvement of symptoms she is also been applying ice pack. Not having any neck pain. 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 denies having gone swimming recently. Review of Systems   Constitutional:  Negative for chills and fever. HENT:  Positive for ear pain and sore throat. Negative for congestion, facial swelling, sinus pressure and sinus pain. Respiratory:  Negative for cough and shortness of breath. Cardiovascular:  Negative for chest pain. Gastrointestinal:  Negative for abdominal pain, diarrhea, nausea and vomiting. Genitourinary:  Negative for difficulty urinating, dysuria and hematuria. Musculoskeletal:  Negative for back pain. Skin:  Negative for color change. All other systems reviewed and are negative. Physical Exam  Vitals and nursing note reviewed. Constitutional:       Appearance: Normal appearance. HENT:      Head: Normocephalic and atraumatic. Ears:      Comments: Left ear canal is mildly erythematous, swollen, crusted I am able to visualize part of the TM and is erythematous and has crusting on it as well, does not appear perforated. Right TM normal.     Nose: Nose normal. No congestion. Mouth/Throat:      Mouth: Mucous membranes are moist.      Pharynx: Oropharynx is clear. Eyes:      Conjunctiva/sclera: Conjunctivae normal.      Pupils: Pupils are equal, round, and reactive to light.    Cardiovascular:      Rate and Rhythm: Normal rate and regular rhythm. Pulses: Normal pulses. Heart sounds: Normal heart sounds. Pulmonary:      Effort: Pulmonary effort is normal. No respiratory distress. Breath sounds: Normal breath sounds. Abdominal:      General: Bowel sounds are normal. There is no distension. Tenderness: There is no abdominal tenderness. Musculoskeletal:         General: Normal range of motion. Cervical back: Normal range of motion and neck supple. Skin:     General: Skin is warm and dry. Capillary Refill: Capillary refill takes less than 2 seconds. Neurological:      General: No focal deficit present. Mental Status: She is alert. Procedures     MDM  Patient here with left ear pain found to have acute otitis externa. Currently pregnant, options for pain are limited. Patient to continue Tylenol as she has been doing. Ofloxacin back appears to be safe in pregnancy, she may have possible acute otitis media as well she is allergic to penicillin so I gave patient Omnicef. She is to follow-up for fevers, worsening pain, worsening symptoms. Close return precautions were discussed with patient she is agreeable and understanding of plan. I gave her instructions on keeping the area dry as well.              --------------------------------------------- PAST HISTORY ---------------------------------------------  Past Medical History:  has a past medical history of Depression, Endometriosis of pelvis, Miscarriage, Prolonged emergence from general anesthesia, and Wound infection. Past Surgical History:  has a past surgical history that includes Tonsillectomy; Myringotomy Tympanostomy Tube Placement; Middle ear surgery;  section; laparoscopy (2017); pr lap,lysis of adhesions (N/A, 2018); Dilation and curettage of uterus (N/A, 2019);  section (N/A, 2020); and laparoscopy (N/A, 2022). Social History:  reports that she has never smoked.  She has never used smokeless tobacco. She reports that she does not drink alcohol and does not use drugs. Family History: family history is not on file. The patients home medications have been reviewed. Allergies: Pcn [penicillins]    -------------------------------------------------- RESULTS -------------------------------------------------  Labs:  No results found for this visit on 10/25/22. Radiology:  No orders to display       ------------------------- NURSING NOTES AND VITALS REVIEWED ---------------------------  Date / Time Roomed:  10/25/2022  2:01 PM  ED Bed Assignment:  03/03    The nursing notes within the ED encounter and vital signs as below have been reviewed. /62   Pulse 80   Temp 97 °F (36.1 °C) (Temporal)   Resp 18   Ht 5' 6\" (1.676 m)   Wt 220 lb (99.8 kg)   LMP 06/07/2022 (Within Weeks)   SpO2 99%   BMI 35.51 kg/m²   Oxygen Saturation Interpretation: Normal      --------------------------------- ADDITIONAL PROVIDER NOTES ---------------------------------  At this time the patient is without objective evidence of an acute process requiring hospitalization or inpatient management. They have remained hemodynamically stable throughout their entire ED visit and are stable for discharge with outpatient follow-up. The plan has been discussed in detail and they are aware of the specific conditions for emergent return, as well as the importance of follow-up. Discharge Medication List as of 10/25/2022  2:43 PM        START taking these medications    Details   ofloxacin (FLOXIN OTIC) 0.3 % otic solution Place 10 drops into the left ear daily for 7 days, Disp-3.5 mL, R-0Normal      cefdinir (OMNICEF) 300 MG capsule Take 1 capsule by mouth 2 times daily for 7 days, Disp-14 capsule, R-0Normal             Diagnosis:  1. Acute otitis externa of left ear, unspecified type        Disposition:  Patient's disposition: Discharge to home  Patient's condition is stable.        Malvin Vidal, DO  Resident  10/25/22 [id number]

## 2023-01-02 ENCOUNTER — HOSPITAL ENCOUNTER (OUTPATIENT)
Age: 28
Discharge: HOME | End: 2023-01-02
Attending: LEGAL MEDICINE | Admitting: LEGAL MEDICINE
Payer: COMMERCIAL

## 2023-01-02 VITALS
HEART RATE: 97 BPM | OXYGEN SATURATION: 99 % | SYSTOLIC BLOOD PRESSURE: 103 MMHG | TEMPERATURE: 98.3 F | DIASTOLIC BLOOD PRESSURE: 57 MMHG | RESPIRATION RATE: 16 BRPM

## 2023-01-02 PROCEDURE — 99202 OFFICE O/P NEW SF 15 MIN: CPT

## 2023-01-03 NOTE — PROGRESS NOTES
Patient arrived to unit for complaints of decreased fetal movement. Patient taken to room , oriented. Patient stated she has felt baby kick but not move. EFM applied, positive fetal movement perceived by patient, and audible per RN, EFM monitored, vitals obtained.

## 2023-01-03 NOTE — H&P
Patient is a  32     year old female who presents on   1/2/23             with complaints of  decreased fetal movement for 1 day. For her past medical, surgical, gyn, family history please refer to ACOG forms A and B. Review of Systems : Negative for cardiac,  respiratory,  GI,  ,  neurologic,  psychiatric,  ENT,  integument,  hematologic,  lymphatic,  constitutional abnormalies    Physical Exam:  VSS  afebrile        FHT's 140 with positive beat to beat variability             No contractions        A: Intrauterine pregnancy at  30 1/2         weeks with complaints of decreased fetal movement. P:  Will monitor.

## 2023-01-03 NOTE — DISCHARGE INSTRUCTIONS
Home Undelivered Discharge Instructions    After Discharge Orders:    Keep all appointments with Dr. Amelie Del Valle:  normal diet as tolerated    Rest: normal activity as tolerated    Other instructions: Do kick counts once a day on your baby. Choose the time of day your baby is most active. Get in a comfortable lying or sitting position and time how long it takes to feel 10 kicks, twists, turns, swishes, or rolls.  Call your physician or midwife if there have not been 10 kicks in 1 hours    Call physician or midwife, return to Labor and Delivery, call 911, or go to the nearest Emergency Room if: increased leakage or fluid, contractions more than  5 per  1 hour, decreased fetal movement, persistent low back pain or cramping, bleeding from vaginal area, difficulty urinating, pain with urination, difficulty breathing, new calf pain, persistent headache, or vision change

## 2023-01-03 NOTE — PROGRESS NOTES
Dr. Lindsey Tucker, updated on patient.  Continue monitoring for 30 minutes and call with fetal heart rate and call back

## 2023-01-03 NOTE — DISCHARGE SUMMARY
ADMITTING DIAGNOSIS: Intrauterine pregnancy at    30 1/2          weeks with decreased fetal movement        DISCHARGE DIAGNOSIS: SAME, as well as no evidence of maternal or fetal compromise    HOSPITAL COURSE IN DETAIL:  She felt the baby move  Fetal heart rate tracing remained reactive for 1 hour  She had no further complaints and felt ready to go home. She was stable. She was sent home with precautions regarding:        Fetal movement          She is to keep her follow up appointment at the office. She indicated understanding of all instructions.

## 2023-01-11 ENCOUNTER — APPOINTMENT (OUTPATIENT)
Dept: LABOR AND DELIVERY | Age: 28
End: 2023-01-11
Payer: COMMERCIAL

## 2023-01-11 ENCOUNTER — HOSPITAL ENCOUNTER (OUTPATIENT)
Age: 28
Discharge: HOME | End: 2023-01-11
Attending: LEGAL MEDICINE | Admitting: LEGAL MEDICINE
Payer: COMMERCIAL

## 2023-01-11 VITALS
SYSTOLIC BLOOD PRESSURE: 112 MMHG | OXYGEN SATURATION: 100 % | TEMPERATURE: 98.1 F | HEART RATE: 84 BPM | RESPIRATION RATE: 18 BRPM | DIASTOLIC BLOOD PRESSURE: 64 MMHG

## 2023-01-11 PROBLEM — Z36.89 NST (NON-STRESS TEST) REACTIVE: Status: ACTIVE | Noted: 2023-01-11

## 2023-01-11 PROCEDURE — 59025 FETAL NON-STRESS TEST: CPT

## 2023-01-11 NOTE — PROGRESS NOTES
31 3/7. Patient arrived for scheduled NST for GDM. Patient denies bleeding, leaking and cx. Patient perceives fetal movement.

## 2023-01-14 ENCOUNTER — APPOINTMENT (OUTPATIENT)
Dept: LABOR AND DELIVERY | Age: 28
End: 2023-01-14
Payer: COMMERCIAL

## 2023-01-14 ENCOUNTER — HOSPITAL ENCOUNTER (OUTPATIENT)
Age: 28
Discharge: HOME | End: 2023-01-14
Attending: LEGAL MEDICINE | Admitting: LEGAL MEDICINE
Payer: COMMERCIAL

## 2023-01-14 VITALS
HEART RATE: 92 BPM | SYSTOLIC BLOOD PRESSURE: 104 MMHG | TEMPERATURE: 98 F | DIASTOLIC BLOOD PRESSURE: 54 MMHG | RESPIRATION RATE: 17 BRPM

## 2023-01-14 PROBLEM — Z36.89 NST (NON-STRESS TEST) REACTIVE ON FETAL SURVEILLANCE: Status: ACTIVE | Noted: 2023-01-14

## 2023-01-14 PROBLEM — O28.8 NON-REACTIVE NST (NON-STRESS TEST): Status: ACTIVE | Noted: 2023-01-14

## 2023-01-14 PROCEDURE — 59025 FETAL NON-STRESS TEST: CPT

## 2023-01-14 NOTE — PROGRESS NOTES
31.6weeks  Pt arrived for scheduled NST for GDM. Denies any leakage of fluid/bleeding or contractions, but has a few christine clifton. Pt perceives fetal movement. EFM/TOCO placed and call light in reach.

## 2023-01-18 ENCOUNTER — HOSPITAL ENCOUNTER (OUTPATIENT)
Age: 28
Setting detail: OBSERVATION
Discharge: HOME | End: 2023-01-19
Attending: LEGAL MEDICINE | Admitting: LEGAL MEDICINE
Payer: COMMERCIAL

## 2023-01-18 PROBLEM — O26.93 COMPLICATED PREGNANCY, THIRD TRIMESTER: Status: ACTIVE | Noted: 2023-01-18

## 2023-01-18 LAB
BACTERIA: ABNORMAL /HPF
BILIRUBIN URINE: NEGATIVE
BLOOD, URINE: NEGATIVE
CLARITY: CLEAR
COLOR: YELLOW
GLUCOSE URINE: NEGATIVE MG/DL
KETONES, URINE: NEGATIVE MG/DL
LEUKOCYTE ESTERASE, URINE: ABNORMAL
METER GLUCOSE: 93 MG/DL (ref 74–99)
NITRITE, URINE: NEGATIVE
PH UA: 6.5 (ref 5–9)
PROTEIN UA: NEGATIVE MG/DL
RBC UA: ABNORMAL /HPF (ref 0–2)
SPECIFIC GRAVITY UA: 1.02 (ref 1–1.03)
UROBILINOGEN, URINE: 0.2 E.U./DL
WBC UA: ABNORMAL /HPF (ref 0–5)

## 2023-01-18 PROCEDURE — 82962 GLUCOSE BLOOD TEST: CPT

## 2023-01-18 PROCEDURE — G0378 HOSPITAL OBSERVATION PER HR: HCPCS

## 2023-01-18 PROCEDURE — 6370000000 HC RX 637 (ALT 250 FOR IP): Performed by: LEGAL MEDICINE

## 2023-01-18 PROCEDURE — 81001 URINALYSIS AUTO W/SCOPE: CPT

## 2023-01-18 RX ORDER — ACETAMINOPHEN 500 MG
1000 TABLET ORAL EVERY 6 HOURS PRN
Status: DISCONTINUED | OUTPATIENT
Start: 2023-01-18 | End: 2023-01-19 | Stop reason: HOSPADM

## 2023-01-18 RX ADMIN — ACETAMINOPHEN 1000 MG: 500 TABLET, FILM COATED ORAL at 23:01

## 2023-01-18 ASSESSMENT — PAIN DESCRIPTION - DESCRIPTORS: DESCRIPTORS: THROBBING

## 2023-01-18 ASSESSMENT — PAIN - FUNCTIONAL ASSESSMENT: PAIN_FUNCTIONAL_ASSESSMENT: ACTIVITIES ARE NOT PREVENTED

## 2023-01-18 ASSESSMENT — PAIN SCALES - GENERAL: PAINLEVEL_OUTOF10: 6

## 2023-01-18 ASSESSMENT — PAIN DESCRIPTION - LOCATION: LOCATION: ABDOMEN;HEAD

## 2023-01-19 VITALS
TEMPERATURE: 98.2 F | OXYGEN SATURATION: 100 % | SYSTOLIC BLOOD PRESSURE: 105 MMHG | DIASTOLIC BLOOD PRESSURE: 59 MMHG | RESPIRATION RATE: 18 BRPM | HEART RATE: 91 BPM

## 2023-01-19 LAB — METER GLUCOSE: 103 MG/DL (ref 74–99)

## 2023-01-19 PROCEDURE — 99211 OFF/OP EST MAY X REQ PHY/QHP: CPT

## 2023-01-19 PROCEDURE — G0378 HOSPITAL OBSERVATION PER HR: HCPCS

## 2023-01-19 PROCEDURE — 82962 GLUCOSE BLOOD TEST: CPT
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
## 2023-01-19 NOTE — PROGRESS NOTES
2124-2128 FHT deceleration noted on EFM, pt changing positions during deceleration. RN to bedside and positioned pt on left side, adjusted EFM.

## 2023-01-19 NOTE — PROGRESS NOTES
RN to bedside at this time to remove EFM per intermittent order. Pt aware if any change in condition to notify nursing for monitor to be reapplied.

## 2023-01-19 NOTE — PROGRESS NOTES
Mary Ann So called and notified on pt arrival and complaints. Reviewed reactive EFM tracing with no  contractions, VS and pt pain level.  See new orders for UA and Vag exam.
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
## 2023-01-19 NOTE — PROGRESS NOTES
Hiren Ospina called and updated on urine results and SVE. Informed Dr. Comfort Sanchez deceleration with return to baseline 135 with moderate variability and accelerations. New orders for pt to stay observation overnight, and involve social work tomorrow due to home situation.  spoke with pt to discuss POC and she is in agreement.

## 2023-01-19 NOTE — PROGRESS NOTES
Discharge instructions on  labor and when to return to hospital given. Pt left unit ambulatory and obtained ride with cousin.

## 2023-01-26 ENCOUNTER — HOSPITAL ENCOUNTER (OUTPATIENT)
Age: 28
Discharge: HOME | End: 2023-01-26
Attending: LEGAL MEDICINE | Admitting: LEGAL MEDICINE
Payer: COMMERCIAL

## 2023-01-26 ENCOUNTER — APPOINTMENT (OUTPATIENT)
Dept: LABOR AND DELIVERY | Age: 28
End: 2023-01-26
Payer: COMMERCIAL

## 2023-01-26 VITALS
DIASTOLIC BLOOD PRESSURE: 63 MMHG | HEART RATE: 95 BPM | RESPIRATION RATE: 17 BRPM | TEMPERATURE: 97.7 F | SYSTOLIC BLOOD PRESSURE: 116 MMHG

## 2023-01-26 PROBLEM — Z3A.33 33 WEEKS GESTATION OF PREGNANCY: Status: ACTIVE | Noted: 2023-01-26

## 2023-01-26 PROCEDURE — 59025 FETAL NON-STRESS TEST: CPT

## 2023-01-26 NOTE — PROGRESS NOTES
G 4 P 3   33.4 week   Pt of Dr. Tennille Carvalho  Arrived ambulatory to unit for scheduled NST due to GDM  Pt denies any bleeding or leaking of fluid, denies contractions. Pt stated that she perceives fetal movement and has not had any other complications with pregnancy. Test discussed with pt who verbalized understanding.

## 2023-01-29 ENCOUNTER — APPOINTMENT (OUTPATIENT)
Dept: ULTRASOUND IMAGING | Age: 28
End: 2023-01-29
Payer: COMMERCIAL

## 2023-01-29 ENCOUNTER — HOSPITAL ENCOUNTER (OUTPATIENT)
Age: 28
Setting detail: OBSERVATION
Discharge: HOME | End: 2023-01-31
Attending: LEGAL MEDICINE | Admitting: LEGAL MEDICINE
Payer: COMMERCIAL

## 2023-01-29 ENCOUNTER — APPOINTMENT (OUTPATIENT)
Dept: LABOR AND DELIVERY | Age: 28
End: 2023-01-29
Payer: COMMERCIAL

## 2023-01-29 PROBLEM — R10.9 ABDOMINAL PAIN AFFECTING PREGNANCY: Status: ACTIVE | Noted: 2023-01-29

## 2023-01-29 PROBLEM — O26.899 ABDOMINAL PAIN AFFECTING PREGNANCY: Status: ACTIVE | Noted: 2023-01-29

## 2023-01-29 LAB
ALBUMIN SERPL-MCNC: 3.8 G/DL (ref 3.5–5.2)
ALP BLD-CCNC: 83 U/L (ref 35–104)
ALT SERPL-CCNC: 10 U/L (ref 0–32)
AMPHETAMINE SCREEN, URINE: NOT DETECTED
ANION GAP SERPL CALCULATED.3IONS-SCNC: 12 MMOL/L (ref 7–16)
AST SERPL-CCNC: 18 U/L (ref 0–31)
BACTERIA: ABNORMAL /HPF
BARBITURATE SCREEN URINE: NOT DETECTED
BENZODIAZEPINE SCREEN, URINE: NOT DETECTED
BILIRUB SERPL-MCNC: 0.5 MG/DL (ref 0–1.2)
BILIRUBIN URINE: NEGATIVE
BLOOD, URINE: NEGATIVE
BUN BLDV-MCNC: 9 MG/DL (ref 6–20)
CALCIUM SERPL-MCNC: 9 MG/DL (ref 8.6–10.2)
CANNABINOID SCREEN URINE: NOT DETECTED
CHLORIDE BLD-SCNC: 98 MMOL/L (ref 98–107)
CLARITY: ABNORMAL
CO2: 21 MMOL/L (ref 22–29)
COCAINE METABOLITE SCREEN URINE: NOT DETECTED
COLOR: YELLOW
CREAT SERPL-MCNC: 0.5 MG/DL (ref 0.5–1)
EPITHELIAL CELLS, UA: ABNORMAL /HPF
FENTANYL SCREEN, URINE: NOT DETECTED
GFR SERPL CREATININE-BSD FRML MDRD: >60 ML/MIN/1.73
GLUCOSE BLD-MCNC: 118 MG/DL (ref 74–99)
GLUCOSE URINE: NEGATIVE MG/DL
HCT VFR BLD CALC: 30 % (ref 34–48)
HEMOGLOBIN: 9.1 G/DL (ref 11.5–15.5)
KETONES, URINE: >=80 MG/DL
LEUKOCYTE ESTERASE, URINE: ABNORMAL
Lab: NORMAL
MCH RBC QN AUTO: 25.1 PG (ref 26–35)
MCHC RBC AUTO-ENTMCNC: 30.3 % (ref 32–34.5)
MCV RBC AUTO: 82.6 FL (ref 80–99.9)
METER GLUCOSE: 93 MG/DL (ref 74–99)
METHADONE SCREEN, URINE: NOT DETECTED
NITRITE, URINE: NEGATIVE
OPIATE SCREEN URINE: NOT DETECTED
OXYCODONE URINE: NOT DETECTED
PDW BLD-RTO: 16.4 FL (ref 11.5–15)
PH UA: 6.5 (ref 5–9)
PHENCYCLIDINE SCREEN URINE: NOT DETECTED
PLATELET # BLD: 235 E9/L (ref 130–450)
PMV BLD AUTO: 10.4 FL (ref 7–12)
POTASSIUM SERPL-SCNC: 3.6 MMOL/L (ref 3.5–5)
PROTEIN UA: ABNORMAL MG/DL
RBC # BLD: 3.63 E12/L (ref 3.5–5.5)
RBC UA: ABNORMAL /HPF (ref 0–2)
SODIUM BLD-SCNC: 131 MMOL/L (ref 132–146)
SPECIFIC GRAVITY UA: >=1.03 (ref 1–1.03)
TOTAL PROTEIN: 6.7 G/DL (ref 6.4–8.3)
UROBILINOGEN, URINE: 0.2 E.U./DL
WBC # BLD: 8.9 E9/L (ref 4.5–11.5)
WBC UA: >20 /HPF (ref 0–5)

## 2023-01-29 PROCEDURE — 36415 COLL VENOUS BLD VENIPUNCTURE: CPT

## 2023-01-29 PROCEDURE — 82962 GLUCOSE BLOOD TEST: CPT

## 2023-01-29 PROCEDURE — G0378 HOSPITAL OBSERVATION PER HR: HCPCS

## 2023-01-29 PROCEDURE — 76770 US EXAM ABDO BACK WALL COMP: CPT

## 2023-01-29 PROCEDURE — 59025 FETAL NON-STRESS TEST: CPT

## 2023-01-29 PROCEDURE — 81001 URINALYSIS AUTO W/SCOPE: CPT

## 2023-01-29 PROCEDURE — 2580000003 HC RX 258: Performed by: LEGAL MEDICINE

## 2023-01-29 PROCEDURE — 80307 DRUG TEST PRSMV CHEM ANLYZR: CPT

## 2023-01-29 PROCEDURE — 6370000000 HC RX 637 (ALT 250 FOR IP): Performed by: LEGAL MEDICINE

## 2023-01-29 PROCEDURE — 80053 COMPREHEN METABOLIC PANEL: CPT

## 2023-01-29 PROCEDURE — 85027 COMPLETE CBC AUTOMATED: CPT

## 2023-01-29 PROCEDURE — 76819 FETAL BIOPHYS PROFIL W/O NST: CPT

## 2023-01-29 RX ORDER — ONDANSETRON 2 MG/ML
4 INJECTION INTRAMUSCULAR; INTRAVENOUS EVERY 6 HOURS PRN
Status: DISCONTINUED | OUTPATIENT
Start: 2023-01-29 | End: 2023-01-31 | Stop reason: HOSPADM

## 2023-01-29 RX ORDER — ACETAMINOPHEN 500 MG
1000 TABLET ORAL EVERY 6 HOURS
Status: DISCONTINUED | OUTPATIENT
Start: 2023-01-29 | End: 2023-01-30

## 2023-01-29 RX ORDER — ACETAMINOPHEN 325 MG/1
650 TABLET ORAL EVERY 4 HOURS PRN
Status: DISCONTINUED | OUTPATIENT
Start: 2023-01-29 | End: 2023-01-31 | Stop reason: HOSPADM

## 2023-01-29 RX ORDER — SODIUM CHLORIDE, SODIUM LACTATE, POTASSIUM CHLORIDE, CALCIUM CHLORIDE 600; 310; 30; 20 MG/100ML; MG/100ML; MG/100ML; MG/100ML
INJECTION, SOLUTION INTRAVENOUS CONTINUOUS
Status: DISCONTINUED | OUTPATIENT
Start: 2023-01-29 | End: 2023-01-30

## 2023-01-29 RX ORDER — TRAMADOL HYDROCHLORIDE 50 MG/1
50 TABLET ORAL EVERY 6 HOURS PRN
Status: DISCONTINUED | OUTPATIENT
Start: 2023-01-29 | End: 2023-01-30

## 2023-01-29 RX ORDER — ONDANSETRON 4 MG/1
4 TABLET, ORALLY DISINTEGRATING ORAL EVERY 8 HOURS PRN
Status: DISCONTINUED | OUTPATIENT
Start: 2023-01-29 | End: 2023-01-31 | Stop reason: HOSPADM

## 2023-01-29 RX ADMIN — ACETAMINOPHEN 1000 MG: 500 TABLET, FILM COATED ORAL at 19:40

## 2023-01-29 RX ADMIN — TRAMADOL HYDROCHLORIDE 50 MG: 50 TABLET, COATED ORAL at 21:21

## 2023-01-29 RX ADMIN — SODIUM CHLORIDE, POTASSIUM CHLORIDE, SODIUM LACTATE AND CALCIUM CHLORIDE: 600; 310; 30; 20 INJECTION, SOLUTION INTRAVENOUS at 23:50

## 2023-01-29 ASSESSMENT — PAIN DESCRIPTION - DESCRIPTORS
DESCRIPTORS: SHARP
DESCRIPTORS: ACHING;DISCOMFORT

## 2023-01-29 ASSESSMENT — PAIN DESCRIPTION - LOCATION
LOCATION: BACK
LOCATION: BACK;RIB CAGE

## 2023-01-29 ASSESSMENT — PAIN SCALES - GENERAL
PAINLEVEL_OUTOF10: 8

## 2023-01-29 ASSESSMENT — PAIN - FUNCTIONAL ASSESSMENT
PAIN_FUNCTIONAL_ASSESSMENT: ACTIVITIES ARE NOT PREVENTED
PAIN_FUNCTIONAL_ASSESSMENT: ACTIVITIES ARE NOT PREVENTED
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
## 2023-01-29 NOTE — PROGRESS NOTES
Pt transferred to ultrasound at [phone] via wheelchair.  Pt returned to unit via wheelchair at [phone]

## 2023-01-29 NOTE — PROGRESS NOTES
RN updated Dr Vikki Alaniz on pt's arrival and complaints. Orders received for BPP w/WAYNE, UA, and UDS. Pt ok to eat, 3carb diet. Check BS before meals. Dr Vikki Alaniz also wants a [address] before pt is discharged.

## 2023-01-30 LAB
METER GLUCOSE: 109 MG/DL (ref 74–99)
METER GLUCOSE: 137 MG/DL (ref 74–99)
METER GLUCOSE: 149 MG/DL (ref 74–99)

## 2023-01-30 PROCEDURE — 6370000000 HC RX 637 (ALT 250 FOR IP): Performed by: LEGAL MEDICINE

## 2023-01-30 PROCEDURE — G0378 HOSPITAL OBSERVATION PER HR: HCPCS

## 2023-01-30 PROCEDURE — 96374 THER/PROPH/DIAG INJ IV PUSH: CPT

## 2023-01-30 PROCEDURE — 2580000003 HC RX 258: Performed by: LEGAL MEDICINE

## 2023-01-30 PROCEDURE — 82962 GLUCOSE BLOOD TEST: CPT

## 2023-01-30 PROCEDURE — 96361 HYDRATE IV INFUSION ADD-ON: CPT

## 2023-01-30 PROCEDURE — 96376 TX/PRO/DX INJ SAME DRUG ADON: CPT

## 2023-01-30 PROCEDURE — 97530 THERAPEUTIC ACTIVITIES: CPT | Performed by: PHYSICAL THERAPIST

## 2023-01-30 PROCEDURE — 96360 HYDRATION IV INFUSION INIT: CPT

## 2023-01-30 PROCEDURE — 97161 PT EVAL LOW COMPLEX 20 MIN: CPT | Performed by: PHYSICAL THERAPIST

## 2023-01-30 PROCEDURE — 6360000002 HC RX W HCPCS: Performed by: LEGAL MEDICINE

## 2023-01-30 RX ORDER — OXYCODONE HYDROCHLORIDE 5 MG/1
5 TABLET ORAL EVERY 6 HOURS PRN
Status: DISCONTINUED | OUTPATIENT
Start: 2023-01-30 | End: 2023-01-31 | Stop reason: HOSPADM

## 2023-01-30 RX ADMIN — OXYCODONE HYDROCHLORIDE 5 MG: 5 TABLET ORAL at 18:35

## 2023-01-30 RX ADMIN — SODIUM CHLORIDE, POTASSIUM CHLORIDE, SODIUM LACTATE AND CALCIUM CHLORIDE: 600; 310; 30; 20 INJECTION, SOLUTION INTRAVENOUS at 14:59

## 2023-01-30 RX ADMIN — HYDROMORPHONE HYDROCHLORIDE 0.5 MG: 1 INJECTION, SOLUTION INTRAMUSCULAR; INTRAVENOUS; SUBCUTANEOUS at 07:29

## 2023-01-30 RX ADMIN — HYDROMORPHONE HYDROCHLORIDE 0.25 MG: 1 INJECTION, SOLUTION INTRAMUSCULAR; INTRAVENOUS; SUBCUTANEOUS at 13:14

## 2023-01-30 RX ADMIN — HYDROMORPHONE HYDROCHLORIDE 0.5 MG: 1 INJECTION, SOLUTION INTRAMUSCULAR; INTRAVENOUS; SUBCUTANEOUS at 00:48

## 2023-01-30 RX ADMIN — ACETAMINOPHEN 650 MG: 325 TABLET ORAL at 15:30

## 2023-01-30 RX ADMIN — ACETAMINOPHEN 650 MG: 325 TABLET ORAL at 21:16

## 2023-01-30 RX ADMIN — ACETAMINOPHEN 650 MG: 325 TABLET ORAL at 10:19

## 2023-01-30 RX ADMIN — ACETAMINOPHEN 1000 MG: 500 TABLET, FILM COATED ORAL at 03:56

## 2023-01-30 ASSESSMENT — PAIN - FUNCTIONAL ASSESSMENT
PAIN_FUNCTIONAL_ASSESSMENT: ACTIVITIES ARE NOT PREVENTED

## 2023-01-30 ASSESSMENT — PAIN DESCRIPTION - DESCRIPTORS
DESCRIPTORS: ACHING
DESCRIPTORS: SHARP;ACHING
DESCRIPTORS: ACHING
DESCRIPTORS: SHARP;SHOOTING
DESCRIPTORS: ACHING

## 2023-01-30 ASSESSMENT — PAIN DESCRIPTION - LOCATION
LOCATION: BACK

## 2023-01-30 ASSESSMENT — PAIN DESCRIPTION - ORIENTATION
ORIENTATION: LEFT
ORIENTATION: LEFT
ORIENTATION: LEFT;LOWER

## 2023-01-30 ASSESSMENT — PAIN SCALES - GENERAL
PAINLEVEL_OUTOF10: 8
PAINLEVEL_OUTOF10: 10
PAINLEVEL_OUTOF10: 7
PAINLEVEL_OUTOF10: 9
PAINLEVEL_OUTOF10: 5
PAINLEVEL_OUTOF10: 10
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
## 2023-01-30 NOTE — PROGRESS NOTES
EFM not tracing during maternal positioning of sleep. Informed patient that EFM ordered and patient refusing adjustment during sleep at this time.

## 2023-01-30 NOTE — PROGRESS NOTES
Physical therapy department notified  via phone call of consult,  physical therapy department  states received the order and will evaluate patient

## 2023-01-30 NOTE — PROGRESS NOTES
Care assumed at bedside and POC reviewed. Patient just eating lunch tray at this time. States she slept through lunch as the medicine made her very tired. Patient updated of new medication changes and starting of metformin. Reminded of needed BS prior to next meal.  Patient states most likely will not be eating til much later. Vitals/assessment obtained. Monitor applied for 30 minutes every shift. Call light within reach. Patient medicated with tylenol for back pain. Using  heating pad to back.  Provided ice chips per request.
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
## 2023-01-30 NOTE — PROGRESS NOTES
Dr Maverick Velez notified that patient is screaming and moaning from room. Patient states her upper, left side of back is hurting. Patient able to move and rolls to side. Patient states the side is tender to the touch and does not allow RN to assess. Patient states the pain medication is making the pain worse and she is uncomfortable. Dr Rian Junior transferred to room via phone to speak with patient. Dr Rian Junior called to unit and new orders received.
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
## 2023-01-30 NOTE — PROGRESS NOTES
Written and verbal discharge instructions reviewed with pt. Pt verbalized understanding. No further questions at this time.     Pt discharged from unit in stable condition.

## 2023-01-30 NOTE — PROGRESS NOTES
Patient sitting up in bed, moaning her back hurts, heating pad to back, patient aware that  not time for next dose of pain medication
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
## 2023-01-30 NOTE — PLAN OF CARE
Problem: Pain  Goal: Verbalizes/displays adequate comfort level or baseline comfort level  Recent Flowsheet Documentation  Taken 1/30/2023 0820 by Jack Lombard, RN  Verbalizes/displays adequate comfort level or baseline comfort level:   Encourage patient to monitor pain and request assistance   Assess pain using appropriate pain scale   Administer analgesics based on type and severity of pain and evaluate response

## 2023-01-31 VITALS
DIASTOLIC BLOOD PRESSURE: 63 MMHG | WEIGHT: 223 LBS | HEART RATE: 92 BPM | TEMPERATURE: 98.5 F | OXYGEN SATURATION: 96 % | SYSTOLIC BLOOD PRESSURE: 127 MMHG | HEIGHT: 67 IN | RESPIRATION RATE: 10 BRPM | BODY MASS INDEX: 35 KG/M2

## 2023-01-31 LAB
HBA1C MFR BLD: 5.3 % (ref 4–5.6)
METER GLUCOSE: 111 MG/DL (ref 74–99)
METER GLUCOSE: 117 MG/DL (ref 74–99)

## 2023-01-31 PROCEDURE — 82962 GLUCOSE BLOOD TEST: CPT

## 2023-01-31 PROCEDURE — 6370000000 HC RX 637 (ALT 250 FOR IP): Performed by: LEGAL MEDICINE

## 2023-01-31 PROCEDURE — 83036 HEMOGLOBIN GLYCOSYLATED A1C: CPT

## 2023-01-31 PROCEDURE — G0378 HOSPITAL OBSERVATION PER HR: HCPCS

## 2023-01-31 PROCEDURE — 36415 COLL VENOUS BLD VENIPUNCTURE: CPT

## 2023-01-31 RX ORDER — HYDROXYZINE PAMOATE 25 MG/1
25 CAPSULE ORAL 3 TIMES DAILY PRN
Status: DISCONTINUED | OUTPATIENT
Start: 2023-01-31 | End: 2023-01-31 | Stop reason: HOSPADM

## 2023-01-31 RX ADMIN — METFORMIN HYDROCHLORIDE 500 MG: 500 TABLET ORAL at 09:46

## 2023-01-31 RX ADMIN — OXYCODONE HYDROCHLORIDE 5 MG: 5 TABLET ORAL at 13:02

## 2023-01-31 RX ADMIN — OXYCODONE HYDROCHLORIDE 5 MG: 5 TABLET ORAL at 06:25

## 2023-01-31 RX ADMIN — OXYCODONE HYDROCHLORIDE 5 MG: 5 TABLET ORAL at 00:21

## 2023-01-31 RX ADMIN — ACETAMINOPHEN 650 MG: 325 TABLET ORAL at 01:56

## 2023-01-31 RX ADMIN — HYDROXYZINE PAMOATE 25 MG: 25 CAPSULE ORAL at 09:49

## 2023-01-31 RX ADMIN — ACETAMINOPHEN 650 MG: 325 TABLET ORAL at 10:50

## 2023-01-31 ASSESSMENT — PAIN SCALES - GENERAL
PAINLEVEL_OUTOF10: 10
PAINLEVEL_OUTOF10: 7

## 2023-01-31 ASSESSMENT — PAIN - FUNCTIONAL ASSESSMENT
PAIN_FUNCTIONAL_ASSESSMENT: ACTIVITIES ARE NOT PREVENTED
PAIN_FUNCTIONAL_ASSESSMENT: 0-10
PAIN_FUNCTIONAL_ASSESSMENT: 0-10
PAIN_FUNCTIONAL_ASSESSMENT: ACTIVITIES ARE NOT PREVENTED
PAIN_FUNCTIONAL_ASSESSMENT: ACTIVITIES ARE NOT PREVENTED
PAIN_FUNCTIONAL_ASSESSMENT: 0-10

## 2023-01-31 ASSESSMENT — PAIN DESCRIPTION - DESCRIPTORS
DESCRIPTORS: ACHING
DESCRIPTORS: THROBBING
DESCRIPTORS: SHARP
DESCRIPTORS: THROBBING

## 2023-01-31 ASSESSMENT — PAIN DESCRIPTION - ORIENTATION
ORIENTATION: LEFT

## 2023-01-31 ASSESSMENT — PAIN DESCRIPTION - LOCATION
LOCATION: FLANK
LOCATION: BACK
LOCATION: FLANK
LOCATION: BACK
LOCATION: BACK
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
## 2023-01-31 NOTE — PROGRESS NOTES
Dr Meredith in to see patient.  Instructed to pull IV.  A1c ordered for am.  Dr Notified Metformin not given and Blood sugar not checked per patient has not eaten.  Ok to start Metforman in am

## 2023-01-31 NOTE — PROGRESS NOTES
Spoke with Dr. Gracie Locke about pt status, pan, and request for something stronger for pain. Orders received, ortho consult, pain management consult, and viseril.
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
## 2023-01-31 NOTE — PROGRESS NOTES
Discharge instructions given. Verbalizes understanding. Discharge form signed by patient and placed in soft chart. Patient nodding off during education but stated agreed and acknowledged understanding. Patient wheeled down to lobby where her mother is picking her up via  Patient tech aid.

## 2023-01-31 NOTE — PROGRESS NOTES
Spoke with ortho and gave them report report/ background info on the patient. Ortho aware of the consult. Pain management will be in touch once they look into more things on the patient. Pt status and report given to staff.

## 2023-01-31 NOTE — PROGRESS NOTES
Labs per order. Pt refusing EFM to be applied at this time. FHTs spot check via doppler with positive fetal movement perceived by pt. Pt to shower x 4 this shift.

## 2023-01-31 NOTE — PROGRESS NOTES
Patient up ambulating in montalvo. States ready to eat now. Provided  sandwich and instructed to wait for  nurse to check blood sugar first.  Patient also requesting additional medication at this time as tylenol not working.

## 2023-01-31 NOTE — DISCHARGE SUMMARY
ADMITTING DIAGNOSIS: Intrauterine pregnancy at   34 1/2           weeks with back pain and neck pain. DISCHARGE DIAGNOSIS: SAME, as well as musculoskeletal strain    Consults: , physical therapy, orthopedics, pain management. HOSPITAL COURSE IN DETAIL:    She continued to complain of pain on and off, unrelieved with Tylenol or narcotics. No physical abnormality was noted, she was not in labor, and fetal heart rate tracing was reactive. Consult with orthopedics confirmed muscle strain. Physical therapy met with her, and gave her exercises to do at home.  met with her, and will continue to follow up with her. Pain management recommended only Tylenol for pain relief    Findings were reviewed with her. I also spoke with her mother, who was willing to help her at home. She felt ready for discahrge. She was stable. She was sent home with precautions regarding:        Fetal movement         labor          She is to keep her follow up appointment at the office. She indicated understanding of all instructions.
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
## 2023-01-31 NOTE — DISCHARGE INSTRUCTIONS
Home Undelivered Discharge Instructions    After Discharge Orders:    Future Appointments   Date Time Provider [place]   2/1/2023  1:00 PM [facility] NON-STRESS TEST [facility]on   2/5/2023  1:00 PM [facility] NON-STRESS TEST [facility]   2/8/2023  1:00 PM [facility] NON-STRESS TEST [facility]   2/12/2023  1:00 PM [facility] NON-STRESS TEST [facility] [place]   2/15/2023  1:00 PM [facility] L[facility]D NON-STRESS TEST [facility] [employer]       Call physician or midwife's office on *** for instructions. Diet:  normal diet as tolerated    Rest: normal activity as tolerated    Other instructions: Do kick counts once a day on your baby. Choose the time of day your baby is most active. Get in a comfortable lying or sitting position and time how long it takes to feel 10 kicks, twists, turns, swishes, or rolls.  Call your physician or midwife if there have not been 10 kicks in 2 hours    Call physician or midwife, return to Labor and Delivery, call 911, or go to the nearest Emergency Room if: increased leakage or fluid, contractions 5 minutes apart that grow with intensity, decreased fetal movement, persistent low back pain or cramping, bleeding from vaginal area, difficulty urinating, pain with urination, difficulty breathing, new calf pain, persistent headache, or vision change

## 2023-01-31 NOTE — PROGRESS NOTES
Patient seen by ortho, and they suspect musculoskeletal strain. Also spoke with pain management team, and they recommend Tylenol only. Spoke with patient's mother, and she will assist the patient at home. Patient agreeable to discharge.

## 2023-01-31 NOTE — PROGRESS NOTES
Assumed care of pt at this time. Plan of care discussed. Pt wishes to be discharged. FHM attempted to be provided pt refused and would like to wait until her left back decreases 9/10 on the pain scale. Pt perceives that the tylenol is not working for the pain. Pt expressed that the warm showers seem to be the only thing that gives her relief. Plan of care discussed. Pt verbalized understanding. Vitals taken. Call light within reach.  Lights dimmed for comfort and to provide rest

## 2023-01-31 NOTE — PROGRESS NOTES
Spoke with Dr. Steven Villarreal about getting pt permission to talk to pt mother  pt confirmed mothers name is Noah Fox. Pt is ok with all info being released to her mother from Dr. Steven Villarreal. She could not confirm mothers phone number. And could not confirm the year her mother was born but does know the day of birth 9/[birth year].      Mothers phone number is [phone]

## 2023-02-14 ENCOUNTER — HOSPITAL ENCOUNTER (OUTPATIENT)
Age: 28
Setting detail: OBSERVATION
Discharge: HOME | End: 2023-02-15
Attending: LEGAL MEDICINE | Admitting: LEGAL MEDICINE
Payer: COMMERCIAL

## 2023-02-14 LAB
BACTERIA: ABNORMAL /HPF
BILIRUBIN URINE: NEGATIVE
BLOOD, URINE: NEGATIVE
CLARITY: ABNORMAL
COLOR: YELLOW
EPITHELIAL CELLS, UA: ABNORMAL /HPF
GLUCOSE URINE: NEGATIVE MG/DL
KETONES, URINE: NEGATIVE MG/DL
LEUKOCYTE ESTERASE, URINE: ABNORMAL
NITRITE, URINE: NEGATIVE
PH UA: 6.5 (ref 5–9)
PROTEIN UA: NEGATIVE MG/DL
RBC UA: ABNORMAL /HPF (ref 0–2)
SPECIFIC GRAVITY UA: 1.02 (ref 1–1.03)
UROBILINOGEN, URINE: 4 E.U./DL
WBC UA: >20 /HPF (ref 0–5)

## 2023-02-14 PROCEDURE — 6370000000 HC RX 637 (ALT 250 FOR IP): Performed by: LEGAL MEDICINE

## 2023-02-14 PROCEDURE — 81001 URINALYSIS AUTO W/SCOPE: CPT

## 2023-02-14 PROCEDURE — 80307 DRUG TEST PRSMV CHEM ANLYZR: CPT

## 2023-02-14 RX ORDER — ACETAMINOPHEN 325 MG/1
650 TABLET ORAL EVERY 4 HOURS PRN
Status: DISCONTINUED | OUTPATIENT
Start: 2023-02-14 | End: 2023-02-15 | Stop reason: HOSPADM

## 2023-02-14 RX ADMIN — ACETAMINOPHEN 650 MG: 325 TABLET ORAL at 23:29

## 2023-02-14 ASSESSMENT — PAIN SCALES - GENERAL: PAINLEVEL_OUTOF10: 5

## 2023-02-14 ASSESSMENT — PAIN DESCRIPTION - FREQUENCY: FREQUENCY: CONTINUOUS

## 2023-02-14 ASSESSMENT — PAIN - FUNCTIONAL ASSESSMENT: PAIN_FUNCTIONAL_ASSESSMENT: ACTIVITIES ARE NOT PREVENTED

## 2023-02-14 ASSESSMENT — PAIN DESCRIPTION - ORIENTATION: ORIENTATION: MID

## 2023-02-14 ASSESSMENT — PAIN DESCRIPTION - LOCATION: LOCATION: ABDOMEN

## 2023-02-14 ASSESSMENT — PAIN DESCRIPTION - PAIN TYPE: TYPE: ACUTE PAIN

## 2023-02-15 VITALS
TEMPERATURE: 97.6 F | RESPIRATION RATE: 17 BRPM | SYSTOLIC BLOOD PRESSURE: 108 MMHG | DIASTOLIC BLOOD PRESSURE: 61 MMHG | OXYGEN SATURATION: 98 % | HEART RATE: 92 BPM

## 2023-02-15 LAB
AMPHETAMINE SCREEN, URINE: NOT DETECTED
BARBITURATE SCREEN URINE: NOT DETECTED
BENZODIAZEPINE SCREEN, URINE: NOT DETECTED
CANNABINOID SCREEN URINE: NOT DETECTED
COCAINE METABOLITE SCREEN URINE: NOT DETECTED
FENTANYL SCREEN, URINE: NOT DETECTED
Lab: NORMAL
METER GLUCOSE: 83 MG/DL (ref 74–99)
METHADONE SCREEN, URINE: NOT DETECTED
OPIATE SCREEN URINE: NOT DETECTED
OXYCODONE URINE: NOT DETECTED
PHENCYCLIDINE SCREEN URINE: NOT DETECTED

## 2023-02-15 PROCEDURE — 99211 OFF/OP EST MAY X REQ PHY/QHP: CPT

## 2023-02-15 PROCEDURE — G0378 HOSPITAL OBSERVATION PER HR: HCPCS

## 2023-02-15 PROCEDURE — 82962 GLUCOSE BLOOD TEST: CPT

## 2023-02-15 ASSESSMENT — PAIN SCALES - GENERAL: PAINLEVEL_OUTOF10: 0

## 2023-02-15 NOTE — PROGRESS NOTES
Dr David Mann notified of patient admission, fetal tracing, vaginal exam.  Orders received see new orders.

## 2023-02-15 NOTE — PROGRESS NOTES
Dr. Santos Bevels bedside with pt, pt ok for discharge. Pt wishes to est breakfast before she leaves. No other concerns at this time. Pt verbalizes understanding.

## 2023-02-15 NOTE — H&P
Patient is a  32     year old female who presents on  2/14/23              with complaints of cramping after having engaged in intercourse. No leaking fluid, vaginal bleeding, change in fetal movement, change in bowel or urinary habits. No change in fetal movements. For her past medical, surgical, gyn, family history please refer to ACOG forms A and B. Review of Systems : Negative for cardiac,  respiratory,  GI,  ,  neurologic,  psychiatric,  ENT,  integument,  hematologic,  lymphatic,  constitutional abnormalies    Physical Exam:  VSS  afebrile    HEENT: negative  LUNGS: clear to auscultation  CV: regular rate and rhythm  ABDOMEN: gravid soft non-tender  NEURO: CN II-XII grossly intact                 She is alert and oriented times 4. FHT's 140 with positive beat to beat variability             No contractions        A: Intrauterine pregnancy at   36 1/2        weeks with complaints of cramping. P: u/a, monitor.

## 2023-02-15 NOTE — DISCHARGE SUMMARY
ADMITTING DIAGNOSIS: Intrauterine pregnancy at   36 1/2           weeks with cramping        DISCHARGE DIAGNOSIS: SAME, as well as not in [address]:    FBS 83  U/A contaminated  UDS negative. Tylenol helped her pain  Fetal tracing remained reactive, with no contractions  Cervix closed and long. She felt ready to go home. She was stable. She was sent home with precautions regarding:        Fetal movement         labor        She is to keep her follow up appointment at the office. She indicated understanding of all instructions.

## 2023-02-15 NOTE — PROGRESS NOTES
Patient is a 32year old , EDC 2023 36.2 weeks. Patient presented ambulatory to the Labor and delivery department with complaints of abdominal and vaginal pain that has been going on all day but started to get worse around 1900. Patient denies any vaginal bleeding or leakage of fluid and reports normal fetal movement. Patient is a scheduled repeat  section on 2023. Patient taken to triage 3 EFM applied and plan of care discussed.

## 2023-02-15 NOTE — PROGRESS NOTES
Assumed care of pt at this time. Plan of care discussed. Pt verbalized understanding. No other concerns expressed at this time. Call light within reach. Pt is resting comfortably.

## 2023-02-26 ENCOUNTER — HOSPITAL ENCOUNTER (OUTPATIENT)
Age: 28
Setting detail: OBSERVATION
Discharge: HOME | End: 2023-02-27
Attending: LEGAL MEDICINE | Admitting: LEGAL MEDICINE
Payer: COMMERCIAL

## 2023-02-26 PROBLEM — R10.9 ABDOMINAL PAIN: Status: ACTIVE | Noted: 2023-02-26

## 2023-02-26 LAB
ABO/RH: NORMAL
ALBUMIN SERPL-MCNC: 3.5 G/DL (ref 3.5–5.2)
ALP BLD-CCNC: 121 U/L (ref 35–104)
ALT SERPL-CCNC: 7 U/L (ref 0–32)
ANION GAP SERPL CALCULATED.3IONS-SCNC: 12 MMOL/L (ref 7–16)
ANISOCYTOSIS: ABNORMAL
ANTIBODY SCREEN: NORMAL
AST SERPL-CCNC: 15 U/L (ref 0–31)
BACTERIA: ABNORMAL /HPF
BASOPHILS ABSOLUTE: 0 E9/L (ref 0–0.2)
BASOPHILS RELATIVE PERCENT: 0.2 % (ref 0–2)
BILIRUB SERPL-MCNC: 0.5 MG/DL (ref 0–1.2)
BILIRUBIN URINE: NEGATIVE
BLOOD, URINE: NEGATIVE
BUN BLDV-MCNC: 7 MG/DL (ref 6–20)
CALCIUM SERPL-MCNC: 8.5 MG/DL (ref 8.6–10.2)
CHLORIDE BLD-SCNC: 104 MMOL/L (ref 98–107)
CLARITY: ABNORMAL
CO2: 19 MMOL/L (ref 22–29)
COLOR: YELLOW
CREAT SERPL-MCNC: 0.6 MG/DL (ref 0.5–1)
EOSINOPHILS ABSOLUTE: 0.06 E9/L (ref 0.05–0.5)
EOSINOPHILS RELATIVE PERCENT: 0.9 % (ref 0–6)
EPITHELIAL CELLS, UA: ABNORMAL /HPF
GFR SERPL CREATININE-BSD FRML MDRD: >60 ML/MIN/1.73
GLUCOSE BLD-MCNC: 77 MG/DL (ref 74–99)
GLUCOSE URINE: NEGATIVE MG/DL
HCT VFR BLD CALC: 31.5 % (ref 34–48)
HEMOGLOBIN: 9 G/DL (ref 11.5–15.5)
HYPOCHROMIA: ABNORMAL
KETONES, URINE: 40 MG/DL
LEUKOCYTE ESTERASE, URINE: ABNORMAL
LYMPHOCYTES ABSOLUTE: 0.26 E9/L (ref 1.5–4)
LYMPHOCYTES RELATIVE PERCENT: 4.3 % (ref 20–42)
MCH RBC QN AUTO: 23.3 PG (ref 26–35)
MCHC RBC AUTO-ENTMCNC: 28.6 % (ref 32–34.5)
MCV RBC AUTO: 81.6 FL (ref 80–99.9)
MONOCYTES ABSOLUTE: 0.13 E9/L (ref 0.1–0.95)
MONOCYTES RELATIVE PERCENT: 1.7 % (ref 2–12)
NEUTROPHILS ABSOLUTE: 6.05 E9/L (ref 1.8–7.3)
NEUTROPHILS RELATIVE PERCENT: 93 % (ref 43–80)
NITRITE, URINE: NEGATIVE
OVALOCYTES: ABNORMAL
PDW BLD-RTO: 18.1 FL (ref 11.5–15)
PH UA: 6 (ref 5–9)
PLATELET # BLD: 195 E9/L (ref 130–450)
PMV BLD AUTO: 10.6 FL (ref 7–12)
POIKILOCYTES: ABNORMAL
POLYCHROMASIA: ABNORMAL
POTASSIUM SERPL-SCNC: 3.8 MMOL/L (ref 3.5–5)
PROTEIN UA: NEGATIVE MG/DL
RBC # BLD: 3.86 E12/L (ref 3.5–5.5)
RBC UA: ABNORMAL /HPF (ref 0–2)
SODIUM BLD-SCNC: 135 MMOL/L (ref 132–146)
SPECIFIC GRAVITY UA: 1.02 (ref 1–1.03)
TARGET CELLS: ABNORMAL
TOTAL PROTEIN: 6.6 G/DL (ref 6.4–8.3)
UROBILINOGEN, URINE: 0.2 E.U./DL
WBC # BLD: 6.5 E9/L (ref 4.5–11.5)
WBC UA: ABNORMAL /HPF (ref 0–5)

## 2023-02-26 PROCEDURE — 86901 BLOOD TYPING SEROLOGIC RH(D): CPT

## 2023-02-26 PROCEDURE — 2500000003 HC RX 250 WO HCPCS: Performed by: LEGAL MEDICINE

## 2023-02-26 PROCEDURE — 2580000003 HC RX 258: Performed by: LEGAL MEDICINE

## 2023-02-26 PROCEDURE — 86900 BLOOD TYPING SEROLOGIC ABO: CPT

## 2023-02-26 PROCEDURE — 96375 TX/PRO/DX INJ NEW DRUG ADDON: CPT

## 2023-02-26 PROCEDURE — 96376 TX/PRO/DX INJ SAME DRUG ADON: CPT

## 2023-02-26 PROCEDURE — 86850 RBC ANTIBODY SCREEN: CPT

## 2023-02-26 PROCEDURE — 85025 COMPLETE CBC W/AUTO DIFF WBC: CPT

## 2023-02-26 PROCEDURE — 36415 COLL VENOUS BLD VENIPUNCTURE: CPT

## 2023-02-26 PROCEDURE — 81001 URINALYSIS AUTO W/SCOPE: CPT

## 2023-02-26 PROCEDURE — 80053 COMPREHEN METABOLIC PANEL: CPT

## 2023-02-26 PROCEDURE — 96361 HYDRATE IV INFUSION ADD-ON: CPT

## 2023-02-26 PROCEDURE — 96360 HYDRATION IV INFUSION INIT: CPT

## 2023-02-26 PROCEDURE — A4216 STERILE WATER/SALINE, 10 ML: HCPCS | Performed by: LEGAL MEDICINE

## 2023-02-26 PROCEDURE — 96365 THER/PROPH/DIAG IV INF INIT: CPT

## 2023-02-26 PROCEDURE — 83036 HEMOGLOBIN GLYCOSYLATED A1C: CPT

## 2023-02-26 PROCEDURE — 96372 THER/PROPH/DIAG INJ SC/IM: CPT

## 2023-02-26 PROCEDURE — 6360000002 HC RX W HCPCS: Performed by: LEGAL MEDICINE

## 2023-02-26 RX ORDER — METOCLOPRAMIDE HYDROCHLORIDE 5 MG/ML
10 INJECTION INTRAMUSCULAR; INTRAVENOUS EVERY 6 HOURS
Status: DISCONTINUED | OUTPATIENT
Start: 2023-02-26 | End: 2023-02-27 | Stop reason: HOSPADM

## 2023-02-26 RX ORDER — SODIUM CHLORIDE, SODIUM LACTATE, POTASSIUM CHLORIDE, CALCIUM CHLORIDE 600; 310; 30; 20 MG/100ML; MG/100ML; MG/100ML; MG/100ML
INJECTION, SOLUTION INTRAVENOUS CONTINUOUS
Status: DISCONTINUED | OUTPATIENT
Start: 2023-02-26 | End: 2023-02-27 | Stop reason: HOSPADM

## 2023-02-26 RX ADMIN — FAMOTIDINE 20 MG: 10 INJECTION, SOLUTION INTRAVENOUS at 21:05

## 2023-02-26 RX ADMIN — SODIUM CHLORIDE, POTASSIUM CHLORIDE, SODIUM LACTATE AND CALCIUM CHLORIDE: 600; 310; 30; 20 INJECTION, SOLUTION INTRAVENOUS at 18:34

## 2023-02-26 RX ADMIN — SODIUM CHLORIDE 125 MG: 9 INJECTION, SOLUTION INTRAVENOUS at 22:21

## 2023-02-26 RX ADMIN — METOCLOPRAMIDE 10 MG: 5 INJECTION, SOLUTION INTRAMUSCULAR; INTRAVENOUS at 18:33

## 2023-02-26 NOTE — PROGRESS NOTES
Pt arrived to unit by ambulance with complaints of N/V, along with pain under her ribs when she vomits. Pt denies and contractions or leaking of fluid/bleeding and perceives fetal movement. EFM/TOCO applied and call light in reach.

## 2023-02-27 VITALS
RESPIRATION RATE: 14 BRPM | DIASTOLIC BLOOD PRESSURE: 54 MMHG | HEIGHT: 67 IN | WEIGHT: 223 LBS | HEART RATE: 102 BPM | TEMPERATURE: 98.2 F | SYSTOLIC BLOOD PRESSURE: 102 MMHG | OXYGEN SATURATION: 97 % | BODY MASS INDEX: 35 KG/M2

## 2023-02-27 PROCEDURE — A4216 STERILE WATER/SALINE, 10 ML: HCPCS | Performed by: LEGAL MEDICINE

## 2023-02-27 PROCEDURE — 2580000003 HC RX 258: Performed by: LEGAL MEDICINE

## 2023-02-27 PROCEDURE — 99211 OFF/OP EST MAY X REQ PHY/QHP: CPT

## 2023-02-27 PROCEDURE — G0378 HOSPITAL OBSERVATION PER HR: HCPCS

## 2023-02-27 PROCEDURE — 96366 THER/PROPH/DIAG IV INF ADDON: CPT

## 2023-02-27 PROCEDURE — 6360000002 HC RX W HCPCS: Performed by: LEGAL MEDICINE

## 2023-02-27 PROCEDURE — 2500000003 HC RX 250 WO HCPCS: Performed by: LEGAL MEDICINE

## 2023-02-27 RX ADMIN — FAMOTIDINE 20 MG: 10 INJECTION, SOLUTION INTRAVENOUS at 09:55

## 2023-02-27 RX ADMIN — METOCLOPRAMIDE 10 MG: 5 INJECTION, SOLUTION INTRAMUSCULAR; INTRAVENOUS at 06:47

## 2023-02-27 RX ADMIN — METOCLOPRAMIDE 10 MG: 5 INJECTION, SOLUTION INTRAMUSCULAR; INTRAVENOUS at 00:52

## 2023-02-27 RX ADMIN — SODIUM CHLORIDE 125 MG: 9 INJECTION, SOLUTION INTRAVENOUS at 06:47

## 2023-02-27 NOTE — PROGRESS NOTES
Dr Elly Triana called in for update on pt. Reviewed reactive EFM, lab and urine results, and vitals. Orders received for IV iron 125mg now and to repeat this dose in 8 hours. Can do intermittent monitoring for 1 hour unless pt complains of pain, contractions, or decreased fetal movement. RN to bedside to review new orders with the patient and she verbalizes understanding.

## 2023-02-27 NOTE — PROGRESS NOTES
Pt was given crackers and ginger ale at 2230 and was able to keep them down without any nausea. She is sleeping and refusing EFM until later on in the morning. Maternal perception of fetal movement verified. Pt denies any pain or contractions.

## 2023-02-27 NOTE — PROGRESS NOTES
Assuming care of patient at this time. Bedside report received from previous RN. Plan of care discussed with patient.

## 2023-02-27 NOTE — PROGRESS NOTES
Assuming care of pt at this time. RN to bedside. Pt asleep. Audible heart tones heard via ultrasound.

## 2023-02-27 NOTE — PROGRESS NOTES
RN to bedside. POC for the night discussed with pt and she verbalizes agreement. Denies any pain at this time. Audible heart tones heard by RN. Maternal perception of fetal movement verified. Call light in reach. Instructed to call out with needs.

## 2023-02-27 NOTE — DISCHARGE INSTRUCTIONS
Home Undelivered Discharge Instructions    After Discharge Orders:    Keep future appointments. Diet:  normal diet as tolerated    Rest: normal activity as tolerated    Other instructions: Do kick counts once a day on your baby. Choose the time of day your baby is most active. Get in a comfortable lying or sitting position and time how long it takes to feel 10 kicks, twists, turns, swishes, or rolls.  Call your physician or midwife if there have not been 10 kicks in 2 hours    Call physician or midwife, return to Labor and Delivery, call 911, or go to the nearest Emergency Room if: increased leakage or fluid, contractions more than  8 per  1 hour, decreased fetal movement, persistent low back pain or cramping, bleeding from vaginal area, difficulty urinating, pain with urination, difficulty breathing, new calf pain, persistent headache, or vision change

## 2023-02-28 LAB — HBA1C MFR BLD: 5.4 %

## 2023-02-28 NOTE — DISCHARGE SUMMARY
ADMITTING DIAGNOSIS: Intrauterine pregnancy at  38            weeks with nausea and vomiting. DISCHARGE DIAGNOSIS: SAME, as well as acid reflux. HOSPITAL COURSE IN DETAIL:     Yellow     Color, UA                  Clarity, UA             SLCLO. .. Clarity, UA     Glucose, UA             Negat. .. Glucose, UA     Bilirubin, Urine             Negat. .. Bilirubin, Urine     Ketones, Urine             40     Ketones, Urine     Specific Gravity, UA             1.020     Specific Gravity, UA     Blood, Urine             Negat. .. Blood, Urine     pH, UA             6.0     pH, UA     Protein, UA             Negat. .. Protein, UA     Urobilinogen, Urine             0.2     Urobilinogen, Urine     Nitrite, Urine             Negat. .. Nitrite, Urine     Leukocyte Esterase, Urine             MODER. .. Leukocyte Esterase, Urine     UA MICROSCOPIC GRP    WBC, UA             1-3     WBC, UA     RBC, UA             NONE     RBC, UA     Epithelial Cells, UA             RARE     Epithelial Cells, UA     Bacteria, UA             RARE     Bacteria, UA     Leukocyte Esterase, Urine             MODER. ..             77     Glucose, Random     CELL COUNT AND DIFFERENTIATION GROUP    WBC             6.5     WBC     RBC             3.86     RBC     Hemoglobin Quant             9.0     Hemoglobin Quant     Hematocrit             31.5     Hematocrit     MCV             81. 6     MCV     MCH             23. 3     MCH     MCHC             28. 6     MCHC     MPV             10. 6     MPV     RDW             18.1     RDW     Platelet Count             195     Platelet Count     Neutrophils %             93.0     Neutrophils %     Lymphocyte %             4.3     Lymphocyte %     Monocytes %             1.7     Monocytes %     Eosinophils %             0.9     Eosinophils %     Basophils %             0.2     Basophils %     Neutrophils Absolute             6.05     Neutrophils Absolute     Lymphocytes Absolute 0.26     Lymphocytes Absolute     Monocytes Absolute             0.13     Monocytes Absolute     Eosinophils Absolute             0.06     Eosinophils Absolute     Basophils Absolute             0.00     Basophils Absolute     Poikilocytosis             1+     Poikilocytosis     Polychromasia             1+     Polychromasia     Target Cells             1+     Target Cells     Anisocytosis             2+     Anisocytosis     Hypochromia             1+              135     Sodium                  Potassium             3.8     Potassium     Chloride             104     Chloride     CO2             19     CO2     BUN,BUNPL             7     BUN,BUNPL     Creatinine             0.6     Creatinine     Anion Gap             12     Anion Gap     Est, Glom Filt Rate             >60     Est, Glom Filt Rate     Glucose, Random             77     Glucose, Random     CALCIUM, SERUM, [id number]             8.5     CALCIUM, SERUM, [id number]     Total Protein             6.6     Total Protein     GI-LIVER PROFILE GROUP    Albumin             3.5     Albumin     Alk Phos             121     Alk Phos     ALT             7     ALT     AST             15     AST     BILIRUBIN TOTAL             0.5     BILIRUBIN TOTAL     Total Protein             6.6     Total Protein     DIABETES GRP    Glucose, Random             77     Glucose, Random     CELL COUNT AND DIFFERENTIATION GROUP         She slept thru the night and felt better. Tolerated regular diet with no problem. Fetal heart rate tracing remained reactive. Received IV iron due to iron deficiency anemia, with previous C-sections and planned delivery next week. She felt ready to go home. She was stable. She was sent home with precautions regarding:        Fetal movement    Labor          She is to keep her follow up appointment at the office. She indicated understanding of all instructions.

## 2023-02-28 NOTE — H&P
Patient is a  32     year old female who presents on   2/26/23             with complaints of nausea and vomiting for 1 day, with inability to tolerate solids or liquids for 1 day. Passing flatus. Constipated, with last BM 3 days ago. No fever or chills. No contractions. No leaking fluid or vaginal bleeding. No change in fetal movement. Has acid reflux. For her past medical, surgical, gyn, family history please refer to ACOG forms A and B. Review of Systems : Negative for cardiac,  respiratory,    ,  neurologic,  psychiatric,  ENT,  integument,  hematologic,  lymphatic,  constitutional abnormalies    Physical Exam:  VSS  afebrile      FHT's 140 with positive beat to beat variability             Rare contractions        A: Intrauterine pregnancy at   38        weeks with complaints of nausea and vomiting. P:  CBC, CMP, U/A, IVF, antiemetics, monitor.

## 2023-03-04 ENCOUNTER — HOSPITAL ENCOUNTER (INPATIENT)
Age: 28
LOS: 3 days | Discharge: HOME | DRG: 540 | End: 2023-03-08
Attending: LEGAL MEDICINE | Admitting: LEGAL MEDICINE
Payer: COMMERCIAL

## 2023-03-04 LAB
BACTERIA: ABNORMAL /HPF
BILIRUBIN URINE: NEGATIVE
BLOOD, URINE: NEGATIVE
CLARITY: ABNORMAL
COLOR: YELLOW
EPITHELIAL CELLS, UA: ABNORMAL /HPF
GLUCOSE URINE: NEGATIVE MG/DL
KETONES, URINE: NEGATIVE MG/DL
LEUKOCYTE ESTERASE, URINE: ABNORMAL
NITRITE, URINE: NEGATIVE
PH UA: 6 (ref 5–9)
PROTEIN UA: NEGATIVE MG/DL
RBC UA: ABNORMAL /HPF (ref 0–2)
SPECIFIC GRAVITY UA: 1.02 (ref 1–1.03)
UROBILINOGEN, URINE: 0.2 E.U./DL
WBC UA: ABNORMAL /HPF (ref 0–5)

## 2023-03-04 PROCEDURE — 81001 URINALYSIS AUTO W/SCOPE: CPT

## 2023-03-05 ENCOUNTER — ANESTHESIA EVENT (OUTPATIENT)
Dept: LABOR AND DELIVERY | Age: 28
DRG: 540 | End: 2023-03-05
Payer: COMMERCIAL

## 2023-03-05 ENCOUNTER — ANESTHESIA (OUTPATIENT)
Dept: LABOR AND DELIVERY | Age: 28
DRG: 540 | End: 2023-03-05
Payer: COMMERCIAL

## 2023-03-05 LAB
ABO/RH: NORMAL
ALBUMIN SERPL-MCNC: 3.5 G/DL (ref 3.5–5.2)
ALP BLD-CCNC: 120 U/L (ref 35–104)
ALT SERPL-CCNC: 10 U/L (ref 0–32)
AMPHETAMINE SCREEN, URINE: NOT DETECTED
ANION GAP SERPL CALCULATED.3IONS-SCNC: 10 MMOL/L (ref 7–16)
ANTIBODY SCREEN: NORMAL
AST SERPL-CCNC: 17 U/L (ref 0–31)
BARBITURATE SCREEN URINE: NOT DETECTED
BENZODIAZEPINE SCREEN, URINE: NOT DETECTED
BILIRUB SERPL-MCNC: 0.3 MG/DL (ref 0–1.2)
BUN BLDV-MCNC: 10 MG/DL (ref 6–20)
CALCIUM SERPL-MCNC: 8.8 MG/DL (ref 8.6–10.2)
CANNABINOID SCREEN URINE: NOT DETECTED
CHLORIDE BLD-SCNC: 104 MMOL/L (ref 98–107)
CO2: 21 MMOL/L (ref 22–29)
COCAINE METABOLITE SCREEN URINE: NOT DETECTED
CREAT SERPL-MCNC: 0.6 MG/DL (ref 0.5–1)
FENTANYL SCREEN, URINE: NOT DETECTED
GFR SERPL CREATININE-BSD FRML MDRD: >60 ML/MIN/1.73
GLUCOSE BLD-MCNC: 85 MG/DL (ref 74–99)
HCT VFR BLD CALC: 31 % (ref 34–48)
HEMOGLOBIN: 9 G/DL (ref 11.5–15.5)
Lab: NORMAL
MCH RBC QN AUTO: 24.1 PG (ref 26–35)
MCHC RBC AUTO-ENTMCNC: 29 % (ref 32–34.5)
MCV RBC AUTO: 82.9 FL (ref 80–99.9)
METHADONE SCREEN, URINE: NOT DETECTED
OPIATE SCREEN URINE: NOT DETECTED
OXYCODONE URINE: NOT DETECTED
PDW BLD-RTO: 20.5 FL (ref 11.5–15)
PHENCYCLIDINE SCREEN URINE: NOT DETECTED
PLATELET # BLD: 194 E9/L (ref 130–450)
PMV BLD AUTO: 11.1 FL (ref 7–12)
POTASSIUM SERPL-SCNC: 4.3 MMOL/L (ref 3.5–5)
RBC # BLD: 3.74 E12/L (ref 3.5–5.5)
SODIUM BLD-SCNC: 135 MMOL/L (ref 132–146)
TOTAL PROTEIN: 6.4 G/DL (ref 6.4–8.3)
WBC # BLD: 5 E9/L (ref 4.5–11.5)

## 2023-03-05 PROCEDURE — 1220000001 HC SEMI PRIVATE L&D R&B

## 2023-03-05 PROCEDURE — 6370000000 HC RX 637 (ALT 250 FOR IP): Performed by: ANESTHESIOLOGY

## 2023-03-05 PROCEDURE — 80053 COMPREHEN METABOLIC PANEL: CPT

## 2023-03-05 PROCEDURE — 2500000003 HC RX 250 WO HCPCS: Performed by: LEGAL MEDICINE

## 2023-03-05 PROCEDURE — 7100000000 HC PACU RECOVERY - FIRST 15 MIN: Performed by: LEGAL MEDICINE

## 2023-03-05 PROCEDURE — 3700000000 HC ANESTHESIA ATTENDED CARE: Performed by: LEGAL MEDICINE

## 2023-03-05 PROCEDURE — 6360000002 HC RX W HCPCS

## 2023-03-05 PROCEDURE — 2580000003 HC RX 258: Performed by: LEGAL MEDICINE

## 2023-03-05 PROCEDURE — 86850 RBC ANTIBODY SCREEN: CPT

## 2023-03-05 PROCEDURE — 85027 COMPLETE CBC AUTOMATED: CPT

## 2023-03-05 PROCEDURE — 2500000003 HC RX 250 WO HCPCS: Performed by: ANESTHESIOLOGY

## 2023-03-05 PROCEDURE — A4216 STERILE WATER/SALINE, 10 ML: HCPCS | Performed by: LEGAL MEDICINE

## 2023-03-05 PROCEDURE — 2709999900 HC NON-CHARGEABLE SUPPLY: Performed by: LEGAL MEDICINE

## 2023-03-05 PROCEDURE — 6370000000 HC RX 637 (ALT 250 FOR IP): Performed by: LEGAL MEDICINE

## 2023-03-05 PROCEDURE — 3700000001 HC ADD 15 MINUTES (ANESTHESIA): Performed by: LEGAL MEDICINE

## 2023-03-05 PROCEDURE — 3609079900 HC CESAREAN SECTION: Performed by: LEGAL MEDICINE

## 2023-03-05 PROCEDURE — 6360000002 HC RX W HCPCS: Performed by: ANESTHESIOLOGY

## 2023-03-05 PROCEDURE — 80307 DRUG TEST PRSMV CHEM ANLYZR: CPT

## 2023-03-05 PROCEDURE — 7100000001 HC PACU RECOVERY - ADDTL 15 MIN: Performed by: LEGAL MEDICINE

## 2023-03-05 PROCEDURE — 86901 BLOOD TYPING SEROLOGIC RH(D): CPT

## 2023-03-05 PROCEDURE — 6360000002 HC RX W HCPCS: Performed by: LEGAL MEDICINE

## 2023-03-05 PROCEDURE — 36415 COLL VENOUS BLD VENIPUNCTURE: CPT

## 2023-03-05 PROCEDURE — 6360000002 HC RX W HCPCS: Performed by: NURSE ANESTHETIST, CERTIFIED REGISTERED

## 2023-03-05 PROCEDURE — 86900 BLOOD TYPING SEROLOGIC ABO: CPT

## 2023-03-05 RX ORDER — MORPHINE SULFATE 1 MG/ML
INJECTION, SOLUTION EPIDURAL; INTRATHECAL; INTRAVENOUS PRN
Status: DISCONTINUED | OUTPATIENT
Start: 2023-03-05 | End: 2023-03-05 | Stop reason: SDUPTHER

## 2023-03-05 RX ORDER — SODIUM CHLORIDE 0.9 % (FLUSH) 0.9 %
10 SYRINGE (ML) INJECTION EVERY 12 HOURS SCHEDULED
Status: DISCONTINUED | OUTPATIENT
Start: 2023-03-05 | End: 2023-03-05

## 2023-03-05 RX ORDER — NALOXONE HYDROCHLORIDE 0.4 MG/ML
0.4 INJECTION, SOLUTION INTRAMUSCULAR; INTRAVENOUS; SUBCUTANEOUS PRN
Status: DISCONTINUED | OUTPATIENT
Start: 2023-03-05 | End: 2023-03-08 | Stop reason: HOSPADM

## 2023-03-05 RX ORDER — PRENATAL WITH FERROUS FUM AND FOLIC ACID 3080; 920; 120; 400; 22; 1.84; 3; 20; 10; 1; 12; 200; 27; 25; 2 [IU]/1; [IU]/1; MG/1; [IU]/1; MG/1; MG/1; MG/1; MG/1; MG/1; MG/1; UG/1; MG/1; MG/1; MG/1; MG/1
1 TABLET ORAL DAILY
Status: DISCONTINUED | OUTPATIENT
Start: 2023-03-05 | End: 2023-03-08 | Stop reason: HOSPADM

## 2023-03-05 RX ORDER — DOCUSATE SODIUM 100 MG/1
100 CAPSULE, LIQUID FILLED ORAL 2 TIMES DAILY
Status: DISCONTINUED | OUTPATIENT
Start: 2023-03-05 | End: 2023-03-08 | Stop reason: HOSPADM

## 2023-03-05 RX ORDER — MODIFIED LANOLIN
OINTMENT (GRAM) TOPICAL
Status: DISCONTINUED | OUTPATIENT
Start: 2023-03-05 | End: 2023-03-08 | Stop reason: HOSPADM

## 2023-03-05 RX ORDER — METHYLERGONOVINE MALEATE 0.2 MG/ML
INJECTION INTRAVENOUS PRN
Status: DISCONTINUED | OUTPATIENT
Start: 2023-03-05 | End: 2023-03-05 | Stop reason: SDUPTHER

## 2023-03-05 RX ORDER — SODIUM CHLORIDE 0.9 % (FLUSH) 0.9 %
5-40 SYRINGE (ML) INJECTION PRN
Status: DISCONTINUED | OUTPATIENT
Start: 2023-03-05 | End: 2023-03-08 | Stop reason: HOSPADM

## 2023-03-05 RX ORDER — ACETAMINOPHEN 500 MG
1000 TABLET ORAL EVERY 6 HOURS
Status: DISCONTINUED | OUTPATIENT
Start: 2023-03-05 | End: 2023-03-08 | Stop reason: HOSPADM

## 2023-03-05 RX ORDER — DIPHENHYDRAMINE HCL 25 MG
25 TABLET ORAL EVERY 6 HOURS PRN
Status: DISPENSED | OUTPATIENT
Start: 2023-03-05 | End: 2023-03-06

## 2023-03-05 RX ORDER — SODIUM CHLORIDE 9 MG/ML
INJECTION, SOLUTION INTRAVENOUS PRN
Status: DISCONTINUED | OUTPATIENT
Start: 2023-03-05 | End: 2023-03-08 | Stop reason: HOSPADM

## 2023-03-05 RX ORDER — MISOPROSTOL 200 UG/1
800 TABLET ORAL PRN
Status: DISCONTINUED | OUTPATIENT
Start: 2023-03-05 | End: 2023-03-08 | Stop reason: HOSPADM

## 2023-03-05 RX ORDER — MORPHINE SULFATE 1 MG/ML
INJECTION, SOLUTION EPIDURAL; INTRATHECAL; INTRAVENOUS
Status: COMPLETED | OUTPATIENT
Start: 2023-03-05 | End: 2023-03-05

## 2023-03-05 RX ORDER — SODIUM CHLORIDE 0.9 % (FLUSH) 0.9 %
10 SYRINGE (ML) INJECTION PRN
Status: DISCONTINUED | OUTPATIENT
Start: 2023-03-05 | End: 2023-03-05

## 2023-03-05 RX ORDER — ONDANSETRON 2 MG/ML
INJECTION INTRAMUSCULAR; INTRAVENOUS PRN
Status: DISCONTINUED | OUTPATIENT
Start: 2023-03-05 | End: 2023-03-05 | Stop reason: SDUPTHER

## 2023-03-05 RX ORDER — SODIUM CHLORIDE, SODIUM LACTATE, POTASSIUM CHLORIDE, CALCIUM CHLORIDE 600; 310; 30; 20 MG/100ML; MG/100ML; MG/100ML; MG/100ML
INJECTION, SOLUTION INTRAVENOUS CONTINUOUS
Status: DISCONTINUED | OUTPATIENT
Start: 2023-03-05 | End: 2023-03-05

## 2023-03-05 RX ORDER — ONDANSETRON 2 MG/ML
4 INJECTION INTRAMUSCULAR; INTRAVENOUS EVERY 6 HOURS PRN
Status: DISCONTINUED | OUTPATIENT
Start: 2023-03-05 | End: 2023-03-08 | Stop reason: HOSPADM

## 2023-03-05 RX ORDER — CARBOPROST TROMETHAMINE 250 UG/ML
250 INJECTION, SOLUTION INTRAMUSCULAR PRN
Status: DISCONTINUED | OUTPATIENT
Start: 2023-03-05 | End: 2023-03-08 | Stop reason: HOSPADM

## 2023-03-05 RX ORDER — SODIUM CHLORIDE, SODIUM LACTATE, POTASSIUM CHLORIDE, CALCIUM CHLORIDE 600; 310; 30; 20 MG/100ML; MG/100ML; MG/100ML; MG/100ML
INJECTION, SOLUTION INTRAVENOUS CONTINUOUS
Status: DISCONTINUED | OUTPATIENT
Start: 2023-03-05 | End: 2023-03-08 | Stop reason: HOSPADM

## 2023-03-05 RX ORDER — OXYCODONE HYDROCHLORIDE 5 MG/1
5 TABLET ORAL EVERY 4 HOURS PRN
Status: DISCONTINUED | OUTPATIENT
Start: 2023-03-05 | End: 2023-03-08 | Stop reason: HOSPADM

## 2023-03-05 RX ORDER — OXYCODONE HYDROCHLORIDE 5 MG/1
10 TABLET ORAL EVERY 4 HOURS PRN
Status: DISCONTINUED | OUTPATIENT
Start: 2023-03-05 | End: 2023-03-08 | Stop reason: HOSPADM

## 2023-03-05 RX ORDER — NALBUPHINE HCL 10 MG/ML
AMPUL (ML) INJECTION
Status: COMPLETED
Start: 2023-03-05 | End: 2023-03-05

## 2023-03-05 RX ORDER — METOCLOPRAMIDE 5 MG/1
10 TABLET ORAL
Status: DISCONTINUED | OUTPATIENT
Start: 2023-03-06 | End: 2023-03-08 | Stop reason: HOSPADM

## 2023-03-05 RX ORDER — ONDANSETRON 2 MG/ML
4 INJECTION INTRAMUSCULAR; INTRAVENOUS EVERY 6 HOURS PRN
Status: DISCONTINUED | OUTPATIENT
Start: 2023-03-05 | End: 2023-03-05

## 2023-03-05 RX ORDER — SODIUM CHLORIDE, SODIUM LACTATE, POTASSIUM CHLORIDE, AND CALCIUM CHLORIDE .6; .31; .03; .02 G/100ML; G/100ML; G/100ML; G/100ML
1000 INJECTION, SOLUTION INTRAVENOUS ONCE
Status: DISCONTINUED | OUTPATIENT
Start: 2023-03-05 | End: 2023-03-05

## 2023-03-05 RX ORDER — TRISODIUM CITRATE DIHYDRATE AND CITRIC ACID MONOHYDRATE 500; 334 MG/5ML; MG/5ML
30 SOLUTION ORAL ONCE
Status: COMPLETED | OUTPATIENT
Start: 2023-03-05 | End: 2023-03-05

## 2023-03-05 RX ORDER — DIPHENHYDRAMINE HYDROCHLORIDE 50 MG/ML
25 INJECTION INTRAMUSCULAR; INTRAVENOUS EVERY 6 HOURS PRN
Status: DISCONTINUED | OUTPATIENT
Start: 2023-03-05 | End: 2023-03-08 | Stop reason: HOSPADM

## 2023-03-05 RX ORDER — METHYLERGONOVINE MALEATE 0.2 MG/ML
200 INJECTION INTRAVENOUS PRN
Status: DISCONTINUED | OUTPATIENT
Start: 2023-03-05 | End: 2023-03-08 | Stop reason: HOSPADM

## 2023-03-05 RX ORDER — DIPHENHYDRAMINE HYDROCHLORIDE 50 MG/ML
25 INJECTION INTRAMUSCULAR; INTRAVENOUS EVERY 6 HOURS PRN
Status: DISCONTINUED | OUTPATIENT
Start: 2023-03-05 | End: 2023-03-05 | Stop reason: SDUPTHER

## 2023-03-05 RX ORDER — NALOXONE HYDROCHLORIDE 0.4 MG/ML
INJECTION, SOLUTION INTRAMUSCULAR; INTRAVENOUS; SUBCUTANEOUS PRN
Status: DISCONTINUED | OUTPATIENT
Start: 2023-03-05 | End: 2023-03-05 | Stop reason: SDUPTHER

## 2023-03-05 RX ORDER — BUPIVACAINE HYDROCHLORIDE 7.5 MG/ML
INJECTION, SOLUTION INTRASPINAL
Status: COMPLETED | OUTPATIENT
Start: 2023-03-05 | End: 2023-03-05

## 2023-03-05 RX ORDER — DEXAMETHASONE SODIUM PHOSPHATE 10 MG/ML
INJECTION, SOLUTION INTRAMUSCULAR; INTRAVENOUS PRN
Status: DISCONTINUED | OUTPATIENT
Start: 2023-03-05 | End: 2023-03-05 | Stop reason: SDUPTHER

## 2023-03-05 RX ORDER — ONDANSETRON 2 MG/ML
4 INJECTION INTRAMUSCULAR; INTRAVENOUS EVERY 6 HOURS PRN
Status: DISCONTINUED | OUTPATIENT
Start: 2023-03-05 | End: 2023-03-05 | Stop reason: SDUPTHER

## 2023-03-05 RX ORDER — METOCLOPRAMIDE HYDROCHLORIDE 5 MG/ML
10 INJECTION INTRAMUSCULAR; INTRAVENOUS ONCE
Status: COMPLETED | OUTPATIENT
Start: 2023-03-05 | End: 2023-03-05

## 2023-03-05 RX ORDER — OXYCODONE HYDROCHLORIDE 5 MG/1
5 TABLET ORAL EVERY 4 HOURS PRN
Status: DISCONTINUED | OUTPATIENT
Start: 2023-03-05 | End: 2023-03-05 | Stop reason: SDUPTHER

## 2023-03-05 RX ORDER — NALBUPHINE HCL 10 MG/ML
10 AMPUL (ML) INJECTION
Status: COMPLETED | OUTPATIENT
Start: 2023-03-05 | End: 2023-03-05

## 2023-03-05 RX ORDER — FERROUS SULFATE 325(65) MG
325 TABLET ORAL 2 TIMES DAILY WITH MEALS
Status: DISCONTINUED | OUTPATIENT
Start: 2023-03-05 | End: 2023-03-08 | Stop reason: HOSPADM

## 2023-03-05 RX ORDER — SODIUM CHLORIDE 0.9 % (FLUSH) 0.9 %
5-40 SYRINGE (ML) INJECTION EVERY 12 HOURS SCHEDULED
Status: DISCONTINUED | OUTPATIENT
Start: 2023-03-05 | End: 2023-03-08 | Stop reason: HOSPADM

## 2023-03-05 RX ORDER — DIPHENHYDRAMINE HYDROCHLORIDE 50 MG/ML
INJECTION INTRAMUSCULAR; INTRAVENOUS PRN
Status: DISCONTINUED | OUTPATIENT
Start: 2023-03-05 | End: 2023-03-05 | Stop reason: SDUPTHER

## 2023-03-05 RX ORDER — SODIUM CHLORIDE 9 MG/ML
INJECTION, SOLUTION INTRAVENOUS PRN
Status: DISCONTINUED | OUTPATIENT
Start: 2023-03-05 | End: 2023-03-05

## 2023-03-05 RX ORDER — KETOROLAC TROMETHAMINE 30 MG/ML
30 INJECTION, SOLUTION INTRAMUSCULAR; INTRAVENOUS EVERY 6 HOURS
Status: DISCONTINUED | OUTPATIENT
Start: 2023-03-05 | End: 2023-03-05

## 2023-03-05 RX ADMIN — BUPIVACAINE HYDROCHLORIDE IN DEXTROSE 13.5 MG: 7.5 INJECTION, SOLUTION SUBARACHNOID at 13:41

## 2023-03-05 RX ADMIN — METHYLERGONOVINE MALEATE 200 MCG: 0.2 INJECTION, SOLUTION INTRAMUSCULAR; INTRAVENOUS at 14:00

## 2023-03-05 RX ADMIN — PHENYLEPHRINE HYDROCHLORIDE 200 MCG: 10 INJECTION INTRAVENOUS at 13:46

## 2023-03-05 RX ADMIN — PHENYLEPHRINE HYDROCHLORIDE 300 MCG: 10 INJECTION INTRAVENOUS at 13:49

## 2023-03-05 RX ADMIN — PHENYLEPHRINE HYDROCHLORIDE 300 MCG: 10 INJECTION INTRAVENOUS at 14:01

## 2023-03-05 RX ADMIN — METOCLOPRAMIDE 10 MG: 5 INJECTION, SOLUTION INTRAMUSCULAR; INTRAVENOUS at 09:04

## 2023-03-05 RX ADMIN — MORPHINE SULFATE 0.15 MG: 1 INJECTION, SOLUTION EPIDURAL; INTRATHECAL; INTRAVENOUS at 13:41

## 2023-03-05 RX ADMIN — Medication 999 ML/HR: at 13:58

## 2023-03-05 RX ADMIN — SODIUM CHLORIDE, POTASSIUM CHLORIDE, SODIUM LACTATE AND CALCIUM CHLORIDE: 600; 310; 30; 20 INJECTION, SOLUTION INTRAVENOUS at 13:34

## 2023-03-05 RX ADMIN — LURASIDONE HYDROCHLORIDE 40 MG: 40 TABLET, FILM COATED ORAL at 21:32

## 2023-03-05 RX ADMIN — SODIUM CHLORIDE, POTASSIUM CHLORIDE, SODIUM LACTATE AND CALCIUM CHLORIDE: 600; 310; 30; 20 INJECTION, SOLUTION INTRAVENOUS at 15:19

## 2023-03-05 RX ADMIN — DEXAMETHASONE SODIUM PHOSPHATE 10 MG: 10 INJECTION, SOLUTION INTRAMUSCULAR; INTRAVENOUS at 14:01

## 2023-03-05 RX ADMIN — DIPHENHYDRAMINE HYDROCHLORIDE 25 MG: 50 INJECTION, SOLUTION INTRAMUSCULAR; INTRAVENOUS at 14:03

## 2023-03-05 RX ADMIN — CEFOXITIN SODIUM 2000 MG: 2 POWDER, FOR SOLUTION INTRAVENOUS at 12:50

## 2023-03-05 RX ADMIN — NALBUPHINE HYDROCHLORIDE 10 MG: 10 INJECTION, SOLUTION INTRAMUSCULAR; INTRAVENOUS; SUBCUTANEOUS at 16:23

## 2023-03-05 RX ADMIN — ONDANSETRON 4 MG: 2 INJECTION INTRAMUSCULAR; INTRAVENOUS at 14:01

## 2023-03-05 RX ADMIN — PHENYLEPHRINE HYDROCHLORIDE 100 MCG: 10 INJECTION INTRAVENOUS at 14:16

## 2023-03-05 RX ADMIN — MORPHINE SULFATE 2 MG: 1 INJECTION, SOLUTION EPIDURAL; INTRATHECAL; INTRAVENOUS at 14:12

## 2023-03-05 RX ADMIN — MORPHINE SULFATE 2 MG: 1 INJECTION, SOLUTION EPIDURAL; INTRATHECAL; INTRAVENOUS at 14:03

## 2023-03-05 RX ADMIN — Medication 10 MG: at 16:23

## 2023-03-05 RX ADMIN — MORPHINE SULFATE 2 MG: 1 INJECTION, SOLUTION EPIDURAL; INTRATHECAL; INTRAVENOUS at 14:22

## 2023-03-05 RX ADMIN — DOCUSATE SODIUM 100 MG: 100 CAPSULE, LIQUID FILLED ORAL at 21:32

## 2023-03-05 RX ADMIN — DIPHENHYDRAMINE HCL 25 MG: 25 TABLET ORAL at 21:36

## 2023-03-05 RX ADMIN — FAMOTIDINE 20 MG: 10 INJECTION, SOLUTION INTRAVENOUS at 09:04

## 2023-03-05 RX ADMIN — SODIUM CHLORIDE, POTASSIUM CHLORIDE, SODIUM LACTATE AND CALCIUM CHLORIDE: 600; 310; 30; 20 INJECTION, SOLUTION INTRAVENOUS at 14:14

## 2023-03-05 RX ADMIN — Medication 50 MILLI-UNITS/MIN: at 15:19

## 2023-03-05 RX ADMIN — SODIUM CITRATE AND CITRIC ACID MONOHYDRATE 30 ML: 500; 334 SOLUTION ORAL at 12:50

## 2023-03-05 RX ADMIN — PHENYLEPHRINE HYDROCHLORIDE 400 MCG: 10 INJECTION INTRAVENOUS at 13:54

## 2023-03-05 RX ADMIN — PHENYLEPHRINE HYDROCHLORIDE 200 MCG: 10 INJECTION INTRAVENOUS at 13:43

## 2023-03-05 ASSESSMENT — PAIN SCALES - GENERAL: PAINLEVEL_OUTOF10: 0

## 2023-03-05 ASSESSMENT — LIFESTYLE VARIABLES: SMOKING_STATUS: 1

## 2023-03-05 NOTE — DISCHARGE INSTRUCTIONS
May discharge home     IF FROM               until  discharge      Temp less than 100 F   BP less than   140/90   No nausea or  vomiting   Heart rate    Oxygen saturation 95% or higher   Ambulating well   Voiding well   Minimal bleeding   No chest pain, shortness of breath, dyspnea on exertion   Pain controlled with oral pain meds   No calf or leg pain or redness   No complaints      Nothing per vagina and no tub baths for 6 weeks. May shower. Call doctor with any problems. Home with routine    post     precautions    CALL DOCTOR BEFORE PATIENT IS DISCHARGED    Return to office:  11:30    Tub bath in 6 weeks  You may take a shower  Driving 2-3 weeks  Sexual activity 6 weeks  Work/School  6 weeks  Walking  As tolerated  Stairs as tolerated  Lifting no heavy lifting         Section: What to Expect at [address] [place] [address]     A  section, or , is surgery to deliver your baby through a cut that the doctor makes in your lower belly and uterus. The cut is called an incision. You may have some pain in your lower belly and need pain medicine for 1 to 2 weeks. You can expect some vaginal bleeding for several weeks. You will probably need about 6 weeks to fully recover. It's important to take it easy while the incision heals. Avoid heavy lifting, strenuous activities, and exercises that strain the belly muscles while you recover. Ask a family member or friend for help with housework, cooking, and shopping. This care sheet gives you a general idea about how long it will take for you to recover. But each person recovers at a different pace. Follow the steps below to get better as quickly as possible. How can you care for yourself at home? Activity    Rest when you feel tired. Getting enough sleep will help you recover. Try to walk each day. Start by walking a little more than you did the day before. Bit by bit, increase the amount you walk.  Walking boosts blood flow and helps prevent pneumonia, constipation, and blood clots. Avoid strenuous activities, such as bicycle riding, jogging, weightlifting, and aerobic exercise, for 6 weeks or until your doctor says it is okay. Until your doctor says it is okay, do not lift anything heavier than your baby. Do not do sit-ups or other exercises that strain the belly muscles for 6 weeks or until your doctor says it is okay. Hold a pillow over your incision when you cough or take deep breaths. This will support your belly and decrease your pain. You may shower as usual. Pat the incision dry when you are done. You will have some vaginal bleeding. Wear sanitary pads. Do not douche or use tampons until your doctor says it is okay. Ask your doctor when you can drive again. You will probably need to take at least 6 weeks off work. It depends on the type of work you do and how you feel. Ask your doctor when it is okay for you to have sex. Diet    You can eat your normal diet. If your stomach is upset, try bland, low-fat foods like plain rice, broiled chicken, toast, and yogurt. Drink plenty of fluids (unless your doctor tells you not to). You may notice that your bowel movements are not regular right after your surgery. This is common. Try to avoid constipation and straining with bowel movements. You may want to take a fiber supplement every day. If you have not had a bowel movement after a couple of days, ask your doctor about taking a mild laxative. If you are breastfeeding, limit alcohol. Alcohol can cause a lack of energy and other health problems for the baby when a breastfeeding woman drinks heavily. It can also get in the way of a mom's ability to feed her baby or to care for the child in other ways. There isn't a lot of research about exactly how much alcohol can harm a baby. Having no alcohol is the safest choice for your baby.  If you choose to have a drink now and then, have only one drink, and limit the number of occasions that you have a drink. Wait to breastfeed at least 2 hours after you have a drink to reduce the amount of alcohol the baby may get in the milk. Medicines    Your doctor will tell you if and when you can restart your medicines. You will also get instructions about taking any new medicines. If you stopped taking aspirin or some other blood thinner, your doctor will tell you when to start taking it again. Take pain medicines exactly as directed. If the doctor gave you a prescription medicine for pain, take it as prescribed. If you are not taking a prescription pain medicine, ask your doctor if you can take an over-the-counter medicine. If you think your pain medicine is making you sick to your stomach: Take your medicine after meals (unless your doctor has told you not to). Ask your doctor for a different pain medicine. If your doctor prescribed antibiotics, take them as directed. Do not stop taking them just because you feel better. You need to take the full course of antibiotics. Incision care    If you have strips of tape on the incision, leave the tape on for a week or until it falls off. Wash the area daily with warm, soapy water, and pat it dry. Don't use hydrogen peroxide or alcohol, which can slow healing. You may cover the area with a gauze bandage if it weeps or rubs against clothing. Change the bandage every day. Keep the area clean and dry. Other instructions    If you breastfeed your baby, you may be more comfortable while you are healing if you don't rest your baby on your belly. Try tucking your baby under your arm, with your baby's body along the side you will be feeding on. Support your baby's upper body with your arm. With that hand you can control your baby's head to bring your baby's mouth to your breast. This is sometimes called the football hold. Follow-up care is a key part of your treatment and safety.  Be sure to make and go to all appointments, and call your doctor if you are having problems. It's also a good idea to know your test results and keep a list of the medicines you take. When should you call for help? Share this information with your partner, family, or a friend. They can help you watch for warning signs. Call 911  anytime you think you may need emergency care. For example, call if:    You have thoughts of harming yourself, your baby, or another person. You passed out (lost consciousness). You have chest pain, are short of breath, or cough up blood. You have a seizure. Call your doctor now or seek immediate medical care if:    You have loose stitches, or your incision comes open. You have signs of hemorrhage (too much bleeding), such as:  Heavy vaginal bleeding. This means that you are soaking through one or more pads in an hour. Or you pass blood clots bigger than an egg. Feeling dizzy or lightheaded, or you feel like you may faint. Feeling so tired or weak that you cannot do your usual activities. A fast or irregular heartbeat. New or worse belly pain. You have symptoms of infection, such as: Increased pain, swelling, warmth, or redness. Red streaks leading from the incision. Pus draining from the incision. A fever. Vaginal discharge that smells bad. New or worse belly pain. You have symptoms of a blood clot in your leg (called a deep vein thrombosis), such as:  Pain in your calf, back of the knee, thigh, or groin. Redness and swelling in your leg or groin. You have signs of preeclampsia, such as:  Sudden swelling of your face, hands, or feet. New vision problems (such as dimness, blurring, or seeing spots). A severe headache. Watch closely for changes in your health, and be sure to contact your doctor if:    Your vaginal bleeding isn't decreasing. You feel sad, anxious, or hopeless for more than a few days. You are having problems with your breasts or breastfeeding.   Where can you learn more?  Go to https://[email or web address].net/patientEd and enter M806 to learn more about \" Section: What to Expect at Home.\"  Current as of: 2022               Content Version: 13.5  © 9805-6466 [employer].   Care instructions adapted under license by [employer]. If you have questions about a medical condition or this instruction, always ask your healthcare professional. [employer] disclaims any warranty or liability for your use of this information.

## 2023-03-05 NOTE — ANESTHESIA PROCEDURE NOTES
Spinal Block    Patient location during procedure: OR  End time: 3/5/2023 1:41 PM  Reason for block: primary anesthetic and at surgeon's request  Staffing  Performed: resident/CRNA   Anesthesiologist: Booker Salcido MD  Resident/CRNA: JO-ANN Reid CRNA  Spinal Block  Patient position: sitting  Prep: Betadine and site prepped and draped  Patient monitoring: cardiac monitor, continuous pulse ox, continuous capnometry and frequent blood pressure checks  Approach: midline  Location: L3/L4  Provider prep: mask, sterile gloves and sterile gown  Local infiltration: lidocaine  Needle  Needle type: Pencan   Needle gauge: 25 G  Needle length: 3.5 in  Assessment  Sensory level: T6  Events: cerebrospinal fluid  Swirl obtained: Yes  CSF: clear  Attempts: 1  Hemodynamics: stable  Preanesthetic Checklist  Completed: patient identified, IV checked, site marked, risks and benefits discussed, surgical/procedural consents, equipment checked, pre-op evaluation, timeout performed, anesthesia consent given, oxygen available and monitors applied/VS acknowledged

## 2023-03-05 NOTE — OP NOTE
[address]                                OPERATIVE REPORT    PATIENT NAME: Dann Fuentes                :        1995  MED REC NO:   [id number]                            ROOM:       [facility]  ACCOUNT NO:   [de-identified]                           ADMIT DATE: 2023  PROVIDER:     Farida Osborne MD    DATE OF PROCEDURE:  2023    PREOPERATIVE DIAGNOSIS:  Intrauterine pregnancy at 39 weeks, previous  , declining . POSTOPERATIVE DIAGNOSIS:  Intrauterine pregnancy at 39 weeks, previous  , declining . PROCEDURE PERFORMED:  Repeat low-transverse . SURGEON:  Farida Osborne MD    ANESTHESIA:  Spinal.    EBL:  500. REPLACEMENT:  1400. URINE OUTPUT:  200. FINDINGS:  Female infant weighing 8 pounds 11 ounces with cord arterial  gas pH 7.359, base excess -1.5. Cord venous gas pH 7.378, base excess  -1.1. Normal-appearing tubes and ovaries. COMPLICATIONS:  None. COUNTS:  Sponge, lap, needle counts were correct. The patient went to  the recovery room in stable condition. PROCEDURE IN DETAIL:  After informed consent was obtained, and under an  adequate level of spinal anesthesia, the patient's abdomen was prepped  with DuraPrep. The patient was grounded. The patient was draped with  sterile drapes. Low-transverse skin incision was made two  fingerbreadths above the pubic bone. Incision was taken down level of  fascia. Fascia was nicked in midline and the incision was carried  upwards bilaterally. Care was made to assure the fascial incision did  not extend beyond the borders of the rectus muscle. Fascia was  dissected off the bellies of rectus muscle. Bellies of rectus muscle  were sharply . Peritoneum was tented and sharply entered with  care to avoid bowel or bladder. Incision was carried out cephalad and  caudad.   Bladder flap was made. Low uterine cervical transverse  incision was made and carried upwards bilaterally using surgeon's  fingers. Amniotic fluid sac was ruptured. Fetal vertex was identified  and gently delivered into the incision. Fetal trunk was delivered with  minimal traction applied in an axis to excise parallel to the fetal  spine. Nares and hypopharynx were suctioned. Delayed cord clamping was  performed. Cord was clamped and cut. Infant was crying and vigorous. Cord gases and blood samples were obtained. Placenta was removed  spontaneously. Placenta was intact and sent to pathology. Uterus was  curettaged with lap x2. Uterine incision was reapproximated in two  layers using a running locking suture of 0 Vicryl. All clots were  evacuated. All pedicles were examined were hemostatic. Parietal  peritoneum was reapproximated in running fashion. Fascia was  reapproximated in interrupted fashion with sutures placed a centimeter  apart and 2 cm in with care to avoid injury to the ilioinguinal or  iliohypogastric nerves. Subcu was reapproximated in interrupted  fashion. Skin was reapproximated in running fashion. Sponge, lap,  needle counts were correct. There were no complications. The patient  tolerated procedure well and went to recovery room in stable condition.         Brian Tejada MD    D: 03/05/2023 15:05:51       T: 03/05/2023 15:08:18     [email or web address]  Job#: [id number]     Doc#: [id number]    CC:

## 2023-03-05 NOTE — PROGRESS NOTES
Dr Yanira Rivero updated with patient status and UA results. Dr Ting Chua transferred into patient room to speak with patient.

## 2023-03-05 NOTE — ANESTHESIA PRE PROCEDURE
Department of Anesthesiology  Preprocedure Note       Name:  Kiki Acevedo   Age:  32 y.o.  :  1995                                          MRN:  [id number]         Date:  3/5/2023      Surgeon: Abbi Dempsey):  Sury Wisdom MD    Procedure: Procedure(s):  REPEAT  SECTION    Medications prior to admission:   Prior to Admission medications    Medication Sig Start Date End Date Taking?  Authorizing Provider   lurasidone (LATUDA) 40 MG TABS tablet Take 40 mg by mouth daily    Historical Provider, MD   acetaminophen (TYLENOL) 500 MG tablet Take 2 tablets by mouth every 6 hours as needed for Pain 10/25/22   Deven Velasquez DO   Prenatal Vit-Fe Fumarate-FA (PRENATAL COMPLETE PO) Take by mouth    Historical Provider, MD       Current medications:    Current Facility-Administered Medications   Medication Dose Route Frequency Provider Last Rate Last Admin    lactated ringers IV soln infusion   IntraVENous Continuous Sury Wisdom MD        lactated ringers bolus  1,000 mL IntraVENous Once Sury Wisdom MD        sodium chloride flush 0.9 % injection 10 mL  10 mL IntraVENous 2 times per day Sury Wisdom MD        sodium chloride flush 0.9 % injection 10 mL  10 mL IntraVENous PRN Sury Wisdom MD        0.9 % sodium chloride infusion   IntraVENous PRN Sury Wisdom MD        cefOXitin (MEFOXIN) 2,000 mg in sodium chloride 0.9 % 50 mL IVPB ([id number])  2,000 mg IntraVENous On Call to [address] [place] [address] MD        citric acid-sodium citrate (BICITRA) solution 30 mL  30 mL Oral Once Sury Wisdom MD        famotidine (PEPCID) 20 mg in sodium chloride (PF) 0.9 % 10 mL injection  20 mg IntraVENous Once Sury Wisdom MD        metoclopramide (REGLAN) injection 10 mg  10 mg IntraVENous Once Sury Wisdom MD        oxytocin (PITOCIN) 30 units in 500 mL infusion  87.3 lorenza-units/min IntraVENous Continuous PRN Sury Wisdom MD        And    oxytocin (PITOCIN) 30 units in 500 mL infusion  10 Units IntraVENous PRN Kaiden Salmeron MD        ondansetron (ZOFRAN) injection 4 mg  4 mg IntraVENous Q6H PRN Kaiden Salmeron MD           Allergies: Allergies   Allergen Reactions    Pcn [Penicillins] Hives       Problem List:    Patient Active Problem List   Diagnosis Code    Liveborn by  Z38.01    Chronic female pelvic pain R10.2, G89.29    Endometriosis of pelvis [id number]    Miscarriage O03.9    Retained placenta O73.0    24 weeks gestation of pregnancy Z3A.24    27 weeks gestation of pregnancy Z3A.27    37 weeks gestation of pregnancy K0Y.07    Complication of pregnancy in third trimester O26.93    Normal pregnancy in third trimester Z34.93     delivery delivered O82    Liveborn by  Z38.01    Wound infection T14. 8XXA, L08.9    Pelvic pain in female R10.2    NST (non-stress test) reactive Z36.89    NST (non-stress test) reactive on fetal surveillance Z36.89    Non-reactive NST (non-stress test) R49.2    Complicated pregnancy, third trimester O26.93    33 weeks gestation of pregnancy Z3A.33    Abdominal pain affecting pregnancy O26.899, R10.9    Abdominal pain R10.9    Liveborn infant by  delivery Z38.01       Past Medical History:        Diagnosis Date    Depression     ADHD    Endometriosis of pelvis 2018    Miscarriage     Prolonged emergence from general anesthesia     slow to wake up after c section    Wound infection 10/7/2020       Past Surgical History:        Procedure Laterality Date    ABDOMEN SURGERY       SECTION       SECTION N/A 2020    REPEAT  SECTION performed by Kaiden Salmeron MD at [facility]    DILATION AND CURETTAGE OF UTERUS N/A 2019    DILATATION AND CURETTAGE SUCTION performed by Kaiden Salmeron MD at [address] [place] [address]  2017    LAPAROSCOPY N/A 2022    LAPAROSCOPY RIGHT OVARIAN CYSTECTOMY FULGERATION OF PELVIC ENDOMETRIOSIS performed by Kaiden Salmeron MD at [facility] OR    MIDDLE EAR SURGERY      MYRINGOTOMY AND TYMPANOSTOMY TUBE PLACEMENT      NM LAPAROSCOPY W/LYSIS OF ADHESIONS N/A 11/30/2018    DIAGNOSTIC LAPAROSCOPY. aspiration of left ovarian cyst performed by Jarred Woodall MD at [place] [address] History:    Social History     Tobacco Use    Smoking status: Every Day     Types: Cigarettes    Smokeless tobacco: Never   Substance Use Topics    Alcohol use:  No                                Ready to quit: Not Answered  Counseling given: Not Answered      Vital Signs (Current):   Vitals:    03/04/23 2108   BP: 113/66   Pulse: 94   Resp: 16   Temp: 36.8 °C (98.2 °F)   TempSrc: Oral   SpO2: 98%                                              BP Readings from Last 3 Encounters:   03/04/23 113/66   02/27/23 (!) 102/54   02/15/23 108/61       NPO Status:                                                                                 BMI:   Wt Readings from Last 3 Encounters:   02/26/23 223 lb (101.2 kg)   01/30/23 223 lb (101.2 kg)   10/25/22 220 lb (99.8 kg)     There is no height or weight on file to calculate BMI.    CBC:   Lab Results   Component Value Date/Time    WBC 5.0 03/05/2023 06:47 AM    RBC 3.74 03/05/2023 06:47 AM    HGB 9.0 03/05/2023 06:47 AM    HCT 31.0 03/05/2023 06:47 AM    MCV 82.9 03/05/2023 06:47 AM    RDW 20.5 03/05/2023 06:47 AM     03/05/2023 06:47 AM       CMP:   Lab Results   Component Value Date/Time     03/05/2023 06:47 AM    K 4.3 03/05/2023 06:47 AM    K 3.8 07/19/2022 09:15 PM     03/05/2023 06:47 AM    CO2 21 03/05/2023 06:47 AM    BUN 10 03/05/2023 06:47 AM    CREATININE 0.6 03/05/2023 06:47 AM    GFRAA >60 08/24/2022 06:48 PM    LABGLOM >60 03/05/2023 06:47 AM    GLUCOSE 85 03/05/2023 06:47 AM    PROT 6.4 03/05/2023 06:47 AM    CALCIUM 8.8 03/05/2023 06:47 AM    BILITOT 0.3 03/05/2023 06:47 AM    ALKPHOS 120 03/05/2023 06:47 AM    AST 17 03/05/2023 06:47 AM    ALT 10 03/05/2023 06:47 AM POC Tests: No results for input(s): POCGLU, POCNA, POCK, POCCL, POCBUN, POCHEMO, POCHCT in the last 72 hours. Coags: No results found for: PROTIME, INR, APTT    HCG (If Applicable):   Lab Results   Component Value Date    PREGTESTUR NEGATIVE 11/27/2018    HCG <2.0 12/27/2012        ABGs: No results found for: PHART, PO2ART, EST6ZRD, JBT6ZVF, BEART, X5CZSDKR     Type & Screen (If Applicable):  No results found for: LABABO, LABRH    Drug/Infectious Status (If Applicable):  No results found for: HIV, HEPCAB    COVID-19 Screening (If Applicable):   Lab Results   Component Value Date/Time    COVID19 Not Detected 06/04/2021 09:04 AM           Anesthesia Evaluation  Patient summary reviewed   History of anesthetic complications: Prolonged emergence. Airway:           Dental:          Pulmonary:   (+) current smoker                           Cardiovascular:                      Neuro/Psych:   (+) psychiatric history (ADHD):depression/anxiety             GI/Hepatic/Renal:             Endo/Other:    (+) blood dyscrasia (9/31): anemia:., .                  ROS comment: Chronic female pelvic pain  Endometriosis of pelvis       Abdominal:   (+) obese,           Vascular: Other Findings:           Anesthesia Plan      spinal     ASA 2           MIPS: Postoperative opioids intended and Prophylactic antiemetics administered. Anesthetic plan and risks discussed with patient.         Attending anesthesiologist reviewed and agrees with Preprocedure content      Post-op pain plan if not by surgeon: intrathecal narcotics            JO-ANN Whitfield - CRNA   3/5/2023

## 2023-03-05 NOTE — PROGRESS NOTES
, 38  arrived via wheelchair complaining of abdominal pain for the last two hours. Denies leaking of fluid or vaginal bleeding. Perceived fetal movement by patient.  Oriented to triage 2

## 2023-03-05 NOTE — H&P
[address]                              HISTORY AND PHYSICAL    PATIENT NAME: Krystle Beavers                :        1995  MED REC NO:   [id number]                            ROOM:       [facility]  ACCOUNT NO:   [de-identified]                           ADMIT DATE: 2023. .. PROVIDER:     Emilee Bhatia MD    HISTORY OF PRESENT ILLNESS:  A 49-year-old white female presented on  2023 with complaints of cramping and abdominal pain. Fetal heart  rate tracing was reactive and she was noted not to be in labor. UA was  negative. She was notified of this. She requested not to be discharged  and have her  the next day at 39 weeks due to discomfort. Anesthesia was available, as was staff. Therefore,  was  scheduled for 2023. She has had previous C-sections and is  declining . For her past medical, surgical, GYN, social, family history, please  refer to ACOG forms A and B. REVIEW OF SYSTEMS:  Negative for cardiac, respiratory, GI,   abnormalities. PHYSICAL EXAMINATION:  VITAL SIGNS:  Stable. She is afebrile. HEENT:  Negative. LUNGS:  Clear to auscultation. CV:  Regular rate and rhythm. ABDOMEN:  Gravid, soft, nontender. Estimated fetal weight 3800 gm. Fetal heart tones are present in 130s with uqzd-cq-xbnx variability  present. Acceleration is noted. Rare contractions. Cervix is closed  and long. EXTREMITIES:  No clubbing, cyanosis. 2+ edema. No cords or erythema. ASSESSMENT:  Intrauterine pregnancy at 39 weeks, previous ,  declining . Plan is for repeat . Risks of the procedure  have been reviewed, including but not limited to infection, bleeding,  injury to bowel, bladder, ureter, major vessels, fetal death, fetal  damage, need for respiratory assistance of the .   She has been  notified in the events of excessive hemorrhage, she may require blood  transfusion. All her questions have been answered and she wishes to  proceed with surgery.         Harvey Zacarias MD    D: 03/05/2023 15:02:59       T: 03/05/2023 15:05:51     [email or web address]  Job#: [id number]     Doc#: [id number]    CC:

## 2023-03-05 NOTE — PROGRESS NOTES
Dr Oanh Luna notified of patient arrival and complaint. Notified of reactive . No uterine contractions on the monitor.  Orders received

## 2023-03-06 LAB — HEMOGLOBIN: 9.3 G/DL (ref 11.5–15.5)

## 2023-03-06 PROCEDURE — 6370000000 HC RX 637 (ALT 250 FOR IP): Performed by: ANESTHESIOLOGY

## 2023-03-06 PROCEDURE — 85018 HEMOGLOBIN: CPT

## 2023-03-06 PROCEDURE — 36415 COLL VENOUS BLD VENIPUNCTURE: CPT

## 2023-03-06 PROCEDURE — 6370000000 HC RX 637 (ALT 250 FOR IP): Performed by: LEGAL MEDICINE

## 2023-03-06 PROCEDURE — 1220000001 HC SEMI PRIVATE L&D R&B

## 2023-03-06 RX ORDER — IBUPROFEN 600 MG/1
600 TABLET ORAL
Status: DISCONTINUED | OUTPATIENT
Start: 2023-03-06 | End: 2023-03-06

## 2023-03-06 RX ADMIN — FERROUS SULFATE TAB 325 MG (65 MG ELEMENTAL FE) 325 MG: 325 (65 FE) TAB at 08:29

## 2023-03-06 RX ADMIN — ACETAMINOPHEN 1000 MG: 500 TABLET ORAL at 21:23

## 2023-03-06 RX ADMIN — DIPHENHYDRAMINE HCL 25 MG: 25 TABLET ORAL at 06:29

## 2023-03-06 RX ADMIN — DOCUSATE SODIUM 100 MG: 100 CAPSULE, LIQUID FILLED ORAL at 08:29

## 2023-03-06 RX ADMIN — ACETAMINOPHEN 1000 MG: 500 TABLET ORAL at 15:32

## 2023-03-06 RX ADMIN — FERROUS SULFATE TAB 325 MG (65 MG ELEMENTAL FE) 325 MG: 325 (65 FE) TAB at 18:57

## 2023-03-06 RX ADMIN — DOCUSATE SODIUM 100 MG: 100 CAPSULE, LIQUID FILLED ORAL at 21:26

## 2023-03-06 RX ADMIN — PRENATAL WITH FERROUS FUM AND FOLIC ACID 1 TABLET: 3080; 920; 120; 400; 22; 1.84; 3; 20; 10; 1; 12; 200; 27; 25; 2 TABLET ORAL at 08:28

## 2023-03-06 RX ADMIN — METOCLOPRAMIDE 10 MG: 5 TABLET ORAL at 21:25

## 2023-03-06 RX ADMIN — OXYCODONE HYDROCHLORIDE 5 MG: 5 TABLET ORAL at 21:26

## 2023-03-06 RX ADMIN — METOCLOPRAMIDE 10 MG: 5 TABLET ORAL at 18:56

## 2023-03-06 RX ADMIN — METOCLOPRAMIDE 10 MG: 5 TABLET ORAL at 07:42

## 2023-03-06 RX ADMIN — LURASIDONE HYDROCHLORIDE 40 MG: 40 TABLET, FILM COATED ORAL at 08:28

## 2023-03-06 RX ADMIN — OXYCODONE HYDROCHLORIDE 10 MG: 5 TABLET ORAL at 09:52

## 2023-03-06 RX ADMIN — ACETAMINOPHEN 1000 MG: 500 TABLET ORAL at 06:28

## 2023-03-06 RX ADMIN — METOCLOPRAMIDE 10 MG: 5 TABLET ORAL at 12:07

## 2023-03-06 ASSESSMENT — PAIN DESCRIPTION - LOCATION
LOCATION: INCISION
LOCATION: ABDOMEN
LOCATION: ABDOMEN
LOCATION: INCISION

## 2023-03-06 ASSESSMENT — PAIN DESCRIPTION - ORIENTATION
ORIENTATION: LOWER;MID
ORIENTATION: RIGHT
ORIENTATION: RIGHT
ORIENTATION: LOWER

## 2023-03-06 ASSESSMENT — PAIN DESCRIPTION - DESCRIPTORS
DESCRIPTORS: DISCOMFORT
DESCRIPTORS: SHARP
DESCRIPTORS: STABBING
DESCRIPTORS: BURNING;STABBING

## 2023-03-06 ASSESSMENT — PAIN - FUNCTIONAL ASSESSMENT
PAIN_FUNCTIONAL_ASSESSMENT: ACTIVITIES ARE NOT PREVENTED

## 2023-03-06 ASSESSMENT — PAIN SCALES - GENERAL
PAINLEVEL_OUTOF10: 9
PAINLEVEL_OUTOF10: 7
PAINLEVEL_OUTOF10: 9
PAINLEVEL_OUTOF10: 5
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
## 2023-03-06 NOTE — PROGRESS NOTES
Santoyo DC'd and IV normal saline locked at this time. Patient given madalyn-care with pads/panties applied. Patient understanding of calling for first attempt at ambulation and void. Call light in reach.

## 2023-03-06 NOTE — PROGRESS NOTES
RN to bedside for nightly assessment/vitals. Plan of care for the night discussed with patient with a verbalized understanding. White board updated with change of shift staff as well as goals and pain medication availability. Call light is within reach and no needs expressed at this time.      Urine output from 5822-9715: 1100mL

## 2023-03-07 LAB — HEMOGLOBIN: 9.6 G/DL (ref 11.5–15.5)

## 2023-03-07 PROCEDURE — 36415 COLL VENOUS BLD VENIPUNCTURE: CPT

## 2023-03-07 PROCEDURE — 85018 HEMOGLOBIN: CPT

## 2023-03-07 PROCEDURE — 1220000001 HC SEMI PRIVATE L&D R&B

## 2023-03-07 PROCEDURE — 6370000000 HC RX 637 (ALT 250 FOR IP): Performed by: LEGAL MEDICINE

## 2023-03-07 PROCEDURE — 6360000002 HC RX W HCPCS: Performed by: LEGAL MEDICINE

## 2023-03-07 RX ADMIN — OXYCODONE HYDROCHLORIDE 5 MG: 5 TABLET ORAL at 18:03

## 2023-03-07 RX ADMIN — LURASIDONE HYDROCHLORIDE 40 MG: 40 TABLET, FILM COATED ORAL at 23:38

## 2023-03-07 RX ADMIN — ACETAMINOPHEN 1000 MG: 500 TABLET ORAL at 23:37

## 2023-03-07 RX ADMIN — METOCLOPRAMIDE 10 MG: 5 TABLET ORAL at 16:35

## 2023-03-07 RX ADMIN — FERROUS SULFATE TAB 325 MG (65 MG ELEMENTAL FE) 325 MG: 325 (65 FE) TAB at 08:06

## 2023-03-07 RX ADMIN — HYDROMORPHONE HYDROCHLORIDE 0.5 MG: 1 INJECTION, SOLUTION INTRAMUSCULAR; INTRAVENOUS; SUBCUTANEOUS at 08:45

## 2023-03-07 RX ADMIN — DOCUSATE SODIUM 100 MG: 100 CAPSULE, LIQUID FILLED ORAL at 23:41

## 2023-03-07 RX ADMIN — ACETAMINOPHEN 1000 MG: 500 TABLET ORAL at 12:31

## 2023-03-07 RX ADMIN — DOCUSATE SODIUM 100 MG: 100 CAPSULE, LIQUID FILLED ORAL at 08:06

## 2023-03-07 RX ADMIN — METOCLOPRAMIDE 10 MG: 5 TABLET ORAL at 11:26

## 2023-03-07 RX ADMIN — FERROUS SULFATE TAB 325 MG (65 MG ELEMENTAL FE) 325 MG: 325 (65 FE) TAB at 16:35

## 2023-03-07 RX ADMIN — OXYCODONE HYDROCHLORIDE 5 MG: 5 TABLET ORAL at 05:07

## 2023-03-07 RX ADMIN — PRENATAL WITH FERROUS FUM AND FOLIC ACID 1 TABLET: 3080; 920; 120; 400; 22; 1.84; 3; 20; 10; 1; 12; 200; 27; 25; 2 TABLET ORAL at 08:06

## 2023-03-07 RX ADMIN — ACETAMINOPHEN 1000 MG: 500 TABLET ORAL at 03:00

## 2023-03-07 RX ADMIN — METOCLOPRAMIDE 10 MG: 5 TABLET ORAL at 08:06

## 2023-03-07 ASSESSMENT — PAIN DESCRIPTION - DESCRIPTORS
DESCRIPTORS: ACHING
DESCRIPTORS: ACHING
DESCRIPTORS: ACHING;SORE
DESCRIPTORS: ACHING

## 2023-03-07 ASSESSMENT — PAIN SCALES - GENERAL
PAINLEVEL_OUTOF10: 9
PAINLEVEL_OUTOF10: 6
PAINLEVEL_OUTOF10: 0
PAINLEVEL_OUTOF10: 7
PAINLEVEL_OUTOF10: 4
PAINLEVEL_OUTOF10: 6

## 2023-03-07 ASSESSMENT — PAIN DESCRIPTION - ORIENTATION
ORIENTATION: MID;LOWER
ORIENTATION: MID;LOWER
ORIENTATION: LOWER
ORIENTATION: MID;LOWER

## 2023-03-07 ASSESSMENT — PAIN DESCRIPTION - LOCATION
LOCATION: ABDOMEN
LOCATION: ABDOMEN;LEG
LOCATION: ABDOMEN

## 2023-03-07 ASSESSMENT — PAIN - FUNCTIONAL ASSESSMENT
PAIN_FUNCTIONAL_ASSESSMENT: ACTIVITIES ARE NOT PREVENTED
PAIN_FUNCTIONAL_ASSESSMENT: 0-10
PAIN_FUNCTIONAL_ASSESSMENT: 0-10
PAIN_FUNCTIONAL_ASSESSMENT: ACTIVITIES ARE NOT PREVENTED

## 2023-03-07 NOTE — PROGRESS NOTES
Assumed care of pt for 11-7 shift. First contact with pt. Plan of care for night discussed. Pt verbalizes understanding. Safe sleep practices reviewed and discussed. Mother aware of 's need to sleep in crib. No complaints of pain. Ambulating in hallways.

## 2023-03-07 NOTE — DISCHARGE SUMMARY
[address]                               DISCHARGE SUMMARY    PATIENT NAME: Krystle Beavers                :        1995  MED REC NO:   [id number]                            ROOM:       0216  ACCOUNT NO:   [de-identified]                           ADMIT DATE: 2023. .. PROVIDER:     Emilee Bhatia MD                  DISCHARGE DATE:    ADMITTING DIAGNOSES:  Intrauterine pregnancy at 39 weeks with pelvic  pain. Previous  x2, declining . DISCHARGE DIAGNOSES:  Intrauterine pregnancy at 39 weeks with pelvic  pain. Previous  x2, declining . PROCEDURE PERFORMED:  Repeat low-transverse . HOSPITAL COURSE IN DETAIL:  The patient is doing well. Voiding well. Minimal lochia. Pain is under adequate control. Her admission labs  revealed a creatinine of 0.6, ALT 10, AST 17, glucose 85. White count  5, hemoglobin 9, platelets 890,355. Postop day #1 hemoglobin is 9.3. She is afebrile. Heart rate is 59-69. Blood pressure  over  64-68. O2 sat 97% on room air. Urine output is 1350 to 930 mL per  shift. Lungs:  Clear to auscultation. CV:  Regular rate and rhythm. Abdomen:  Obese, soft, nondistended, appropriately tender. No rebound  or guarding. Fundus is firm and two fingerbreadths below the umbilicus. Incision with some oozing. Perineum dry and intact. ASSESSMENT:  The patient doing well, postop day #1. Stable. Some oozing at incision. PLAN: Hemoglobin. Monitor incision. Home once meets discharge criteria with fever, bleeding, emboli,  lifting, climbing, driving precautions. She is to follow up at the  office in six weeks. She indicates understanding of all instructions.         Denise Payne MD    D: 2023 18:15:46       T: 2023 18:18:09     [email or web address]  Job#: [id number]     Doc#: [id number]    CC:

## 2023-03-07 NOTE — PLAN OF CARE
Problem: Vaginal Birth or  Section  Goal: Able to cope with pain  Description: Able to cope with pain  3/7/2023 0948 by Ulices Guo RN  Outcome: Progressing  3/7/2023 0012 by Martha Suarez RN  Outcome: Progressing     Problem: Pain  Goal: Verbalizes/displays adequate comfort level or baseline comfort level  Outcome: Progressing

## 2023-03-07 NOTE — PLAN OF CARE
Problem: Vaginal Birth or  Section  Goal: Able to cope with pain  Description: Able to cope with pain  Outcome: Progressing     Problem: Postpartum  Goal: Able to ambulate independently  Description: Able to ambulate independently  Outcome: Progressing

## 2023-03-07 NOTE — CARE COORDINATION
3/7/2023: SS Note:  Notified by [facility] cw, Cassandra Ac that she is aware of pt's delivery and that her  is back in the home but that there were no safety concerns for her children or  & that pt will be receiving a letter in the mail that agency has closed their case. Nursing notified. Electronically signed by OCTAVIA Jorge on 3/7/2023 at 1:30 PM

## 2023-03-07 NOTE — PROGRESS NOTES
Doing well. Voiding well. Minimal lochia. Pain under adequate control. No complaints. No nausea or vomiting. Had some oozing from incision, which has subsided. Hb stable at 9.6    Passing flatus    Vital signs stable. Afebrile    Lungs CTA  CV RRR  Abdomen soft, non-distended. Normal bowel sounds present. No rebound or guarding. Fundus firm and 2 finger breadths below umbilicus  Extremities no clubbing, cyanosis, cords, erythema    Incision dry and intact    A: Doing well postpartum. Stable. P: Home with previously indicated precautions once meets discharge criteria.

## 2023-03-07 NOTE — ANESTHESIA POSTPROCEDURE EVALUATION
Department of Anesthesiology  Postprocedure Note    Patient: Hanh Urban  MRN: [id number]  YOB: 1995  Date of evaluation: 3/7/2023      Procedure Summary     Date: 23 Room / Location: [address] [place] [address]    Anesthesia Start: 5777 Anesthesia Stop: 1442    Procedure: REPEAT  SECTION (Abdomen) Diagnosis:       Previous  section      (Previous  section [T06.636])    Surgeons: Doug Gallagher MD Responsible Provider: Ana Briones MD    Anesthesia Type: spinal ASA Status: 3 - Emergent          Anesthesia Type: No value filed.     Zenobia Phase I: Zenobia Score: 10    Zenobia Phase II:        Anesthesia Post Evaluation    Patient location during evaluation: bedside  Patient participation: complete - patient participated  Level of consciousness: awake  Pain score: 0  Airway patency: patent  Nausea & Vomiting: no nausea and no vomiting  Complications: no  Cardiovascular status: hemodynamically stable  Respiratory status: acceptable  Hydration status: euvolemic

## 2023-03-08 VITALS
RESPIRATION RATE: 16 BRPM | OXYGEN SATURATION: 98 % | TEMPERATURE: 98 F | DIASTOLIC BLOOD PRESSURE: 78 MMHG | SYSTOLIC BLOOD PRESSURE: 115 MMHG | HEART RATE: 99 BPM

## 2023-03-08 PROCEDURE — 6370000000 HC RX 637 (ALT 250 FOR IP): Performed by: LEGAL MEDICINE

## 2023-03-08 RX ADMIN — FERROUS SULFATE TAB 325 MG (65 MG ELEMENTAL FE) 325 MG: 325 (65 FE) TAB at 08:00

## 2023-03-08 RX ADMIN — OXYCODONE HYDROCHLORIDE 10 MG: 5 TABLET ORAL at 07:59

## 2023-03-08 RX ADMIN — ACETAMINOPHEN 1000 MG: 500 TABLET ORAL at 05:32

## 2023-03-08 RX ADMIN — METOCLOPRAMIDE 10 MG: 5 TABLET ORAL at 08:00

## 2023-03-08 RX ADMIN — PRENATAL WITH FERROUS FUM AND FOLIC ACID 1 TABLET: 3080; 920; 120; 400; 22; 1.84; 3; 20; 10; 1; 12; 200; 27; 25; 2 TABLET ORAL at 07:59

## 2023-03-08 RX ADMIN — DOCUSATE SODIUM 100 MG: 100 CAPSULE, LIQUID FILLED ORAL at 08:00

## 2023-03-08 ASSESSMENT — PAIN DESCRIPTION - LOCATION
LOCATION: ABDOMEN
LOCATION: ABDOMEN

## 2023-03-08 ASSESSMENT — PAIN - FUNCTIONAL ASSESSMENT
PAIN_FUNCTIONAL_ASSESSMENT: ACTIVITIES ARE NOT PREVENTED
PAIN_FUNCTIONAL_ASSESSMENT: 0-10

## 2023-03-08 ASSESSMENT — PAIN SCALES - GENERAL
PAINLEVEL_OUTOF10: 4
PAINLEVEL_OUTOF10: 5

## 2023-03-08 ASSESSMENT — PAIN DESCRIPTION - DESCRIPTORS
DESCRIPTORS: TENDER
DESCRIPTORS: DISCOMFORT

## 2023-03-08 ASSESSMENT — PAIN DESCRIPTION - ORIENTATION: ORIENTATION: MID;LOWER

## 2023-03-08 NOTE — PROGRESS NOTES
Assumed care of pt for 11-7 shift. First contact with pt. Plan of care for night discussed. Pt verbalizes understanding. Medicated per request. Safe sleep practices reviewed and discussed. Pt verbalizes understanding of need for  baby to sleep in crib. Rest encouraged.

## 2023-03-08 NOTE — PLAN OF CARE
Problem: Vaginal Birth or  Section  Goal: Able to cope with pain  Description: Able to cope with pain  Outcome: Completed     Problem: Postpartum  Goal: Able to ambulate independently  Description: Able to ambulate independently  Outcome: Progressing     Problem: Pain  Goal: Verbalizes/displays adequate comfort level or baseline comfort level  Outcome: Progressing

## 2023-03-08 NOTE — PLAN OF CARE
Problem: Postpartum  Goal: Able to ambulate independently  Description: Able to ambulate independently  3/8/2023 0832 by Daylin Osullivan RN  Outcome: Progressing  3/8/2023 0018 by Pastor Breezy RN  Outcome: Progressing     Problem: Pain  Goal: Verbalizes/displays adequate comfort level or baseline comfort level  3/8/2023 0832 by Daylin Osullivan RN  Outcome: Progressing  3/8/2023 0018 by Pastor Breezy RN  Outcome: Progressing

## 2023-03-08 NOTE — PROGRESS NOTES
Assuming care of pt. Report received from previous RN. [place] in [facility] at bedside. Pt denies any needs at this time. Call light in reach. Stable

## 2023-04-26 ENCOUNTER — HOSPITAL ENCOUNTER (EMERGENCY)
Age: 28
Discharge: HOME | End: 2023-04-26
Payer: COMMERCIAL

## 2023-04-26 VITALS
OXYGEN SATURATION: 98 % | RESPIRATION RATE: 16 BRPM | DIASTOLIC BLOOD PRESSURE: 71 MMHG | SYSTOLIC BLOOD PRESSURE: 117 MMHG | WEIGHT: 202 LBS | TEMPERATURE: 97.9 F | HEART RATE: 71 BPM | BODY MASS INDEX: 31.64 KG/M2

## 2023-04-26 DIAGNOSIS — K64.5 EXTERNAL THROMBOSED HEMORRHOIDS: Primary | ICD-10-CM

## 2023-04-26 PROCEDURE — 99283 EMERGENCY DEPT VISIT LOW MDM: CPT

## 2023-04-26 PROCEDURE — 46083 INC THROMBOSED HROID XTRNL: CPT

## 2023-04-26 RX ORDER — LIDOCAINE HYDROCHLORIDE AND EPINEPHRINE 10; 10 MG/ML; UG/ML
10 INJECTION, SOLUTION INFILTRATION; PERINEURAL ONCE
Status: DISCONTINUED | OUTPATIENT
Start: 2023-04-26 | End: 2023-04-27 | Stop reason: HOSPADM

## 2023-04-26 RX ORDER — HYDROCORTISONE 10 MG/G
CREAM TOPICAL
Qty: 28 G | Refills: 0 | Status: SHIPPED | OUTPATIENT
Start: 2023-04-26

## 2023-04-26 ASSESSMENT — LIFESTYLE VARIABLES: HOW MANY STANDARD DRINKS CONTAINING ALCOHOL DO YOU HAVE ON A TYPICAL DAY: PATIENT DOES NOT DRINK

## 2023-04-26 ASSESSMENT — PAIN - FUNCTIONAL ASSESSMENT
PAIN_FUNCTIONAL_ASSESSMENT: 0-10
PAIN_FUNCTIONAL_ASSESSMENT: 0-10

## 2023-04-26 ASSESSMENT — PAIN SCALES - GENERAL
PAINLEVEL_OUTOF10: 5
PAINLEVEL_OUTOF10: 8

## 2023-04-26 ASSESSMENT — PAIN DESCRIPTION - LOCATION: LOCATION: RECTUM

## 2023-04-26 NOTE — ED PROVIDER NOTES
Independent MARGO Visit. HPI:  23, Time: 5:39 PM EDT         Bon Arriaza is a 32 y.o. female presenting to the ED for painful hemorrhoid, beginning this morning ago. The complaint has been persistent, moderate in severity, and worsened by sitting, walking. Patient reports that she has a known history of hemorrhoids however several times has had to have them drained as they have been thrombosed. This feels similar to when they have been thrombosed in the past.  Very painful. Worse with bowel movements and sitting. Afebrile without recent travel or sick contacts. Patient reports that she is about 6 weeks postpartum from a . Overall healing well from this. No vaginal bleeding or discharge. Patient denies all other symptoms at this time. Review of Systems:   A complete review of systems was performed and pertinent positives and negatives are stated within HPI, all other systems reviewed and are negative.          --------------------------------------------- PAST HISTORY ---------------------------------------------  Past Medical History:  has a past medical history of Depression, Endometriosis of pelvis, Miscarriage, Prolonged emergence from general anesthesia, and Wound infection. Past Surgical History:  has a past surgical history that includes Tonsillectomy; Myringotomy Tympanostomy Tube Placement; Middle ear surgery;  section; laparoscopy (2017); pr laparoscopy w/lysis of adhesions (N/A, 2018); Dilation and curettage of uterus (N/A, 2019);  section (N/A, 2020); laparoscopy (N/A, 2022); Abdomen surgery; and  section (N/A, 3/5/2023). Social History:  reports that she has been smoking cigarettes. She has never used smokeless tobacco. She reports that she does not drink alcohol and does not use drugs. Family History: family history is not on file. The patients home medications have been reviewed.     Allergies: Pcn

## 2023-08-30 ENCOUNTER — HOSPITAL ENCOUNTER (OUTPATIENT)
Age: 28
Discharge: HOME | End: 2023-08-30
Payer: COMMERCIAL

## 2023-08-30 LAB
25(OH)D3 SERPL-MCNC: 31.6 NG/ML (ref 30–100)
ALBUMIN SERPL-MCNC: 4.6 G/DL (ref 3.5–5.2)
ALP SERPL-CCNC: 67 U/L (ref 35–104)
ALT SERPL-CCNC: 13 U/L (ref 0–32)
AMPHET UR QL SCN: NEGATIVE
ANION GAP SERPL CALCULATED.3IONS-SCNC: 10 MMOL/L (ref 7–16)
AST SERPL-CCNC: 17 U/L (ref 0–31)
B-HCG SERPL EIA 3RD IS-ACNC: <0.1 MIU/ML (ref 0–7)
BARBITURATES UR QL SCN: NEGATIVE
BASOPHILS # BLD: 0.02 K/UL (ref 0–0.2)
BASOPHILS NFR BLD: 0 % (ref 0–2)
BENZODIAZ UR QL: NEGATIVE
BILIRUB SERPL-MCNC: 0.3 MG/DL (ref 0–1.2)
BUN SERPL-MCNC: 17 MG/DL (ref 6–20)
BUPRENORPHINE UR QL: NEGATIVE
CALCIUM SERPL-MCNC: 9.2 MG/DL (ref 8.6–10.2)
CANNABINOIDS UR QL SCN: NEGATIVE
CHLORIDE SERPL-SCNC: 103 MMOL/L (ref 98–107)
CHOLEST SERPL-MCNC: 170 MG/DL
CO2 SERPL-SCNC: 25 MMOL/L (ref 22–29)
COCAINE UR QL SCN: NEGATIVE
CREAT SERPL-MCNC: 0.6 MG/DL (ref 0.5–1)
EOSINOPHIL # BLD: 0.09 K/UL (ref 0.05–0.5)
EOSINOPHILS RELATIVE PERCENT: 2 % (ref 0–6)
ERYTHROCYTE [DISTWIDTH] IN BLOOD BY AUTOMATED COUNT: 16.1 % (ref 11.5–15)
FENTANYL UR QL: NEGATIVE
GFR SERPL CREATININE-BSD FRML MDRD: >60 ML/MIN/1.73M2
GLUCOSE P FAST SERPL-MCNC: 97 MG/DL (ref 74–99)
HBA1C MFR BLD: 4.9 % (ref 4–5.6)
HCT VFR BLD AUTO: 33.6 % (ref 34–48)
HDLC SERPL-MCNC: 50 MG/DL
HGB BLD-MCNC: 10.2 G/DL (ref 11.5–15.5)
IMM GRANULOCYTES # BLD AUTO: <0.03 K/UL (ref 0–0.58)
IMM GRANULOCYTES NFR BLD: 0 % (ref 0–5)
LDLC SERPL CALC-MCNC: 102 MG/DL
LYMPHOCYTES NFR BLD: 0.93 K/UL (ref 1.5–4)
LYMPHOCYTES RELATIVE PERCENT: 20 % (ref 20–42)
MCH RBC QN AUTO: 26 PG (ref 26–35)
MCHC RBC AUTO-ENTMCNC: 30.4 G/DL (ref 32–34.5)
MCV RBC AUTO: 85.5 FL (ref 80–99.9)
METHADONE UR QL: NEGATIVE
MONOCYTES NFR BLD: 0.41 K/UL (ref 0.1–0.95)
MONOCYTES NFR BLD: 9 % (ref 2–12)
NEUTROPHILS NFR BLD: 69 % (ref 43–80)
NEUTS SEG NFR BLD: 3.22 K/UL (ref 1.8–7.3)
OPIATES UR QL SCN: NEGATIVE
OXYCODONE UR QL SCN: NEGATIVE
PCP UR QL SCN: NEGATIVE
PLATELET # BLD AUTO: 251 K/UL (ref 130–450)
PMV BLD AUTO: 10.3 FL (ref 7–12)
POTASSIUM SERPL-SCNC: 4.3 MMOL/L (ref 3.5–5)
PROT SERPL-MCNC: 7.6 G/DL (ref 6.4–8.3)
RBC # BLD AUTO: 3.93 M/UL (ref 3.5–5.5)
SODIUM SERPL-SCNC: 138 MMOL/L (ref 132–146)
T3FREE SERPL-MCNC: 3.61 PG/ML (ref 2–4.4)
T4 FREE SERPL-MCNC: 1.3 NG/DL (ref 0.9–1.7)
TEST INFORMATION: NORMAL
TRIGL SERPL-MCNC: 89 MG/DL
TSH SERPL DL<=0.05 MIU/L-ACNC: 0.55 UIU/ML (ref 0.27–4.2)
VIT B12 SERPL-MCNC: 364 PG/ML (ref 211–946)
VLDLC SERPL CALC-MCNC: 18 MG/DL
WBC OTHER # BLD: 4.7 K/UL (ref 4.5–11.5)

## 2023-08-30 PROCEDURE — 84481 FREE ASSAY (FT-3): CPT

## 2023-08-30 PROCEDURE — 86702 HIV-2 ANTIBODY: CPT

## 2023-08-30 PROCEDURE — 87522 HEPATITIS C REVRS TRNSCRPJ: CPT

## 2023-08-30 PROCEDURE — 36415 COLL VENOUS BLD VENIPUNCTURE: CPT

## 2023-08-30 PROCEDURE — 82607 VITAMIN B-12: CPT

## 2023-08-30 PROCEDURE — 82652 VIT D 1 25-DIHYDROXY: CPT

## 2023-08-30 PROCEDURE — 86701 HIV-1ANTIBODY: CPT

## 2023-08-30 PROCEDURE — 86317 IMMUNOASSAY INFECTIOUS AGENT: CPT

## 2023-08-30 PROCEDURE — 84702 CHORIONIC GONADOTROPIN TEST: CPT

## 2023-08-30 PROCEDURE — 84443 ASSAY THYROID STIM HORMONE: CPT

## 2023-08-30 PROCEDURE — 80053 COMPREHEN METABOLIC PANEL: CPT

## 2023-08-30 PROCEDURE — 84439 ASSAY OF FREE THYROXINE: CPT

## 2023-08-30 PROCEDURE — 82306 VITAMIN D 25 HYDROXY: CPT

## 2023-08-30 PROCEDURE — 80061 LIPID PANEL: CPT

## 2023-08-30 PROCEDURE — 83036 HEMOGLOBIN GLYCOSYLATED A1C: CPT

## 2023-08-30 PROCEDURE — 80307 DRUG TEST PRSMV CHEM ANLYZR: CPT

## 2023-08-30 PROCEDURE — 85025 COMPLETE CBC W/AUTO DIFF WBC: CPT

## 2023-08-31 LAB — HBV SURFACE AB SERPL IA-ACNC: 6.92 MIU/ML (ref 0–9.99)

## 2023-09-01 LAB
1,25(OH)2D3 SERPL-MCNC: 69.6 PG/ML (ref 19.9–79.3)
HIV 1 & 2 AB SERPLBLD IA.RAPID: NEGATIVE
HIV 2 AB SERPLBLD QL IA.RAPID: NEGATIVE
HIV1 AB SERPLBLD QL IA.RAPID: NEGATIVE

## 2023-09-03 LAB
HCV RNA # SERPL NAA+PROBE: NOT DETECTED {COPIES}/ML
SPECIMEN SOURCE: NORMAL

## 2023-11-28 ENCOUNTER — HOSPITAL ENCOUNTER (EMERGENCY)
Age: 28
Discharge: HOME | End: 2023-11-28
Payer: COMMERCIAL

## 2023-11-28 PROCEDURE — 99283 EMERGENCY DEPT VISIT LOW MDM: CPT

## 2024-01-20 NOTE — PROGRESS NOTES
Мария Peters     Patient presents for urinary frequency for 2 weeks.  She drinks a lot of pop.  She has a history of gestational diabetes.  No problems with her bowel movements.  Passing flatus.  No nausea or vomiting.  No dysuria.  No hematuria.    Her cycle length is 1 month.  Occasionally her cycle length is 2 weeks.  She does have dysmenorrhea.  She has a history of pelvic endometriosis.  Denies any hot flashes.  The flow is heavy, and remains unchanged.          Past Medical History:   Diagnosis Date    Depression     ADHD    Endometriosis of pelvis 2018    Miscarriage     Prolonged emergence from general anesthesia     slow to wake up after c section    Wound infection 10/7/2020        Past Surgical History:   Procedure Laterality Date    ABDOMEN SURGERY       SECTION       SECTION N/A 2020    REPEAT  SECTION performed by Dianne Meredith MD at [facility] L[facility]D OR     SECTION N/A 3/5/2023    REPEAT  SECTION performed by Dianne Meredith MD at [facility] L&D OR    DILATION AND CURETTAGE OF UTERUS N/A 2019    DILATATION AND CURETTAGE SUCTION performed by Dianne Meredith MD at [facility] OR    LAPAROSCOPY  2017    LAPAROSCOPY N/A 2022    LAPAROSCOPY RIGHT OVARIAN CYSTECTOMY FULGERATION OF PELVIC ENDOMETRIOSIS performed by Dianne Meredith MD at [facility] OR    MIDDLE EAR SURGERY      MYRINGOTOMY AND TYMPANOSTOMY TUBE PLACEMENT      NE LAPAROSCOPY W/LYSIS OF ADHESIONS N/A 2018    DIAGNOSTIC LAPAROSCOPY. aspiration of left ovarian cyst performed by Nyasia Garcia MD at [facility] OR    TONSILLECTOMY          No family history on file.     Social History       Tobacco History       Smoking Status  Every Day Smoking Tobacco Type  Cigarettes      Smokeless Tobacco Use  Never              Alcohol History       Alcohol Use Status  No              Drug Use       Drug Use Status  No              Sexual Activity       Sexually Active  Yes Partners  Male

## 2024-01-23 ENCOUNTER — HOSPITAL ENCOUNTER (OUTPATIENT)
Age: 29
Discharge: HOME | End: 2024-01-23
Payer: COMMERCIAL

## 2024-01-23 ENCOUNTER — OFFICE VISIT (OUTPATIENT)
Age: 29
End: 2024-01-23
Payer: COMMERCIAL

## 2024-01-23 VITALS
BODY MASS INDEX: 32.41 KG/M2 | DIASTOLIC BLOOD PRESSURE: 70 MMHG | WEIGHT: 206.9 LBS | HEART RATE: 78 BPM | SYSTOLIC BLOOD PRESSURE: 105 MMHG

## 2024-01-23 DIAGNOSIS — N92.0 EXCESSIVE OR FREQUENT MENSTRUATION: ICD-10-CM

## 2024-01-23 DIAGNOSIS — R10.31 BILATERAL LOWER ABDOMINAL PAIN: ICD-10-CM

## 2024-01-23 DIAGNOSIS — R10.32 BILATERAL LOWER ABDOMINAL PAIN: ICD-10-CM

## 2024-01-23 DIAGNOSIS — R35.0 URINARY FREQUENCY: ICD-10-CM

## 2024-01-23 DIAGNOSIS — O24.419 GESTATIONAL DIABETES MELLITUS (GDM) AFFECTING THIRD PREGNANCY: ICD-10-CM

## 2024-01-23 DIAGNOSIS — N89.8 VAGINAL DISCHARGE: ICD-10-CM

## 2024-01-23 DIAGNOSIS — Z12.4 CERVICAL CANCER SCREENING: Primary | ICD-10-CM

## 2024-01-23 LAB
ANION GAP SERPL CALCULATED.3IONS-SCNC: 8 MMOL/L (ref 7–16)
BUN SERPL-MCNC: 12 MG/DL (ref 6–20)
CALCIUM SERPL-MCNC: 9 MG/DL (ref 8.6–10.2)
CHLORIDE SERPL-SCNC: 105 MMOL/L (ref 98–107)
CO2 SERPL-SCNC: 26 MMOL/L (ref 22–29)
CREAT SERPL-MCNC: 0.7 MG/DL (ref 0.5–1)
GFR SERPL CREATININE-BSD FRML MDRD: >60 ML/MIN/1.73M2
GLUCOSE SERPL-MCNC: 90 MG/DL (ref 74–99)
HBA1C MFR BLD: 5 % (ref 4–5.6)
POTASSIUM SERPL-SCNC: 4.4 MMOL/L (ref 3.5–5)
SODIUM SERPL-SCNC: 139 MMOL/L (ref 132–146)
TSH SERPL DL<=0.05 MIU/L-ACNC: 0.81 UIU/ML (ref 0.27–4.2)

## 2024-01-23 PROCEDURE — 80048 BASIC METABOLIC PNL TOTAL CA: CPT

## 2024-01-23 PROCEDURE — 83036 HEMOGLOBIN GLYCOSYLATED A1C: CPT

## 2024-01-23 PROCEDURE — 36415 COLL VENOUS BLD VENIPUNCTURE: CPT

## 2024-01-23 PROCEDURE — G8427 DOCREV CUR MEDS BY ELIG CLIN: HCPCS | Performed by: LEGAL MEDICINE

## 2024-01-23 PROCEDURE — G8417 CALC BMI ABV UP PARAM F/U: HCPCS | Performed by: LEGAL MEDICINE

## 2024-01-23 PROCEDURE — 84443 ASSAY THYROID STIM HORMONE: CPT

## 2024-01-23 PROCEDURE — G8484 FLU IMMUNIZE NO ADMIN: HCPCS | Performed by: LEGAL MEDICINE

## 2024-01-23 PROCEDURE — 4004F PT TOBACCO SCREEN RCVD TLK: CPT | Performed by: LEGAL MEDICINE

## 2024-01-23 PROCEDURE — 99214 OFFICE O/P EST MOD 30 MIN: CPT | Performed by: LEGAL MEDICINE

## 2024-01-23 RX ORDER — DULOXETINE 40 MG/1
40 CAPSULE, DELAYED RELEASE ORAL DAILY
COMMUNITY

## 2024-01-23 RX ORDER — METRONIDAZOLE 500 MG/1
500 TABLET ORAL
Qty: 14 TABLET | Refills: 0 | Status: SHIPPED | OUTPATIENT
Start: 2024-01-23 | End: 2024-01-30

## 2024-01-23 SDOH — ECONOMIC STABILITY: INCOME INSECURITY: HOW HARD IS IT FOR YOU TO PAY FOR THE VERY BASICS LIKE FOOD, HOUSING, MEDICAL CARE, AND HEATING?: NOT HARD AT ALL

## 2024-01-23 SDOH — ECONOMIC STABILITY: HOUSING INSECURITY
IN THE LAST 12 MONTHS, WAS THERE A TIME WHEN YOU DID NOT HAVE A STEADY PLACE TO SLEEP OR SLEPT IN A SHELTER (INCLUDING NOW)?: NO

## 2024-01-23 SDOH — ECONOMIC STABILITY: FOOD INSECURITY: WITHIN THE PAST 12 MONTHS, YOU WORRIED THAT YOUR FOOD WOULD RUN OUT BEFORE YOU GOT MONEY TO BUY MORE.: NEVER TRUE

## 2024-01-23 SDOH — ECONOMIC STABILITY: FOOD INSECURITY: WITHIN THE PAST 12 MONTHS, THE FOOD YOU BOUGHT JUST DIDN'T LAST AND YOU DIDN'T HAVE MONEY TO GET MORE.: NEVER TRUE

## 2024-01-23 ASSESSMENT — ENCOUNTER SYMPTOMS
DIARRHEA: 0
ABDOMINAL DISTENTION: 0
VOMITING: 0
ABDOMINAL PAIN: 0
BLOOD IN STOOL: 0
CONSTIPATION: 0
NAUSEA: 0

## 2024-01-23 ASSESSMENT — PATIENT HEALTH QUESTIONNAIRE - PHQ9
2. FEELING DOWN, DEPRESSED OR HOPELESS: 0
SUM OF ALL RESPONSES TO PHQ QUESTIONS 1-9: 0
SUM OF ALL RESPONSES TO PHQ9 QUESTIONS 1 & 2: 0
SUM OF ALL RESPONSES TO PHQ QUESTIONS 1-9: 0
1. LITTLE INTEREST OR PLEASURE IN DOING THINGS: 0

## 2024-01-23 NOTE — PATIENT INSTRUCTIONS
Please have the studies ordered in the next   weeks.    For ultrasound or any x-rays, you can have them done at [facility].  You will need to call the radiology department to schedule those.  Please make sure my staff gives you the phone number for radiology before you leave.  If you opt to have the studies done at a different facility, please ask that facility to fax your results to my office.  The fax number here is: [phone].    For labs, you may have them done at [facility].  They are open 8 AM to 5 PM Monday through Friday.  If you opt to have your labs done at a different facility, please ask the facility to fax your results to my office.  The fax number here is: [phone].    I will call you with the reports.      Please call the office if I have not contacted you with the reports by 2 weeks after you have had them done.    Let me know if you decide on Depo-Provera injections or birth control pills to help with the abnormal bleeding and pain, or if you wish to use other treatments.

## 2024-01-25 ENCOUNTER — TELEPHONE (OUTPATIENT)
Age: 29
End: 2024-01-25

## 2024-01-25 DIAGNOSIS — N92.0 EXCESSIVE OR FREQUENT MENSTRUATION: ICD-10-CM

## 2024-01-25 DIAGNOSIS — N89.8 VAGINAL DISCHARGE: ICD-10-CM

## 2024-01-25 DIAGNOSIS — R10.31 BILATERAL LOWER ABDOMINAL PAIN: ICD-10-CM

## 2024-01-25 DIAGNOSIS — R10.32 BILATERAL LOWER ABDOMINAL PAIN: ICD-10-CM

## 2024-01-25 RX ORDER — NITROFURANTOIN 25; 75 MG/1; MG/1
100 CAPSULE ORAL 2 TIMES DAILY
Qty: 14 CAPSULE | Refills: 0 | Status: SHIPPED | OUTPATIENT
Start: 2024-01-25 | End: 2024-02-01

## 2024-01-25 NOTE — TELEPHONE ENCOUNTER
Please call patient 1/25/24. Urine culture shows  bladder infection. Order for antibiotics sent to her pharmacy.

## 2024-01-25 NOTE — TELEPHONE ENCOUNTER
Called patient and LVM informing her of positive UTI and an antibiotic was called into her pharmacy. Call office if any concerns.

## 2024-01-29 ENCOUNTER — TELEPHONE (OUTPATIENT)
Age: 29
End: 2024-01-29

## 2024-01-29 NOTE — TELEPHONE ENCOUNTER
----- Message from Dianne Meredith MD sent at 1/29/2024 10:30 AM EST -----  Please call patient. Pap with mild cervical dysplasia. Will need cervical colpo in next 2-4 weeks.

## 2024-01-30 LAB
GYNECOLOGY CYTOLOGY REPORT: NORMAL
HPV SAMPLE: NORMAL
HPV SOURCE: NORMAL
HPV, GENOTYPE 16: NOT DETECTED
HPV, GENOTYPE 18: NOT DETECTED
HPV, HIGH RISK OTHER: NOT DETECTED
HPV, INTERPRETATION: NORMAL

## 2024-03-06 ENCOUNTER — HOSPITAL ENCOUNTER (EMERGENCY)
Age: 29
Discharge: HOME | End: 2024-03-06
Payer: COMMERCIAL

## 2024-03-06 VITALS
OXYGEN SATURATION: 98 % | TEMPERATURE: 98.1 F | SYSTOLIC BLOOD PRESSURE: 118 MMHG | HEART RATE: 108 BPM | DIASTOLIC BLOOD PRESSURE: 71 MMHG | BODY MASS INDEX: 32.89 KG/M2 | WEIGHT: 210 LBS | RESPIRATION RATE: 18 BRPM

## 2024-03-06 DIAGNOSIS — J10.1 INFLUENZA B: Primary | ICD-10-CM

## 2024-03-06 LAB
INFLUENZA A BY PCR: NOT DETECTED
INFLUENZA B BY PCR: DETECTED
SARS-COV-2 RDRP RESP QL NAA+PROBE: NOT DETECTED
SPECIMEN DESCRIPTION: NORMAL
SPECIMEN SOURCE: NORMAL
STREP A, MOLECULAR: NEGATIVE

## 2024-03-06 PROCEDURE — 99211 OFF/OP EST MAY X REQ PHY/QHP: CPT

## 2024-03-06 PROCEDURE — 87651 STREP A DNA AMP PROBE: CPT

## 2024-03-06 PROCEDURE — 87502 INFLUENZA DNA AMP PROBE: CPT

## 2024-03-06 PROCEDURE — 87635 SARS-COV-2 COVID-19 AMP PRB: CPT

## 2024-03-07 NOTE — ED PROVIDER NOTES
Department of Emergency Medicine   [facility]  Provider Note  Admit Date/RoomTime: 3/6/2024  7:12 PM  Room:   NAME: Мария Peters  : 1995  MRN: [id number]     Chief Complaint:  Cough (Congestion  sore throat  ear left   fever  )    History of Present Illness       Мария Peters is a 28 y.o. old female who presents to the urgent care center by private vehicle for evaluation.  She has been sick for at least 3  days she has congestion cough sore throat body aches and she says she has had a fever.  She said that her mother is here with the same type of symptoms and her family is all sick with these respiratory symptoms.  ROS    Pertinent positives and negatives are stated within HPI, all other systems reviewed and are negative.    Past Surgical History:   Procedure Laterality Date    ABDOMEN SURGERY       SECTION       SECTION N/A 2020    REPEAT  SECTION performed by Dianne Meredith MD at [facility] OR     SECTION N/A 3/5/2023    REPEAT  SECTION performed by Dianne Meredith MD at [facility] OR    DILATION AND CURETTAGE OF UTERUS N/A 2019    DILATATION AND CURETTAGE SUCTION performed by Dianne Meredith MD at [facility] OR    LAPAROSCOPY  2017    LAPAROSCOPY N/A 2022    LAPAROSCOPY RIGHT OVARIAN CYSTECTOMY FULGERATION OF PELVIC ENDOMETRIOSIS performed by Dianne Meredith MD at [facility] OR    MIDDLE EAR SURGERY      MYRINGOTOMY AND TYMPANOSTOMY TUBE PLACEMENT      WA LAPAROSCOPY W/LYSIS OF ADHESIONS N/A 2018    DIAGNOSTIC LAPAROSCOPY. aspiration of left ovarian cyst performed by Nyasia Garcia MD at [facility] OR    TONSILLECTOMY     Social History:  reports that she has been smoking cigarettes. She has never used smokeless tobacco. She reports that she does not drink alcohol and does not use drugs.  Family History: family history is not on file.   Allergies: Pcn [penicillins]    Physical Exam            ED Triage

## 2024-04-23 ENCOUNTER — PROCEDURE VISIT (OUTPATIENT)
Age: 29
End: 2024-04-23
Payer: COMMERCIAL

## 2024-04-23 ENCOUNTER — HOSPITAL ENCOUNTER (OUTPATIENT)
Age: 29
Discharge: HOME | End: 2024-04-23
Payer: COMMERCIAL

## 2024-04-23 VITALS
DIASTOLIC BLOOD PRESSURE: 73 MMHG | SYSTOLIC BLOOD PRESSURE: 108 MMHG | HEART RATE: 96 BPM | BODY MASS INDEX: 32.82 KG/M2 | WEIGHT: 209.1 LBS | HEIGHT: 67 IN

## 2024-04-23 DIAGNOSIS — N87.0 DYSPLASIA OF CERVIX, LOW GRADE (CIN 1): Primary | ICD-10-CM

## 2024-04-23 LAB
25(OH)D3 SERPL-MCNC: 30.6 NG/ML (ref 30–100)
ALBUMIN SERPL-MCNC: 4.4 G/DL (ref 3.5–5.2)
ALP SERPL-CCNC: 65 U/L (ref 35–104)
ALT SERPL-CCNC: 9 U/L (ref 0–32)
AMPHET UR QL SCN: NEGATIVE
ANION GAP SERPL CALCULATED.3IONS-SCNC: 12 MMOL/L (ref 7–16)
AST SERPL-CCNC: 15 U/L (ref 0–31)
B-HCG SERPL EIA 3RD IS-ACNC: <0.1 MIU/ML (ref 0–7)
BARBITURATES UR QL SCN: NEGATIVE
BASOPHILS # BLD: 0.02 K/UL (ref 0–0.2)
BASOPHILS NFR BLD: 0 % (ref 0–2)
BENZODIAZ UR QL: NEGATIVE
BILIRUB SERPL-MCNC: 0.4 MG/DL (ref 0–1.2)
BUN SERPL-MCNC: 15 MG/DL (ref 6–20)
BUPRENORPHINE UR QL: NEGATIVE
CALCIUM SERPL-MCNC: 9.1 MG/DL (ref 8.6–10.2)
CANNABINOIDS UR QL SCN: NEGATIVE
CHLORIDE SERPL-SCNC: 102 MMOL/L (ref 98–107)
CHOLEST SERPL-MCNC: 163 MG/DL
CO2 SERPL-SCNC: 22 MMOL/L (ref 22–29)
COCAINE UR QL SCN: NEGATIVE
CONTROL: NEGATIVE
CREAT SERPL-MCNC: 0.7 MG/DL (ref 0.5–1)
EOSINOPHIL # BLD: 0.1 K/UL (ref 0.05–0.5)
EOSINOPHILS RELATIVE PERCENT: 1 % (ref 0–6)
ERYTHROCYTE [DISTWIDTH] IN BLOOD BY AUTOMATED COUNT: 17.1 % (ref 11.5–15)
FENTANYL UR QL: NEGATIVE
GFR SERPL CREATININE-BSD FRML MDRD: >90 ML/MIN/1.73M2
GLUCOSE SERPL-MCNC: 89 MG/DL (ref 74–99)
HBA1C MFR BLD: 5.3 % (ref 4–5.6)
HCT VFR BLD AUTO: 34.5 % (ref 34–48)
HDLC SERPL-MCNC: 54 MG/DL
HGB BLD-MCNC: 11 G/DL (ref 11.5–15.5)
IMM GRANULOCYTES # BLD AUTO: <0.03 K/UL (ref 0–0.58)
IMM GRANULOCYTES NFR BLD: 0 % (ref 0–5)
LDLC SERPL CALC-MCNC: 92 MG/DL
LYMPHOCYTES NFR BLD: 1.52 K/UL (ref 1.5–4)
LYMPHOCYTES RELATIVE PERCENT: 22 % (ref 20–42)
MCH RBC QN AUTO: 26.1 PG (ref 26–35)
MCHC RBC AUTO-ENTMCNC: 31.9 G/DL (ref 32–34.5)
MCV RBC AUTO: 81.8 FL (ref 80–99.9)
METHADONE UR QL: NEGATIVE
MONOCYTES NFR BLD: 0.5 K/UL (ref 0.1–0.95)
MONOCYTES NFR BLD: 7 % (ref 2–12)
NEUTROPHILS NFR BLD: 69 % (ref 43–80)
NEUTS SEG NFR BLD: 4.84 K/UL (ref 1.8–7.3)
OPIATES UR QL SCN: NEGATIVE
OXYCODONE UR QL SCN: NEGATIVE
PCP UR QL SCN: NEGATIVE
PLATELET # BLD AUTO: 283 K/UL (ref 130–450)
PMV BLD AUTO: 10.4 FL (ref 7–12)
POTASSIUM SERPL-SCNC: 4.1 MMOL/L (ref 3.5–5)
PREGNANCY TEST URINE, POC: NEGATIVE
PROT SERPL-MCNC: 7.7 G/DL (ref 6.4–8.3)
RBC # BLD AUTO: 4.22 M/UL (ref 3.5–5.5)
SODIUM SERPL-SCNC: 136 MMOL/L (ref 132–146)
T3FREE SERPL-MCNC: 3.35 PG/ML (ref 2–4.4)
T4 FREE SERPL-MCNC: 1.2 NG/DL (ref 0.9–1.7)
TEST INFORMATION: NORMAL
TRIGL SERPL-MCNC: 83 MG/DL
TSH SERPL DL<=0.05 MIU/L-ACNC: 0.8 UIU/ML (ref 0.27–4.2)
VLDLC SERPL CALC-MCNC: 17 MG/DL
WBC OTHER # BLD: 7 K/UL (ref 4.5–11.5)

## 2024-04-23 PROCEDURE — 85025 COMPLETE CBC W/AUTO DIFF WBC: CPT

## 2024-04-23 PROCEDURE — 57454 BX/CURETT OF CERVIX W/SCOPE: CPT | Performed by: LEGAL MEDICINE

## 2024-04-23 PROCEDURE — 84439 ASSAY OF FREE THYROXINE: CPT

## 2024-04-23 PROCEDURE — 82306 VITAMIN D 25 HYDROXY: CPT

## 2024-04-23 PROCEDURE — 36415 COLL VENOUS BLD VENIPUNCTURE: CPT

## 2024-04-23 PROCEDURE — 80307 DRUG TEST PRSMV CHEM ANLYZR: CPT

## 2024-04-23 PROCEDURE — 81025 URINE PREGNANCY TEST: CPT | Performed by: LEGAL MEDICINE

## 2024-04-23 PROCEDURE — 84702 CHORIONIC GONADOTROPIN TEST: CPT

## 2024-04-23 PROCEDURE — 84481 FREE ASSAY (FT-3): CPT

## 2024-04-23 PROCEDURE — 80061 LIPID PANEL: CPT

## 2024-04-23 PROCEDURE — 84443 ASSAY THYROID STIM HORMONE: CPT

## 2024-04-23 PROCEDURE — 80053 COMPREHEN METABOLIC PANEL: CPT

## 2024-04-23 PROCEDURE — 83036 HEMOGLOBIN GLYCOSYLATED A1C: CPT

## 2024-04-23 NOTE — PATIENT INSTRUCTIONS
You may have a brown/black discharge with clumps for 2 weeks.    Do not put anything in the vagina for 2 weeks.    No sex, no douching, no tub baths, no medications per vagina, and no swimming for 2 weeks.    Do not use tampons for 1 month.

## 2024-04-23 NOTE — PROGRESS NOTES
Colposcopy Procedure Note    Indications: Pap smear showed: SOHA-1.  Pregnancy test is negative.    Procedure Details   The risks and benefits of the procedure and informed consent obtained.    Timeout:PERFORMED        Speculum placed in vagina and excellent visualization of cervix achieved, cervix swabbed copiously with acetic acid solution.    Reviewed nothing in the vagina (intercourse, tampons, baths, etc.) for 4 WEEKS.    Findings:  Cervix: Acetowhite lesion at 6:00.      Vaginal inspection: No lesion    Squamocolumnar junction: Seen    Transformation zone: Seen    Colposcopy:   SATISFACTORY    Specimens: Cervical biopsy, ECC    Complications: None    Plan:  Aftercare reviewed with her.  I will contact her with the report of the pathology.  If there is no evidence of high-grade JEREMY, we can cancel the 4-week follow-up and schedule a Pap in 6 months.    Dianne Meredith MD

## 2024-04-26 ENCOUNTER — TELEPHONE (OUTPATIENT)
Age: 29
End: 2024-04-26

## 2024-04-26 LAB — SURGICAL PATHOLOGY REPORT: NORMAL

## 2024-04-26 NOTE — TELEPHONE ENCOUNTER
----- Message from Dianne Meredith MD sent at 4/26/2024  9:42 AM EDT -----  Please call patient.4/26/24    Report shows mild cervical dysplasia. Can cancel 4 weeks follow up if no problems. For repeat pap 6 months.        Thank you

## 2024-08-22 ENCOUNTER — TELEPHONE (OUTPATIENT)
Age: 29
End: 2024-08-22

## 2024-08-22 NOTE — TELEPHONE ENCOUNTER
Pt phnd to schedule appt with any provider-pt last saw Dr Meredith and was advised due to abnormal pap in 4/2024 to have a repeat pap performed in 6mths.  Please call pt to schedule due to pt care [phone]

## 2024-08-26 ENCOUNTER — OFFICE VISIT (OUTPATIENT)
Age: 29
End: 2024-08-26

## 2024-08-26 VITALS
SYSTOLIC BLOOD PRESSURE: 108 MMHG | DIASTOLIC BLOOD PRESSURE: 74 MMHG | BODY MASS INDEX: 34.16 KG/M2 | WEIGHT: 218.1 LBS | HEART RATE: 102 BPM

## 2024-08-26 DIAGNOSIS — N80.9 ENDOMETRIOSIS DETERMINED BY LAPAROSCOPY: ICD-10-CM

## 2024-08-26 DIAGNOSIS — G89.29 CHRONIC PELVIC PAIN IN FEMALE: ICD-10-CM

## 2024-08-26 DIAGNOSIS — N94.19 DYSPAREUNIA DUE TO MEDICAL CONDITION IN FEMALE: ICD-10-CM

## 2024-08-26 DIAGNOSIS — R87.612 LGSIL ON PAP SMEAR OF CERVIX: Primary | ICD-10-CM

## 2024-08-26 DIAGNOSIS — R10.2 CHRONIC PELVIC PAIN IN FEMALE: ICD-10-CM

## 2024-08-26 ASSESSMENT — ENCOUNTER SYMPTOMS
RESPIRATORY NEGATIVE: 1
EYES NEGATIVE: 1
ALLERGIC/IMMUNOLOGIC NEGATIVE: 1

## 2024-08-26 NOTE — PROGRESS NOTES
Previous patient of Dr. Meredith repeat pap. Patient would like to discuss options for hysterectomy.  LMP 8/19/2024

## 2024-08-26 NOTE — PROGRESS NOTES
Progress Note  Date:2024       Room:[unfilled]  Patient Name:Мария Peters     YOB: 1995     Age:28 y.o.    28-year-old G4, P3 with 3 prior  section presents today for repeat Pap smear.  Patient prior Pap smear in September noted be LGSIL SOHA-1.  Repeat Pap smear performed today with adequate sampling visualization.  Patient has history of chronic pelvic pain secondary to endometriosis and also history of dyspareunia which has been relatively stable over the past year.  Patient states that her chronic pelvic pain after diagnosis of endometriosis via laparoscopy is somewhat limited her activities are on the past year.  Patient subsequently would like to discuss hysterectomy at some point due to continued issues with the diagnosis and failed medical management previously.    Subjective    Subjective:  Symptoms:  Stable.    Diet:  Adequate intake.    Activity level: Normal.    Pain:  She complains of pain that is mild.  She reports pain is unchanged.  Pain is partially controlled.       Review of Systems   Constitutional: Negative.    HENT: Negative.     Eyes: Negative.    Respiratory: Negative.     Endocrine: Negative.    Genitourinary:  Positive for pelvic pain.   Musculoskeletal: Negative.    Skin: Negative.    Allergic/Immunologic: Negative.    Neurological: Negative.    Hematological: Negative.    Psychiatric/Behavioral: Negative.       Objective         Vitals Last 24 Hours:  TEMPERATURE:  @FLOWSTAT(6:24)@  RESPIRATIONS RANGE: @FLOWSTAT(9:24)@  PULSE OXIMETRY RANGE: @FLOWSTAT(10:24)@  PULSE RANGE: @FLOWSTAT(8:24)@  BLOOD PRESSURE RANGE: @SBPMAX(24)@; @DBPMAX(24)@  I/O (24Hr):  No intake or output data in the 24 hours ending 24 1140  Objective:  General Appearance:  Comfortable and in no acute distress.    Vital signs: (most recent): Blood pressure 108/74, pulse (!) 102, weight 98.9 kg (218 lb 1.6 oz), last menstrual period 2024, unknown if currently  same.  Patient considering hysterectomy.  Patient has no future interest in fertility.    3 chronic pelvic pain with history of endometriosis and abnormal Paps.  Will await latest Pap results and will follow-up in office.  If Pap is abnormal then we will discussed with patient preoperatively to have hysterectomy performed.  Due to patient's age she should have conservation of least 1 ovary.  Patient has 3 prior  sections and likely large amount of adhesions and scarring secondary to endometriosis.  Patient will likely have to have a LEONEL with revision of abdominal scar.  Will discuss further once Pap results are available.).     Plan:   Regular diet.   (Follow-up in office with Pap results once available.  Will discuss patient treatment options going forward.).       Electronically signed by Alton Alcantara MD on 24 at 11:40 AM EDT

## 2024-08-29 LAB
CHLAMYDIA BY THIN PREP: NEGATIVE
N. GONORRHOEAE DNA, THIN PREP: NEGATIVE

## 2024-08-30 LAB — GYNECOLOGY CYTOLOGY REPORT: NORMAL

## 2024-09-03 ENCOUNTER — TELEPHONE (OUTPATIENT)
Age: 29
End: 2024-09-03

## 2024-09-10 ENCOUNTER — OFFICE VISIT (OUTPATIENT)
Age: 29
End: 2024-09-10
Payer: COMMERCIAL

## 2024-09-10 VITALS
HEART RATE: 86 BPM | DIASTOLIC BLOOD PRESSURE: 70 MMHG | OXYGEN SATURATION: 96 % | BODY MASS INDEX: 34.44 KG/M2 | WEIGHT: 219.4 LBS | SYSTOLIC BLOOD PRESSURE: 104 MMHG | HEIGHT: 67 IN

## 2024-09-10 DIAGNOSIS — L90.5 POSTOPERATIVE SCAR: ICD-10-CM

## 2024-09-10 DIAGNOSIS — G89.29 CHRONIC PELVIC PAIN IN FEMALE: Primary | ICD-10-CM

## 2024-09-10 DIAGNOSIS — N94.19 DYSPAREUNIA DUE TO MEDICAL CONDITION IN FEMALE: ICD-10-CM

## 2024-09-10 DIAGNOSIS — R10.2 CHRONIC PELVIC PAIN IN FEMALE: Primary | ICD-10-CM

## 2024-09-10 DIAGNOSIS — R87.612 LGSIL ON PAP SMEAR OF CERVIX: ICD-10-CM

## 2024-09-10 DIAGNOSIS — Z98.891 PREVIOUS CESAREAN SECTION: ICD-10-CM

## 2024-09-10 DIAGNOSIS — Z98.890 POSTOPERATIVE SCAR: ICD-10-CM

## 2024-09-10 DIAGNOSIS — N73.6 FEMALE PELVIC PERITONEAL ADHESIONS: ICD-10-CM

## 2024-09-10 PROCEDURE — 99214 OFFICE O/P EST MOD 30 MIN: CPT | Performed by: OBSTETRICS & GYNECOLOGY

## 2024-09-10 PROCEDURE — 99459 PELVIC EXAMINATION: CPT | Performed by: OBSTETRICS & GYNECOLOGY

## 2024-09-23 ENCOUNTER — TELEPHONE (OUTPATIENT)
Age: 29
End: 2024-09-23

## 2024-09-24 RX ORDER — SODIUM CHLORIDE, SODIUM LACTATE, POTASSIUM CHLORIDE, CALCIUM CHLORIDE 600; 310; 30; 20 MG/100ML; MG/100ML; MG/100ML; MG/100ML
INJECTION, SOLUTION INTRAVENOUS CONTINUOUS
OUTPATIENT
Start: 2024-10-01

## 2024-09-25 ENCOUNTER — HOSPITAL ENCOUNTER (OUTPATIENT)
Dept: PREADMISSION TESTING | Age: 29
Discharge: HOME | End: 2024-09-25
Payer: COMMERCIAL

## 2024-09-25 VITALS
RESPIRATION RATE: 16 BRPM | OXYGEN SATURATION: 97 % | DIASTOLIC BLOOD PRESSURE: 65 MMHG | TEMPERATURE: 97.7 F | BODY MASS INDEX: 34.21 KG/M2 | HEIGHT: 67 IN | SYSTOLIC BLOOD PRESSURE: 103 MMHG | HEART RATE: 62 BPM | WEIGHT: 218 LBS

## 2024-09-25 DIAGNOSIS — R87.612 PAPANICOLAOU SMEAR OF CERVIX WITH LOW GRADE SQUAMOUS INTRAEPITHELIAL LESION (LGSIL): ICD-10-CM

## 2024-09-25 DIAGNOSIS — G89.29 CHRONIC ABDOMINAL PAIN: ICD-10-CM

## 2024-09-25 DIAGNOSIS — R10.9 CHRONIC ABDOMINAL PAIN: ICD-10-CM

## 2024-09-25 DIAGNOSIS — R10.2 PELVIC PAIN SYNDROME: Primary | ICD-10-CM

## 2024-09-25 LAB
ABO + RH BLD: NORMAL
ARM BAND NUMBER: NORMAL
BASOPHILS # BLD: 0.02 K/UL (ref 0–0.2)
BASOPHILS NFR BLD: 0 % (ref 0–2)
BLOOD BANK SAMPLE EXPIRATION: NORMAL
BLOOD GROUP ANTIBODIES SERPL: NEGATIVE
EOSINOPHIL # BLD: 0.09 K/UL (ref 0.05–0.5)
EOSINOPHILS RELATIVE PERCENT: 2 % (ref 0–6)
ERYTHROCYTE [DISTWIDTH] IN BLOOD BY AUTOMATED COUNT: 15.8 % (ref 11.5–15)
HCT VFR BLD AUTO: 36.5 % (ref 34–48)
HGB BLD-MCNC: 11.5 G/DL (ref 11.5–15.5)
IMM GRANULOCYTES # BLD AUTO: <0.03 K/UL (ref 0–0.58)
IMM GRANULOCYTES NFR BLD: 0 % (ref 0–5)
LYMPHOCYTES NFR BLD: 1.23 K/UL (ref 1.5–4)
LYMPHOCYTES RELATIVE PERCENT: 21 % (ref 20–42)
MCH RBC QN AUTO: 26.9 PG (ref 26–35)
MCHC RBC AUTO-ENTMCNC: 31.5 G/DL (ref 32–34.5)
MCV RBC AUTO: 85.3 FL (ref 80–99.9)
MONOCYTES NFR BLD: 0.39 K/UL (ref 0.1–0.95)
MONOCYTES NFR BLD: 7 % (ref 2–12)
NEUTROPHILS NFR BLD: 70 % (ref 43–80)
NEUTS SEG NFR BLD: 4.02 K/UL (ref 1.8–7.3)
PLATELET # BLD AUTO: 258 K/UL (ref 130–450)
PMV BLD AUTO: 11 FL (ref 7–12)
RBC # BLD AUTO: 4.28 M/UL (ref 3.5–5.5)
WBC OTHER # BLD: 5.8 K/UL (ref 4.5–11.5)

## 2024-09-25 PROCEDURE — 86850 RBC ANTIBODY SCREEN: CPT

## 2024-09-25 PROCEDURE — 85025 COMPLETE CBC W/AUTO DIFF WBC: CPT

## 2024-09-25 PROCEDURE — 86900 BLOOD TYPING SEROLOGIC ABO: CPT

## 2024-09-25 PROCEDURE — 86901 BLOOD TYPING SEROLOGIC RH(D): CPT

## 2024-09-30 ENCOUNTER — ANESTHESIA EVENT (OUTPATIENT)
Dept: OPERATING ROOM | Age: 29
End: 2024-09-30
Payer: COMMERCIAL

## 2024-09-30 ENCOUNTER — PREP FOR PROCEDURE (OUTPATIENT)
Age: 29
End: 2024-09-30

## 2024-09-30 RX ORDER — SODIUM CHLORIDE, SODIUM LACTATE, POTASSIUM CHLORIDE, CALCIUM CHLORIDE 600; 310; 30; 20 MG/100ML; MG/100ML; MG/100ML; MG/100ML
INJECTION, SOLUTION INTRAVENOUS CONTINUOUS
Status: CANCELLED | OUTPATIENT
Start: 2024-09-30

## 2024-09-30 RX ORDER — SODIUM CHLORIDE 0.9 % (FLUSH) 0.9 %
5-40 SYRINGE (ML) INJECTION PRN
Status: CANCELLED | OUTPATIENT
Start: 2024-09-30

## 2024-09-30 RX ORDER — SODIUM CHLORIDE 9 MG/ML
INJECTION, SOLUTION INTRAVENOUS PRN
Status: CANCELLED | OUTPATIENT
Start: 2024-09-30

## 2024-09-30 RX ORDER — SODIUM CHLORIDE 0.9 % (FLUSH) 0.9 %
5-40 SYRINGE (ML) INJECTION EVERY 12 HOURS SCHEDULED
Status: CANCELLED | OUTPATIENT
Start: 2024-09-30

## 2024-09-30 NOTE — H&P (VIEW-ONLY)
regular rate and rhythm, S1, S2 normal, no murmur, click, rub or gallop  Abdomen: soft, non-tender; bowel sounds normal; no masses,  no organomegaly  Pelvic: cervix normal in appearance, external genitalia normal, no adnexal masses or tenderness, rectovaginal septum normal, vagina normal without discharge, uterus noted to be tender on exam semimobile and somewhat fixed to the anterior abdominal wall consistent with prior adhesions and scarring from  sections.  Suspect on exam with patient having uterine tenderness and may have element of adenomyosis.  No adnexal masses appreciated.  Extremities: extremities normal, atraumatic, no cyanosis or edema  Pulses: 2+ and symmetric  Skin: Skin color, texture, turgor normal. No rashes or lesions  Lymph nodes: Cervical, supraclavicular, and axillary nodes normal.  Neurologic: Grossly normal      Assessment:     Operative exam consistent with presentation of adenomyosis pelvic pain along with adhesions and scarring from prior  section.  Consented for procedure all potential risk complications of the procedure.     Plan:    Follow up: Follow-up in OR for procedure.  Consents reviewed and preoperative orders placed. Proceed with scheduling.      Dontae Alcantara MD  2024

## 2024-09-30 NOTE — H&P
Subjective:       Мария Peters is a 29 y.o. female here for surgical procedure today.  Patient has prior complaints of dysmenorrhea with chronic pelvic pain with noted adhesions on prior exam due to multiple  sections.  Patient notes last Pap smear was noted be LGSIL consistent with cervical dysplasia.  Patient was subsequent consented for total abdominal hysterectomy left salpingectomy for ectomy with lysis of adhesions and scar revision.  Tensive preop counseling was given to patient she knows all potential risk and complications of the procedure including loss of fertility.  Preoperative orders have been placed and patient has been cleared for procedure..  Current Complaints: As above.  Personal Health Questionnaire Reviewed: yes.     Gynecologic History  Patient's last menstrual period was 2024 (approximate).  Contraception: tubal ligation  Last Pap: 1 month ago results: LGSIL on Pap.    OB History          4    Para   3    Term   3       0    AB   1    Living   3         SAB   1    IAB        Ectopic        Molar        Multiple   1    Live Births   3              Past Medical History:   Diagnosis Date    Anxiety     Depression     ADHD    Endometriosis of pelvis 2018    Gestational diabetes     Miscarriage     Prolonged emergence from general anesthesia     slow to wake up after c section    Wound infection 10/07/2020     Patient Active Problem List    Diagnosis Date Noted    Liveborn infant by  delivery 2023    Abdominal pain 2023    Abdominal pain affecting pregnancy 2023    33 weeks gestation of pregnancy 2023    Complicated pregnancy, third trimester 2023    NST (non-stress test) reactive on fetal surveillance 2023    Non-reactive NST (non-stress test) 2023    NST (non-stress test) reactive 2023    Pelvic pain in female 2022    Wound infection 10/07/2020    Liveborn by  2020    Complication

## 2024-10-01 ENCOUNTER — ANESTHESIA (OUTPATIENT)
Dept: OPERATING ROOM | Age: 29
End: 2024-10-01
Payer: COMMERCIAL

## 2024-10-01 ENCOUNTER — HOSPITAL ENCOUNTER (INPATIENT)
Age: 29
LOS: 2 days | Discharge: HOME | End: 2024-10-03
Attending: OBSTETRICS & GYNECOLOGY | Admitting: OBSTETRICS & GYNECOLOGY
Payer: COMMERCIAL

## 2024-10-01 DIAGNOSIS — R10.9 CHRONIC ABDOMINAL PAIN: ICD-10-CM

## 2024-10-01 DIAGNOSIS — R10.2 PELVIC PAIN SYNDROME: ICD-10-CM

## 2024-10-01 DIAGNOSIS — R10.2 PELVIC PAIN: Primary | ICD-10-CM

## 2024-10-01 DIAGNOSIS — G89.29 CHRONIC ABDOMINAL PAIN: ICD-10-CM

## 2024-10-01 DIAGNOSIS — R10.84 GENERALIZED ABDOMINAL PAIN: ICD-10-CM

## 2024-10-01 DIAGNOSIS — R87.612 PAPANICOLAOU SMEAR OF CERVIX WITH LOW GRADE SQUAMOUS INTRAEPITHELIAL LESION (LGSIL): ICD-10-CM

## 2024-10-01 LAB
ERYTHROCYTE [DISTWIDTH] IN BLOOD BY AUTOMATED COUNT: 16 % (ref 11.5–15)
HCG, URINE, POC: NEGATIVE
HCT VFR BLD AUTO: 36.7 % (ref 34–48)
HGB BLD-MCNC: 11.4 G/DL (ref 11.5–15.5)
Lab: NORMAL
MCH RBC QN AUTO: 27.1 PG (ref 26–35)
MCHC RBC AUTO-ENTMCNC: 31.1 G/DL (ref 32–34.5)
MCV RBC AUTO: 87.4 FL (ref 80–99.9)
NEGATIVE QC PASS/FAIL: NORMAL
PLATELET # BLD AUTO: 229 K/UL (ref 130–450)
PMV BLD AUTO: 10.5 FL (ref 7–12)
POSITIVE QC PASS/FAIL: NORMAL
RBC # BLD AUTO: 4.2 M/UL (ref 3.5–5.5)
WBC OTHER # BLD: 12.4 K/UL (ref 4.5–11.5)

## 2024-10-01 PROCEDURE — 7100000001 HC PACU RECOVERY - ADDTL 15 MIN: Performed by: OBSTETRICS & GYNECOLOGY

## 2024-10-01 PROCEDURE — 58150 TOTAL HYSTERECTOMY: CPT | Performed by: OBSTETRICS & GYNECOLOGY

## 2024-10-01 PROCEDURE — 6360000002 HC RX W HCPCS: Performed by: NURSE ANESTHETIST, CERTIFIED REGISTERED

## 2024-10-01 PROCEDURE — 3700000001 HC ADD 15 MINUTES (ANESTHESIA): Performed by: OBSTETRICS & GYNECOLOGY

## 2024-10-01 PROCEDURE — 2580000003 HC RX 258: Performed by: OBSTETRICS & GYNECOLOGY

## 2024-10-01 PROCEDURE — 6360000002 HC RX W HCPCS: Performed by: OBSTETRICS & GYNECOLOGY

## 2024-10-01 PROCEDURE — 2709999900 HC NON-CHARGEABLE SUPPLY: Performed by: OBSTETRICS & GYNECOLOGY

## 2024-10-01 PROCEDURE — 1200000000 HC SEMI PRIVATE

## 2024-10-01 PROCEDURE — 88305 TISSUE EXAM BY PATHOLOGIST: CPT

## 2024-10-01 PROCEDURE — 88307 TISSUE EXAM BY PATHOLOGIST: CPT

## 2024-10-01 PROCEDURE — 0UT10ZZ RESECTION OF LEFT OVARY, OPEN APPROACH: ICD-10-PCS | Performed by: OBSTETRICS & GYNECOLOGY

## 2024-10-01 PROCEDURE — 7100000000 HC PACU RECOVERY - FIRST 15 MIN: Performed by: OBSTETRICS & GYNECOLOGY

## 2024-10-01 PROCEDURE — 3600000004 HC SURGERY LEVEL 4 BASE: Performed by: OBSTETRICS & GYNECOLOGY

## 2024-10-01 PROCEDURE — 0UT90ZZ RESECTION OF UTERUS, OPEN APPROACH: ICD-10-PCS | Performed by: OBSTETRICS & GYNECOLOGY

## 2024-10-01 PROCEDURE — 3600000014 HC SURGERY LEVEL 4 ADDTL 15MIN: Performed by: OBSTETRICS & GYNECOLOGY

## 2024-10-01 PROCEDURE — 85027 COMPLETE CBC AUTOMATED: CPT

## 2024-10-01 PROCEDURE — C1765 ADHESION BARRIER: HCPCS | Performed by: OBSTETRICS & GYNECOLOGY

## 2024-10-01 PROCEDURE — 2500000003 HC RX 250 WO HCPCS: Performed by: NURSE ANESTHETIST, CERTIFIED REGISTERED

## 2024-10-01 PROCEDURE — 3700000000 HC ANESTHESIA ATTENDED CARE: Performed by: OBSTETRICS & GYNECOLOGY

## 2024-10-01 PROCEDURE — 0UT60ZZ RESECTION OF LEFT FALLOPIAN TUBE, OPEN APPROACH: ICD-10-PCS | Performed by: OBSTETRICS & GYNECOLOGY

## 2024-10-01 PROCEDURE — 87086 URINE CULTURE/COLONY COUNT: CPT

## 2024-10-01 PROCEDURE — 2720000010 HC SURG SUPPLY STERILE: Performed by: OBSTETRICS & GYNECOLOGY

## 2024-10-01 PROCEDURE — 6360000002 HC RX W HCPCS: Performed by: ANESTHESIOLOGY

## 2024-10-01 RX ORDER — BISACODYL 10 MG
10 SUPPOSITORY, RECTAL RECTAL DAILY PRN
Status: DISCONTINUED | OUTPATIENT
Start: 2024-10-01 | End: 2024-10-03 | Stop reason: HOSPADM

## 2024-10-01 RX ORDER — NEOSTIGMINE METHYLSULFATE 1 MG/ML
INJECTION, SOLUTION INTRAVENOUS
Status: DISCONTINUED | OUTPATIENT
Start: 2024-10-01 | End: 2024-10-01 | Stop reason: SDUPTHER

## 2024-10-01 RX ORDER — SODIUM CHLORIDE, SODIUM LACTATE, POTASSIUM CHLORIDE, CALCIUM CHLORIDE 600; 310; 30; 20 MG/100ML; MG/100ML; MG/100ML; MG/100ML
125 INJECTION, SOLUTION INTRAVENOUS CONTINUOUS
Status: DISCONTINUED | OUTPATIENT
Start: 2024-10-01 | End: 2024-10-03 | Stop reason: HOSPADM

## 2024-10-01 RX ORDER — ONDANSETRON 2 MG/ML
INJECTION INTRAMUSCULAR; INTRAVENOUS
Status: DISCONTINUED | OUTPATIENT
Start: 2024-10-01 | End: 2024-10-01 | Stop reason: SDUPTHER

## 2024-10-01 RX ORDER — SODIUM CHLORIDE, SODIUM LACTATE, POTASSIUM CHLORIDE, CALCIUM CHLORIDE 600; 310; 30; 20 MG/100ML; MG/100ML; MG/100ML; MG/100ML
INJECTION, SOLUTION INTRAVENOUS CONTINUOUS
Status: DISCONTINUED | OUTPATIENT
Start: 2024-10-01 | End: 2024-10-01 | Stop reason: HOSPADM

## 2024-10-01 RX ORDER — PROCHLORPERAZINE EDISYLATE 5 MG/ML
5 INJECTION INTRAMUSCULAR; INTRAVENOUS
Status: DISCONTINUED | OUTPATIENT
Start: 2024-10-01 | End: 2024-10-01 | Stop reason: HOSPADM

## 2024-10-01 RX ORDER — SODIUM CHLORIDE 9 MG/ML
INJECTION, SOLUTION INTRAVENOUS PRN
Status: DISCONTINUED | OUTPATIENT
Start: 2024-10-01 | End: 2024-10-01 | Stop reason: HOSPADM

## 2024-10-01 RX ORDER — FENTANYL CITRATE 50 UG/ML
INJECTION, SOLUTION INTRAMUSCULAR; INTRAVENOUS
Status: DISCONTINUED | OUTPATIENT
Start: 2024-10-01 | End: 2024-10-01 | Stop reason: SDUPTHER

## 2024-10-01 RX ORDER — SODIUM CHLORIDE 0.9 % (FLUSH) 0.9 %
5-40 SYRINGE (ML) INJECTION PRN
Status: DISCONTINUED | OUTPATIENT
Start: 2024-10-01 | End: 2024-10-03 | Stop reason: HOSPADM

## 2024-10-01 RX ORDER — DOCUSATE SODIUM 100 MG/1
100 CAPSULE, LIQUID FILLED ORAL 2 TIMES DAILY
Status: DISCONTINUED | OUTPATIENT
Start: 2024-10-01 | End: 2024-10-02

## 2024-10-01 RX ORDER — IBUPROFEN 800 MG/1
800 TABLET, FILM COATED ORAL EVERY 8 HOURS
Status: DISCONTINUED | OUTPATIENT
Start: 2024-10-02 | End: 2024-10-02

## 2024-10-01 RX ORDER — SODIUM CHLORIDE 0.9 % (FLUSH) 0.9 %
5-40 SYRINGE (ML) INJECTION EVERY 12 HOURS SCHEDULED
Status: DISCONTINUED | OUTPATIENT
Start: 2024-10-01 | End: 2024-10-03 | Stop reason: HOSPADM

## 2024-10-01 RX ORDER — METRONIDAZOLE 500 MG/100ML
500 INJECTION, SOLUTION INTRAVENOUS ONCE
Status: COMPLETED | OUTPATIENT
Start: 2024-10-01 | End: 2024-10-01

## 2024-10-01 RX ORDER — DEXAMETHASONE SODIUM PHOSPHATE 10 MG/ML
INJECTION, SOLUTION INTRAMUSCULAR; INTRAVENOUS
Status: DISCONTINUED | OUTPATIENT
Start: 2024-10-01 | End: 2024-10-01 | Stop reason: SDUPTHER

## 2024-10-01 RX ORDER — BISACODYL 5 MG/1
5 TABLET, DELAYED RELEASE ORAL DAILY
Status: DISCONTINUED | OUTPATIENT
Start: 2024-10-01 | End: 2024-10-03 | Stop reason: HOSPADM

## 2024-10-01 RX ORDER — GLYCOPYRROLATE 0.2 MG/ML
INJECTION INTRAMUSCULAR; INTRAVENOUS
Status: DISCONTINUED | OUTPATIENT
Start: 2024-10-01 | End: 2024-10-01 | Stop reason: SDUPTHER

## 2024-10-01 RX ORDER — KETOROLAC TROMETHAMINE 30 MG/ML
30 INJECTION, SOLUTION INTRAMUSCULAR; INTRAVENOUS EVERY 6 HOURS
Status: COMPLETED | OUTPATIENT
Start: 2024-10-01 | End: 2024-10-02

## 2024-10-01 RX ORDER — ONDANSETRON 2 MG/ML
4 INJECTION INTRAMUSCULAR; INTRAVENOUS EVERY 6 HOURS PRN
Status: DISCONTINUED | OUTPATIENT
Start: 2024-10-01 | End: 2024-10-03 | Stop reason: HOSPADM

## 2024-10-01 RX ORDER — CEFAZOLIN SODIUM 1 G/3ML
INJECTION, POWDER, FOR SOLUTION INTRAMUSCULAR; INTRAVENOUS
Status: DISCONTINUED | OUTPATIENT
Start: 2024-10-01 | End: 2024-10-01 | Stop reason: SDUPTHER

## 2024-10-01 RX ORDER — NALOXONE HYDROCHLORIDE 0.4 MG/ML
INJECTION, SOLUTION INTRAMUSCULAR; INTRAVENOUS; SUBCUTANEOUS PRN
Status: DISCONTINUED | OUTPATIENT
Start: 2024-10-01 | End: 2024-10-01 | Stop reason: HOSPADM

## 2024-10-01 RX ORDER — OXYCODONE HYDROCHLORIDE 5 MG/1
5 TABLET ORAL EVERY 4 HOURS PRN
Status: DISCONTINUED | OUTPATIENT
Start: 2024-10-02 | End: 2024-10-03 | Stop reason: HOSPADM

## 2024-10-01 RX ORDER — MIDAZOLAM HYDROCHLORIDE 1 MG/ML
INJECTION INTRAMUSCULAR; INTRAVENOUS
Status: DISCONTINUED | OUTPATIENT
Start: 2024-10-01 | End: 2024-10-01 | Stop reason: SDUPTHER

## 2024-10-01 RX ORDER — MEPERIDINE HYDROCHLORIDE 25 MG/ML
12.5 INJECTION INTRAMUSCULAR; INTRAVENOUS; SUBCUTANEOUS EVERY 5 MIN PRN
Status: COMPLETED | OUTPATIENT
Start: 2024-10-01 | End: 2024-10-01

## 2024-10-01 RX ORDER — KETOROLAC TROMETHAMINE 30 MG/ML
30 INJECTION, SOLUTION INTRAMUSCULAR; INTRAVENOUS
Status: COMPLETED | OUTPATIENT
Start: 2024-10-01 | End: 2024-10-01

## 2024-10-01 RX ORDER — SODIUM CHLORIDE 0.9 % (FLUSH) 0.9 %
5-40 SYRINGE (ML) INJECTION EVERY 12 HOURS SCHEDULED
Status: DISCONTINUED | OUTPATIENT
Start: 2024-10-01 | End: 2024-10-01 | Stop reason: HOSPADM

## 2024-10-01 RX ORDER — SENNOSIDES 8.8 MG/5ML
5 LIQUID ORAL 2 TIMES DAILY PRN
Status: DISCONTINUED | OUTPATIENT
Start: 2024-10-01 | End: 2024-10-03 | Stop reason: HOSPADM

## 2024-10-01 RX ORDER — DIPHENHYDRAMINE HYDROCHLORIDE 50 MG/ML
12.5 INJECTION INTRAMUSCULAR; INTRAVENOUS
Status: DISCONTINUED | OUTPATIENT
Start: 2024-10-01 | End: 2024-10-01 | Stop reason: HOSPADM

## 2024-10-01 RX ORDER — PROPOFOL 10 MG/ML
INJECTION, EMULSION INTRAVENOUS
Status: DISCONTINUED | OUTPATIENT
Start: 2024-10-01 | End: 2024-10-01 | Stop reason: SDUPTHER

## 2024-10-01 RX ORDER — SODIUM CHLORIDE 0.9 % (FLUSH) 0.9 %
5-40 SYRINGE (ML) INJECTION PRN
Status: DISCONTINUED | OUTPATIENT
Start: 2024-10-01 | End: 2024-10-01 | Stop reason: HOSPADM

## 2024-10-01 RX ORDER — HYDRALAZINE HYDROCHLORIDE 20 MG/ML
10 INJECTION INTRAMUSCULAR; INTRAVENOUS
Status: DISCONTINUED | OUTPATIENT
Start: 2024-10-01 | End: 2024-10-01 | Stop reason: HOSPADM

## 2024-10-01 RX ORDER — ENOXAPARIN SODIUM 100 MG/ML
40 INJECTION SUBCUTANEOUS DAILY
Status: DISCONTINUED | OUTPATIENT
Start: 2024-10-02 | End: 2024-10-03 | Stop reason: HOSPADM

## 2024-10-01 RX ORDER — ONDANSETRON 2 MG/ML
4 INJECTION INTRAMUSCULAR; INTRAVENOUS
Status: DISCONTINUED | OUTPATIENT
Start: 2024-10-01 | End: 2024-10-01 | Stop reason: HOSPADM

## 2024-10-01 RX ORDER — MORPHINE SULFATE 2 MG/ML
2 INJECTION, SOLUTION INTRAMUSCULAR; INTRAVENOUS EVERY 5 MIN PRN
Status: DISCONTINUED | OUTPATIENT
Start: 2024-10-01 | End: 2024-10-01 | Stop reason: HOSPADM

## 2024-10-01 RX ORDER — DIPHENHYDRAMINE HYDROCHLORIDE 50 MG/ML
INJECTION INTRAMUSCULAR; INTRAVENOUS
Status: DISCONTINUED | OUTPATIENT
Start: 2024-10-01 | End: 2024-10-01 | Stop reason: SDUPTHER

## 2024-10-01 RX ORDER — ROCURONIUM BROMIDE 10 MG/ML
INJECTION, SOLUTION INTRAVENOUS
Status: DISCONTINUED | OUTPATIENT
Start: 2024-10-01 | End: 2024-10-01 | Stop reason: SDUPTHER

## 2024-10-01 RX ORDER — LABETALOL HYDROCHLORIDE 5 MG/ML
10 INJECTION, SOLUTION INTRAVENOUS
Status: DISCONTINUED | OUTPATIENT
Start: 2024-10-01 | End: 2024-10-01 | Stop reason: HOSPADM

## 2024-10-01 RX ORDER — METHOCARBAMOL 100 MG/ML
1000 INJECTION, SOLUTION INTRAMUSCULAR; INTRAVENOUS
Status: COMPLETED | OUTPATIENT
Start: 2024-10-01 | End: 2024-10-01

## 2024-10-01 RX ORDER — LIDOCAINE HYDROCHLORIDE 20 MG/ML
INJECTION, SOLUTION INTRAVENOUS
Status: DISCONTINUED | OUTPATIENT
Start: 2024-10-01 | End: 2024-10-01 | Stop reason: SDUPTHER

## 2024-10-01 RX ORDER — MIDAZOLAM HYDROCHLORIDE 1 MG/ML
2 INJECTION, SOLUTION INTRAMUSCULAR; INTRAVENOUS
Status: DISCONTINUED | OUTPATIENT
Start: 2024-10-01 | End: 2024-10-01 | Stop reason: HOSPADM

## 2024-10-01 RX ORDER — SODIUM CHLORIDE 9 MG/ML
INJECTION, SOLUTION INTRAVENOUS PRN
Status: DISCONTINUED | OUTPATIENT
Start: 2024-10-01 | End: 2024-10-03 | Stop reason: HOSPADM

## 2024-10-01 RX ORDER — IPRATROPIUM BROMIDE AND ALBUTEROL SULFATE 2.5; .5 MG/3ML; MG/3ML
1 SOLUTION RESPIRATORY (INHALATION)
Status: DISCONTINUED | OUTPATIENT
Start: 2024-10-01 | End: 2024-10-01 | Stop reason: HOSPADM

## 2024-10-01 RX ADMIN — PROPOFOL 170 MG: 10 INJECTION, EMULSION INTRAVENOUS at 13:33

## 2024-10-01 RX ADMIN — KETOROLAC TROMETHAMINE 30 MG: 30 INJECTION, SOLUTION INTRAMUSCULAR at 18:32

## 2024-10-01 RX ADMIN — SODIUM CHLORIDE, POTASSIUM CHLORIDE, SODIUM LACTATE AND CALCIUM CHLORIDE: 600; 310; 30; 20 INJECTION, SOLUTION INTRAVENOUS at 12:33

## 2024-10-01 RX ADMIN — ROCURONIUM BROMIDE 10 MG: 100 INJECTION INTRAVENOUS at 14:27

## 2024-10-01 RX ADMIN — MEPERIDINE HYDROCHLORIDE 12.5 MG: 25 INJECTION INTRAMUSCULAR; INTRAVENOUS; SUBCUTANEOUS at 16:17

## 2024-10-01 RX ADMIN — FENTANYL CITRATE 100 MCG: 50 INJECTION, SOLUTION INTRAMUSCULAR; INTRAVENOUS at 13:33

## 2024-10-01 RX ADMIN — FENTANYL CITRATE 50 MCG: 50 INJECTION, SOLUTION INTRAMUSCULAR; INTRAVENOUS at 14:07

## 2024-10-01 RX ADMIN — MIDAZOLAM 2 MG: 1 INJECTION INTRAMUSCULAR; INTRAVENOUS at 13:27

## 2024-10-01 RX ADMIN — FENTANYL CITRATE 50 MCG: 50 INJECTION, SOLUTION INTRAMUSCULAR; INTRAVENOUS at 15:38

## 2024-10-01 RX ADMIN — KETOROLAC TROMETHAMINE 30 MG: 30 INJECTION, SOLUTION INTRAMUSCULAR at 23:43

## 2024-10-01 RX ADMIN — METHOCARBAMOL 1000 MG: 100 INJECTION INTRAMUSCULAR; INTRAVENOUS at 15:47

## 2024-10-01 RX ADMIN — FENTANYL CITRATE 50 MCG: 50 INJECTION, SOLUTION INTRAMUSCULAR; INTRAVENOUS at 14:36

## 2024-10-01 RX ADMIN — FENTANYL CITRATE 50 MCG: 50 INJECTION, SOLUTION INTRAMUSCULAR; INTRAVENOUS at 15:00

## 2024-10-01 RX ADMIN — KETOROLAC TROMETHAMINE 30 MG: 30 INJECTION, SOLUTION INTRAMUSCULAR at 15:43

## 2024-10-01 RX ADMIN — SODIUM CHLORIDE, POTASSIUM CHLORIDE, SODIUM LACTATE AND CALCIUM CHLORIDE: 600; 310; 30; 20 INJECTION, SOLUTION INTRAVENOUS at 14:01

## 2024-10-01 RX ADMIN — HYDROMORPHONE HYDROCHLORIDE 0.5 MG: 1 INJECTION, SOLUTION INTRAMUSCULAR; INTRAVENOUS; SUBCUTANEOUS at 16:08

## 2024-10-01 RX ADMIN — MORPHINE SULFATE 2 MG: 2 INJECTION, SOLUTION INTRAMUSCULAR; INTRAVENOUS at 16:24

## 2024-10-01 RX ADMIN — WATER 1000 MG: 1 INJECTION INTRAMUSCULAR; INTRAVENOUS; SUBCUTANEOUS at 18:32

## 2024-10-01 RX ADMIN — FENTANYL CITRATE 50 MCG: 50 INJECTION, SOLUTION INTRAMUSCULAR; INTRAVENOUS at 13:56

## 2024-10-01 RX ADMIN — DIPHENHYDRAMINE HYDROCHLORIDE 12.5 MG: 50 INJECTION, SOLUTION INTRAMUSCULAR; INTRAVENOUS at 13:27

## 2024-10-01 RX ADMIN — HYDROMORPHONE HYDROCHLORIDE 0.5 MG: 1 INJECTION, SOLUTION INTRAMUSCULAR; INTRAVENOUS; SUBCUTANEOUS at 15:44

## 2024-10-01 RX ADMIN — DEXAMETHASONE SODIUM PHOSPHATE 10 MG: 10 INJECTION INTRAMUSCULAR; INTRAVENOUS at 13:40

## 2024-10-01 RX ADMIN — ONDANSETRON 4 MG: 2 INJECTION, SOLUTION INTRAMUSCULAR; INTRAVENOUS at 15:16

## 2024-10-01 RX ADMIN — ROCURONIUM BROMIDE 40 MG: 100 INJECTION INTRAVENOUS at 13:33

## 2024-10-01 RX ADMIN — CEFAZOLIN 2000 MG: 2 INJECTION, POWDER, FOR SOLUTION INTRAMUSCULAR; INTRAVENOUS at 13:45

## 2024-10-01 RX ADMIN — HYDROMORPHONE HYDROCHLORIDE 0.5 MG: 1 INJECTION, SOLUTION INTRAMUSCULAR; INTRAVENOUS; SUBCUTANEOUS at 17:36

## 2024-10-01 RX ADMIN — Medication 3 MG: at 15:19

## 2024-10-01 RX ADMIN — GLYCOPYRROLATE 0.6 MG: 0.2 INJECTION INTRAMUSCULAR; INTRAVENOUS at 15:19

## 2024-10-01 RX ADMIN — CEFAZOLIN 1 G: 1 INJECTION, POWDER, FOR SOLUTION INTRAMUSCULAR; INTRAVENOUS at 14:58

## 2024-10-01 RX ADMIN — ROCURONIUM BROMIDE 10 MG: 100 INJECTION INTRAVENOUS at 15:00

## 2024-10-01 RX ADMIN — MEPERIDINE HYDROCHLORIDE 12.5 MG: 25 INJECTION INTRAMUSCULAR; INTRAVENOUS; SUBCUTANEOUS at 15:46

## 2024-10-01 RX ADMIN — WATER 1000 MG: 1 INJECTION INTRAMUSCULAR; INTRAVENOUS; SUBCUTANEOUS at 23:43

## 2024-10-01 RX ADMIN — METRONIDAZOLE 500 MG: 500 SOLUTION INTRAVENOUS at 13:29

## 2024-10-01 RX ADMIN — LIDOCAINE HYDROCHLORIDE 100 MG: 20 INJECTION, SOLUTION INTRAVENOUS at 13:33

## 2024-10-01 ASSESSMENT — PAIN DESCRIPTION - ORIENTATION
ORIENTATION: LOWER
ORIENTATION: MID

## 2024-10-01 ASSESSMENT — PAIN SCALES - GENERAL
PAINLEVEL_OUTOF10: 0
PAINLEVEL_OUTOF10: 4
PAINLEVEL_OUTOF10: 10
PAINLEVEL_OUTOF10: 4
PAINLEVEL_OUTOF10: 9
PAINLEVEL_OUTOF10: 10
PAINLEVEL_OUTOF10: 4
PAINLEVEL_OUTOF10: 7
PAINLEVEL_OUTOF10: 3

## 2024-10-01 ASSESSMENT — PAIN DESCRIPTION - DESCRIPTORS
DESCRIPTORS: DISCOMFORT
DESCRIPTORS: ACHING;NAGGING;DISCOMFORT
DESCRIPTORS: DISCOMFORT;SORE;TENDER
DESCRIPTORS: ACHING;CRUSHING

## 2024-10-01 ASSESSMENT — PAIN DESCRIPTION - LOCATION
LOCATION: ABDOMEN

## 2024-10-01 ASSESSMENT — PAIN DESCRIPTION - PAIN TYPE: TYPE: ACUTE PAIN;SURGICAL PAIN

## 2024-10-01 ASSESSMENT — PAIN - FUNCTIONAL ASSESSMENT: PAIN_FUNCTIONAL_ASSESSMENT: NONE - DENIES PAIN

## 2024-10-01 ASSESSMENT — PAIN DESCRIPTION - ONSET: ONSET: AWAKENED FROM SLEEP

## 2024-10-01 NOTE — ANESTHESIA POSTPROCEDURE EVALUATION
Department of Anesthesiology  Postprocedure Note    Patient: Мария Peters  MRN: [id number]  YOB: 1995  Date of evaluation: 10/1/2024    Procedure Summary       Date: 10/01/24 Room / Location: [address]    Anesthesia Start: 1325 Anesthesia Stop: 1541    Procedure: TOTAL ABDOMINAL HYSTERECTOMY WITH BILATERAL SALPINGECTOMY AND LEFT OOPHORECTOMY WITH SCAR REVISION AND LYSIS OF ADHESIONS (CPT 82384, 77878) (Bilateral: Abdomen) Diagnosis:       Pelvic pain syndrome      Chronic abdominal pain      Papanicolaou smear of cervix with low grade squamous intraepithelial lesion (LGSIL)      (Pelvic pain syndrome [R10.2])      (Chronic abdominal pain [R10.9, G89.29])      (Papanicolaou smear of cervix with low grade squamous intraepithelial lesion (LGSIL) [R87.612])    Surgeons: Dontae Alcantara MD Responsible Provider: Isaias Ellis MD    Anesthesia Type: general ASA Status: 2            Anesthesia Type: No value filed.    Zenobia Phase I: Zenobia Score: 8    Zenobia Phase II:      Anesthesia Post Evaluation    Patient location during evaluation: PACU  Patient participation: complete - patient participated  Level of consciousness: awake  Airway patency: patent  Nausea & Vomiting: no nausea and no vomiting  Cardiovascular status: hemodynamically stable  Respiratory status: acceptable  Hydration status: euvolemic  Pain management: adequate        No notable events documented.

## 2024-10-01 NOTE — INTERVAL H&P NOTE
Update History & Physical    The patient's History and Physical of September 30, 2024 was reviewed with the patient and I examined the patient. There was no change. The surgical site was confirmed by the patient and me.     Plan: The risks, benefits, expected outcome, and alternative to the recommended procedure have been discussed with the patient. Patient understands and wants to proceed with the procedure.     Electronically signed by Dontae Alcantara MD on 10/1/2024 at 12:37 PM

## 2024-10-01 NOTE — PROCEDURES
PROCEDURE NOTE  Date: 10/1/2024   Name: Мария Peters  YOB: 1995    Department of Gynecology  Operative Report        Pre-operative Diagnosis: Pelvic pain dysmenorrhea with dyspareunia.  History of 3 prior  sections.  Likely intra-abdominal adhesions and scarring.    Post-operative Diagnosis: Same.  Extensive adhesions to bladder to anterior abdominal wall and lower uterine fundus.    Procedure: Total abdominal hysterectomy with left salpingo-oophorectomy.  Since of lysis of adhesions.  Scar revision.    Surgeon:  Dontae Alcantara MD FACOG     Assistant(s):  First Assist     Anesthesia:  General Endotracheal Anesthesia    Findings: Large amounts of postoperative scarring with dense adhesions to the anterior abdominal wall with bladder adherent to the anterior carlos wall and lower uterine segment.  Large amount of scarring from prior  section Pfannenstiel incision x 3.    Total IV fluids: 1700 cc    Urine Output: 250 cc clear noted under procedure and intraoperatively    Estimated blood loss:  250ml    Blood Transfusion?:  No     Drains:  none    Specimens: Uterus cervix and left adnexa consisting of partial tube and left ovary pathology.  Partial right adnexa with tubal remnant to pathology.  Excised fascial scar to pathology    Complications:  none    Condition: Stable postoperatively and sent to PACU for postoperative care    Patient met in preoperative area and consents were reviewed in detail.  Consents were reviewed reassigned and after patient knew all potential risk and complication procedure and states she wished to proceed she was then sent to the OR.  Patient was placed in dorsolithotomy position and then placed under general anesthesia.  Patient was then prepped in the normal sterile fashion using aseptic technique Santoyo catheter was placed noted to be draining clear urine.  Vaginal prep with Betadine was performed.  Following this the patient was draped and attention  was placed to the abdomen.  Lower abdomen abdomen noted that patient had 3 prior  sections with a dense amount of scarring and subluxation of the skin.  Scars were easily delineated and incised superiorly inferiorly and then laterally to excise all prior incisional scars from the overlying skin.  The skin scar with underlying scarring into the subcuticular space was then easily dissected and removed using Bovie electrocautery with good hemostasis.  Scar was then sent to pathology for evaluation.  Subcuticular fat was then retracted and incised and carried to the underlying fascia with Bovie electrocautery noted a large amount of scarring from prior  sections.  Fascia was then nicked in the midline and using a combination of cautery and sharp excisional extended superiorly and laterally with good hemostasis.  Was noted to be a large amount of rectus muscle diastases, retracting the rectus muscles laterally the abdomen was then entered sharply.  Noted the bowel bowel contents were free and clear of the anterior abdominal wall.  O'Neo-O'Jones tractor was then easily placed into the abdomen retracting the rectus muscle and underlying peritoneum.  Small bowel was then packed into the upper abdomen using multiple open wet packs with 2 rolled packs into the bilateral cul-de-sac and gutters.  Uterus was then easily visualized and noted to be densely adherent with the bladder to the anterior abdominal wall to the lower one half of the uterine fundus.  The uterus was grabbed in the bilateral cornua long Vandana clamps and elevated somewhat limited by adhesions into the midline and superiorly.  The round ligaments were then easily identified incised and cauterized using MangoPlate LigaSure device.  An extensive anterior dissection of the bladder using electrocautery and sharp dissection the bladder was then freed from the anterior uterine fundus from the mid position and inferiorly.  Any dissection down to

## 2024-10-01 NOTE — ANESTHESIA PRE PROCEDURE
TONSILLECTOMY         Social History:    Social History     Tobacco Use    Smoking status: Never    Smokeless tobacco: Never   Substance Use Topics    Alcohol use: No     Comment: occ                                Counseling given: Not Answered      Vital Signs (Current):   Vitals:    10/01/24 1215   BP: 106/75   Pulse: 80   Resp: 18   Temp: 97.8 °F (36.6 °C)   TempSrc: Temporal   SpO2: 97%   Weight: 98.9 kg (218 lb)   Height: 1.702 m (5' 7\")                                              BP Readings from Last 3 Encounters:   10/01/24 106/75   09/25/24 103/65   09/10/24 104/70       NPO Status: Time of last liquid consumption: 1900                        Time of last solid consumption: 1900                        Date of last liquid consumption: 09/30/24                        Date of last solid food consumption: 09/30/24    BMI:   Wt Readings from Last 3 Encounters:   10/01/24 98.9 kg (218 lb)   09/25/24 98.9 kg (218 lb)   09/10/24 99.5 kg (219 lb 6.4 oz)     Body mass index is 34.14 kg/m².    CBC:   Lab Results   Component Value Date/Time    WBC 5.8 09/25/2024 11:33 AM    RBC 4.28 09/25/2024 11:33 AM    HGB 11.5 09/25/2024 11:33 AM    HCT 36.5 09/25/2024 11:33 AM    MCV 85.3 09/25/2024 11:33 AM    RDW 15.8 09/25/2024 11:33 AM     09/25/2024 11:33 AM       CMP:   Lab Results   Component Value Date/Time     04/23/2024 01:03 PM    K 4.1 04/23/2024 01:03 PM    K 3.8 07/19/2022 09:15 PM     04/23/2024 01:03 PM    CO2 22 04/23/2024 01:03 PM    BUN 15 04/23/2024 01:03 PM    CREATININE 0.7 04/23/2024 01:03 PM    GFRAA >60 08/24/2022 06:48 PM    LABGLOM >90 04/23/2024 01:03 PM    GLUCOSE 89 04/23/2024 01:03 PM    CALCIUM 9.1 04/23/2024 01:03 PM    BILITOT 0.4 04/23/2024 01:03 PM    ALKPHOS 65 04/23/2024 01:03 PM    AST 15 04/23/2024 01:03 PM    ALT 9 04/23/2024 01:03 PM       POC Tests: No results for input(s): \"POCGLU\", \"POCNA\", \"POCK\", \"POCCL\", \"POCBUN\", \"POCHEMO\", \"POCHCT\" in the last 72

## 2024-10-02 LAB
ALBUMIN SERPL-MCNC: 3.7 G/DL (ref 3.5–5.2)
ALP SERPL-CCNC: 48 U/L (ref 35–104)
ALT SERPL-CCNC: 9 U/L (ref 0–32)
ANION GAP SERPL CALCULATED.3IONS-SCNC: 10 MMOL/L (ref 7–16)
AST SERPL-CCNC: 17 U/L (ref 0–31)
BILIRUB SERPL-MCNC: 0.5 MG/DL (ref 0–1.2)
BUN SERPL-MCNC: 12 MG/DL (ref 6–20)
CALCIUM SERPL-MCNC: 8.5 MG/DL (ref 8.6–10.2)
CHLORIDE SERPL-SCNC: 103 MMOL/L (ref 98–107)
CO2 SERPL-SCNC: 23 MMOL/L (ref 22–29)
CREAT SERPL-MCNC: 0.7 MG/DL (ref 0.5–1)
ERYTHROCYTE [DISTWIDTH] IN BLOOD BY AUTOMATED COUNT: 16 % (ref 11.5–15)
GFR, ESTIMATED: >90 ML/MIN/1.73M2
GLUCOSE SERPL-MCNC: 128 MG/DL (ref 74–99)
HCT VFR BLD AUTO: 32.7 % (ref 34–48)
HGB BLD-MCNC: 10.2 G/DL (ref 11.5–15.5)
MCH RBC QN AUTO: 27.1 PG (ref 26–35)
MCHC RBC AUTO-ENTMCNC: 31.2 G/DL (ref 32–34.5)
MCV RBC AUTO: 86.7 FL (ref 80–99.9)
MICROORGANISM SPEC CULT: NO GROWTH
PLATELET # BLD AUTO: 248 K/UL (ref 130–450)
PMV BLD AUTO: 11.1 FL (ref 7–12)
POTASSIUM SERPL-SCNC: 4.5 MMOL/L (ref 3.5–5)
PROT SERPL-MCNC: 6.4 G/DL (ref 6.4–8.3)
RBC # BLD AUTO: 3.77 M/UL (ref 3.5–5.5)
SERVICE CMNT-IMP: NORMAL
SODIUM SERPL-SCNC: 136 MMOL/L (ref 132–146)
SPECIMEN DESCRIPTION: NORMAL
WBC OTHER # BLD: 9 K/UL (ref 4.5–11.5)

## 2024-10-02 PROCEDURE — 2700000000 HC OXYGEN THERAPY PER DAY

## 2024-10-02 PROCEDURE — 99024 POSTOP FOLLOW-UP VISIT: CPT | Performed by: OBSTETRICS & GYNECOLOGY

## 2024-10-02 PROCEDURE — 6370000000 HC RX 637 (ALT 250 FOR IP): Performed by: OBSTETRICS & GYNECOLOGY

## 2024-10-02 PROCEDURE — 2580000003 HC RX 258: Performed by: OBSTETRICS & GYNECOLOGY

## 2024-10-02 PROCEDURE — 36415 COLL VENOUS BLD VENIPUNCTURE: CPT

## 2024-10-02 PROCEDURE — 85027 COMPLETE CBC AUTOMATED: CPT

## 2024-10-02 PROCEDURE — 80053 COMPREHEN METABOLIC PANEL: CPT

## 2024-10-02 PROCEDURE — 1200000000 HC SEMI PRIVATE

## 2024-10-02 PROCEDURE — 6360000002 HC RX W HCPCS: Performed by: OBSTETRICS & GYNECOLOGY

## 2024-10-02 RX ORDER — OXYCODONE AND ACETAMINOPHEN 5; 325 MG/1; MG/1
2 TABLET ORAL EVERY 8 HOURS PRN
Status: DISCONTINUED | OUTPATIENT
Start: 2024-10-02 | End: 2024-10-03 | Stop reason: HOSPADM

## 2024-10-02 RX ORDER — DOCUSATE SODIUM 100 MG/1
100 CAPSULE, LIQUID FILLED ORAL DAILY
Status: DISCONTINUED | OUTPATIENT
Start: 2024-10-02 | End: 2024-10-03 | Stop reason: HOSPADM

## 2024-10-02 RX ORDER — IBUPROFEN 800 MG/1
800 TABLET, FILM COATED ORAL EVERY 8 HOURS PRN
Status: DISCONTINUED | OUTPATIENT
Start: 2024-10-02 | End: 2024-10-03 | Stop reason: HOSPADM

## 2024-10-02 RX ADMIN — SODIUM CHLORIDE, POTASSIUM CHLORIDE, SODIUM LACTATE AND CALCIUM CHLORIDE 125 ML: 600; 310; 30; 20 INJECTION, SOLUTION INTRAVENOUS at 08:46

## 2024-10-02 RX ADMIN — ENOXAPARIN SODIUM 40 MG: 100 INJECTION SUBCUTANEOUS at 08:58

## 2024-10-02 RX ADMIN — KETOROLAC TROMETHAMINE 30 MG: 30 INJECTION, SOLUTION INTRAMUSCULAR at 05:08

## 2024-10-02 RX ADMIN — HYDROMORPHONE HYDROCHLORIDE 0.5 MG: 1 INJECTION, SOLUTION INTRAMUSCULAR; INTRAVENOUS; SUBCUTANEOUS at 18:23

## 2024-10-02 RX ADMIN — WATER 1000 MG: 1 INJECTION INTRAMUSCULAR; INTRAVENOUS; SUBCUTANEOUS at 08:54

## 2024-10-02 RX ADMIN — HYDROMORPHONE HYDROCHLORIDE 0.5 MG: 1 INJECTION, SOLUTION INTRAMUSCULAR; INTRAVENOUS; SUBCUTANEOUS at 03:17

## 2024-10-02 RX ADMIN — KETOROLAC TROMETHAMINE 30 MG: 30 INJECTION, SOLUTION INTRAMUSCULAR at 11:33

## 2024-10-02 RX ADMIN — BISACODYL 5 MG: 5 TABLET, COATED ORAL at 08:58

## 2024-10-02 RX ADMIN — OXYCODONE HYDROCHLORIDE 5 MG: 5 TABLET ORAL at 08:43

## 2024-10-02 RX ADMIN — DOCUSATE SODIUM 100 MG: 100 CAPSULE, LIQUID FILLED ORAL at 08:58

## 2024-10-02 RX ADMIN — HYDROMORPHONE HYDROCHLORIDE 0.5 MG: 1 INJECTION, SOLUTION INTRAMUSCULAR; INTRAVENOUS; SUBCUTANEOUS at 21:27

## 2024-10-02 RX ADMIN — HYDROMORPHONE HYDROCHLORIDE 0.5 MG: 1 INJECTION, SOLUTION INTRAMUSCULAR; INTRAVENOUS; SUBCUTANEOUS at 00:16

## 2024-10-02 RX ADMIN — ONDANSETRON 4 MG: 2 INJECTION INTRAMUSCULAR; INTRAVENOUS at 17:20

## 2024-10-02 RX ADMIN — HYDROMORPHONE HYDROCHLORIDE 0.5 MG: 1 INJECTION, SOLUTION INTRAMUSCULAR; INTRAVENOUS; SUBCUTANEOUS at 13:23

## 2024-10-02 ASSESSMENT — PAIN DESCRIPTION - LOCATION
LOCATION: ABDOMEN
LOCATION: ABDOMEN;BACK
LOCATION: ABDOMEN
LOCATION: ABDOMEN
LOCATION: ABDOMEN;BACK

## 2024-10-02 ASSESSMENT — PAIN DESCRIPTION - DESCRIPTORS
DESCRIPTORS: DISCOMFORT;SORE;TENDER
DESCRIPTORS: ACHING;DISCOMFORT;THROBBING
DESCRIPTORS: DISCOMFORT;SORE;TENDER
DESCRIPTORS: ACHING
DESCRIPTORS: ACHING;DISCOMFORT;SORE

## 2024-10-02 ASSESSMENT — PAIN SCALES - GENERAL
PAINLEVEL_OUTOF10: 7
PAINLEVEL_OUTOF10: 7
PAINLEVEL_OUTOF10: 5
PAINLEVEL_OUTOF10: 3
PAINLEVEL_OUTOF10: 6
PAINLEVEL_OUTOF10: 7
PAINLEVEL_OUTOF10: 7
PAINLEVEL_OUTOF10: 3
PAINLEVEL_OUTOF10: 7

## 2024-10-02 ASSESSMENT — PAIN SCALES - WONG BAKER: WONGBAKER_NUMERICALRESPONSE: HURTS A LITTLE BIT

## 2024-10-02 ASSESSMENT — PAIN DESCRIPTION - ORIENTATION
ORIENTATION: LOWER
ORIENTATION: LOWER
ORIENTATION: RIGHT
ORIENTATION: LOWER
ORIENTATION: LOWER

## 2024-10-02 NOTE — PROGRESS NOTES
Department of Gynecology  Attending Progress Note          SUBJECTIVE: Postop day #1 status post LEONEL/LSO with extensive lysis adhesions and scar revision.    OBJECTIVE:      Patient doing well this a.m.  Noted to have good pain control with somewhat somnolent this a.m.  Santoyo catheter noted be draining clear urine.  Dressing noted be clean dry intact with no drainage.  Patient states this morning she is hungry but has not passed any flatus.         Physical Exam  VITALS:  BP (!) 98/49   Pulse 70   Temp 98.1 °F (36.7 °C) (Oral)   Resp 16   Ht 1.702 m (5' 7\")   Wt 98.9 kg (218 lb)   LMP 2024 (Approximate)   SpO2 98%   BMI 34.14 kg/m²   TEMPERATURE:  Current - Temp: 98.1 °F (36.7 °C); Max - Temp  Av °F (36.7 °C)  Min: 97.8 °F (36.6 °C)  Max: 98.3 °F (36.8 °C)  Santoyo draining clear urine is noted above  CONSTITUTIONAL:  somnolent, alert, cooperative, no apparent distress, and appears stated age  EYES:  Lids and lashes normal, pupils equal, round and reactive to light, extra ocular muscles intact, sclera clear, conjunctiva normal  ENT:  Normocephalic, without obvious abnormality, atraumatic, sinuses nontender on palpation, external ears without lesions, oral pharynx with moist mucus membranes, tonsils without erythema or exudates, gums normal and good dentition.  NECK:  Supple, symmetrical, trachea midline, no adenopathy, thyroid symmetric, not enlarged and no tenderness, skin normal  HEMATOLOGIC/LYMPHATICS:  no cervical lymphadenopathy  BACK:  Symmetric, no curvature, spinous processes are non-tender on palpation, paraspinous muscles are non-tender on palpation, no costal vertebral tenderness  LUNGS:  No increased work of breathing, good air exchange, clear to auscultation bilaterally, no crackles or wheezing  CARDIOVASCULAR:  Normal apical impulse, regular rate and rhythm, normal S1 and S2, no S3 or S4, and no murmur noted  ABDOMEN:  No scars, normal bowel sounds, soft, non-distended, non-tender, no  masses palpated, no hepatosplenomegally and incision clean dry and intact.  No drainage from incision.  CHEST/BREASTS: Normal inspection  GENITAL/URINARY: Labia noted to be normal.  Santoyo's patent intact and draining clear urine.  Pfannenstiel skin incision that was revised during surgery noted to be healing well dressing left in place noted be CDI  MUSCULOSKELETAL:  There is no redness, warmth, or swelling of the joints.  Full range of motion noted.  Motor strength is 5 out of 5 all extremities bilaterally.  Tone is normal.  NEUROLOGIC:  Awake, alert, oriented to name, place and time.  Cranial nerves II-XII are grossly intact.  Motor is 5 out of 5 bilaterally.  Cerebellar finger to nose, heel to shin intact.  Sensory is intact.  Babinski down going, Romberg negative, and gait is normal.  SKIN:  no bruising or bleeding and normal skin color, texture, turgor    DATA:  Weight labs this a.m.    ASSESSMENT AND PLAN:    Principal Problem:    Pelvic pain syndrome  Plan: Status post hysterectomy postop day #1.  DC Santoyo catheter at 6 PM.  Begin ambulating patient today every shift.  Begin clear diet.  Advance diet as tolerated.  Patient may shower in p.m.  Anticipate discharge either late tomorrow or early Friday, depending on hospital course.  Active Problems:    Pelvic pain  Plan: As above

## 2024-10-02 NOTE — PLAN OF CARE
Problem: Discharge Planning  Goal: Discharge to home or other facility with appropriate resources  10/2/2024 1012 by Osvaldo Young RN  Outcome: Progressing     Problem: Chronic Conditions and Co-morbidities  Goal: Patient's chronic conditions and co-morbidity symptoms are monitored and maintained or improved  10/2/2024 1012 by Osvaldo Young RN  Outcome: Progressing     Problem: Pain  Goal: Verbalizes/displays adequate comfort level or baseline comfort level  10/2/2024 1012 by Osvaldo Young RN  Outcome: Progressing     Problem: ABCDS Injury Assessment  Goal: Absence of physical injury  10/2/2024 1012 by Osvaldo Young RN  Outcome: Progressing     Problem: Musculoskeletal - Adult  Goal: Return ADL status to a safe level of function  Outcome: Progressing     Problem: Hematologic - Adult  Goal: Maintains hematologic stability  Outcome: Progressing

## 2024-10-03 VITALS
TEMPERATURE: 98.4 F | OXYGEN SATURATION: 96 % | DIASTOLIC BLOOD PRESSURE: 56 MMHG | BODY MASS INDEX: 34.21 KG/M2 | HEART RATE: 89 BPM | HEIGHT: 67 IN | SYSTOLIC BLOOD PRESSURE: 102 MMHG | RESPIRATION RATE: 16 BRPM | WEIGHT: 218 LBS

## 2024-10-03 PROBLEM — Z36.89 NST (NON-STRESS TEST) REACTIVE: Status: RESOLVED | Noted: 2023-01-11 | Resolved: 2024-10-03

## 2024-10-03 PROBLEM — O28.8 NON-REACTIVE NST (NON-STRESS TEST): Status: RESOLVED | Noted: 2023-01-14 | Resolved: 2024-10-03

## 2024-10-03 PROCEDURE — 99024 POSTOP FOLLOW-UP VISIT: CPT | Performed by: OBSTETRICS & GYNECOLOGY

## 2024-10-03 PROCEDURE — 6370000000 HC RX 637 (ALT 250 FOR IP): Performed by: OBSTETRICS & GYNECOLOGY

## 2024-10-03 PROCEDURE — 6360000002 HC RX W HCPCS: Performed by: OBSTETRICS & GYNECOLOGY

## 2024-10-03 PROCEDURE — 2700000000 HC OXYGEN THERAPY PER DAY

## 2024-10-03 PROCEDURE — 2580000003 HC RX 258: Performed by: OBSTETRICS & GYNECOLOGY

## 2024-10-03 RX ORDER — IBUPROFEN 800 MG/1
800 TABLET, FILM COATED ORAL EVERY 8 HOURS PRN
Qty: 30 TABLET | Refills: 1 | Status: SHIPPED | OUTPATIENT
Start: 2024-10-03

## 2024-10-03 RX ORDER — OXYCODONE AND ACETAMINOPHEN 5; 325 MG/1; MG/1
2 TABLET ORAL EVERY 6 HOURS PRN
Qty: 20 TABLET | Refills: 0 | Status: SHIPPED | OUTPATIENT
Start: 2024-10-03 | End: 2024-10-06

## 2024-10-03 RX ORDER — PSEUDOEPHEDRINE HCL 30 MG
100 TABLET ORAL DAILY
Qty: 60 CAPSULE | Refills: 0 | Status: SHIPPED | OUTPATIENT
Start: 2024-10-04

## 2024-10-03 RX ADMIN — ENOXAPARIN SODIUM 40 MG: 100 INJECTION SUBCUTANEOUS at 08:02

## 2024-10-03 RX ADMIN — SODIUM CHLORIDE, POTASSIUM CHLORIDE, SODIUM LACTATE AND CALCIUM CHLORIDE 125 ML: 600; 310; 30; 20 INJECTION, SOLUTION INTRAVENOUS at 01:38

## 2024-10-03 RX ADMIN — HYDROMORPHONE HYDROCHLORIDE 0.5 MG: 1 INJECTION, SOLUTION INTRAMUSCULAR; INTRAVENOUS; SUBCUTANEOUS at 08:00

## 2024-10-03 RX ADMIN — HYDROMORPHONE HYDROCHLORIDE 0.5 MG: 1 INJECTION, SOLUTION INTRAMUSCULAR; INTRAVENOUS; SUBCUTANEOUS at 01:36

## 2024-10-03 RX ADMIN — BISACODYL 5 MG: 5 TABLET, COATED ORAL at 08:02

## 2024-10-03 RX ADMIN — DOCUSATE SODIUM 100 MG: 100 CAPSULE, LIQUID FILLED ORAL at 08:02

## 2024-10-03 ASSESSMENT — PAIN SCALES - GENERAL
PAINLEVEL_OUTOF10: 8
PAINLEVEL_OUTOF10: 3
PAINLEVEL_OUTOF10: 7

## 2024-10-03 ASSESSMENT — PAIN SCALES - WONG BAKER: WONGBAKER_NUMERICALRESPONSE: HURTS A LITTLE BIT

## 2024-10-03 ASSESSMENT — PAIN DESCRIPTION - LOCATION: LOCATION: BACK;ABDOMEN

## 2024-10-03 ASSESSMENT — PAIN DESCRIPTION - DESCRIPTORS: DESCRIPTORS: ACHING

## 2024-10-03 NOTE — DISCHARGE INSTRUCTIONS
Your information:  Name: Мария Peters  : 1995    Your instructions:    Discharge home.  Follow up with primary care provider and specialists as directed.  Follow discharge instructions from Dr. Alcantara.  Pelvic rest for 6 weeks.    Signs and symptoms to look out for:  Call 911 anytime you think you may need emergency care. For example, call if:    You passed out (lost consciousness).     You have chest pain, are short of breath, or cough up blood.   Call your doctor now or seek immediate medical care if:    You have pain that does not get better after you take pain medicine.     You cannot pass stools or gas.     You have vaginal discharge that has increased in amount or smells bad.     You are sick to your stomach or cannot drink fluids.     You have loose stitches, or your incision comes open.     Bright red blood has soaked through the bandage over your incision.     You have signs of infection, such as:  Increased pain, swelling, warmth, or redness.  Red streaks leading from the incision.  Pus draining from the incision.  A fever.     You have severe vaginal bleeding. This means that you are soaking through your usual pads every hour for 2 or more hours.     You have signs of a blood clot in your leg (called a deep vein thrombosis), such as:  Pain in your calf, back of the knee, thigh, or groin.  Redness and swelling in your leg.   Watch closely for changes in your health, and be sure to contact your doctor if you have any problems.    What to do after you leave the hospital:    Recommended diet: regular diet    Recommended activity: no heavy lifting until released by your surgeon    The following personal items were collected during your admission and were returned to you:    Belongings  Dental Appliances: None  Vision - Corrective Lenses: None  Hearing Aid: None  Clothing: Shirt, Footwear, Pants, Undergarments, At bedside  Jewelry: None  Body Piercings Removed: N/A  Electronic Devices: Cell Phone,

## 2024-10-03 NOTE — DISCHARGE SUMMARY
GYN discharge summary    Date of admission 10/1/2024    Date of discharge 10/3/2024    Date of procedure 10/1/2024    Procedure: Total abdominal hysterectomy with left salpingo-oophorectomy with extensive lysis adhesion and scar revision.    Complications: None    Reason for admission: As above.  Patient noted to have dyspareunia, chronic pelvic pain with exam consistent with having extensive postoperative adhesions and scarring status post 3  sections.    Hospital course: Patient taken the OR 9 10/1/2024 in which procedure was performed without any complications.  Noted patient had extensive dissection of bladder that was noted to be firmly adherent to the anterior abdominal wall and lower midportion of uterine fundus.  Patient had been admitted to the floor postoperatively and had routine postop care.  Patient had normal Santoyo output with clear urine.  Patient was given routine postoperative care.  Postop day #2 patient was ambulate voiding and tolerating oral p.o. diet.  Patient began 24 hours or 3 doses of Ancef postoperatively.  By the end of postop day number 2 in the afternoon patient was discharged to home    Condition at discharge stable    Patient afebrile was given strict wound care and activity instructions and will follow-up in the office in 1 week.    All patient's questions were answered and was instructed on use of postoperative pain medication.

## 2024-10-03 NOTE — PROGRESS NOTES
Department of Gynecology  Attending Progress Note          SUBJECTIVE: Post op day #2 status post LEONEL LSO with extensive lysis of adhesions and scar revision.    OBJECTIVE:      Patient stable overnight with no complaints.  Pain well-controlled.  Santoyo discontinued overnight patient ambulate voiding tolerating p.o. diet and afebrile.  Hep-Lock IV this a.m.  Patient has completed IV antibiotic course postoperatively.  After discussion patient would like to be discharged home this afternoon.  Was given extensive instructions about wound care and activity along with pelvic rest for 6 weeks.         Physical Exam  VITALS:  BP (!) 102/56   Pulse 89   Temp 98.4 °F (36.9 °C) (Oral)   Resp 16   Ht 1.702 m (5' 7\")   Wt 98.9 kg (218 lb)   LMP 2024 (Approximate)   SpO2 96%   BMI 34.14 kg/m²   TEMPERATURE:  Current - Temp: 98.4 °F (36.9 °C); Max - Temp  Av.3 °F (36.8 °C)  Min: 98.1 °F (36.7 °C)  Max: 98.4 °F (36.9 °C)  24HR BLOOD PRESSURE RANGE:  Systolic (24hrs), Av , Min:93 , Max:104  ; Diastolic (24hrs), Av, Min:53, Max:62   8HR BLOOD PRESSURE RANGE:  BP: ()/(53-56)   24HR PULSE RANGE:  Pulse  Av.8  Min: 85  Max: 93  8HR PULSE RANGE:  Pulse:  [85-89]   24HR INTAKE/OUTPUT:    Intake/Output Summary (Last 24 hours) at 10/3/2024 0859  Last data filed at 10/3/2024 0129  Gross per 24 hour   Intake --   Output 1450 ml   Net -1450 ml     8HR INTAKE OUTPUT:  No intake/output data recorded.  URINARY CATHETER OUTPUT (Santoyo):     DRAIN/TUBE OUTPUT:     VENT SETTINGS:     Additional Respiratory Assessments  Pulse: 89  Respirations: 16  SpO2: 96 %    No change in previous exam.  Lungs are clear to auscultation bilaterally.  Is regular rate and rhythm positive S1-2.  There is no clubbing cyanosis erythema in the lower extremities.    Dressing was discontinued noted be clean dry and intact upon removal.  Incision is normal to inspection and palpation is healing well.  She was reapproximated.  No

## 2024-10-03 NOTE — PLAN OF CARE
Problem: Discharge Planning  Goal: Discharge to home or other facility with appropriate resources  10/2/2024 2156 by Lena Abreu RN  Outcome: Progressing  10/2/2024 2155 by Lena Abreu RN  Outcome: Progressing  10/2/2024 1012 by Osvaldo Young RN  Outcome: Progressing     Problem: Chronic Conditions and Co-morbidities  Goal: Patient's chronic conditions and co-morbidity symptoms are monitored and maintained or improved  10/2/2024 2156 by Lena Abreu RN  Outcome: Progressing  10/2/2024 2155 by Lena Abreu RN  Outcome: Progressing  10/2/2024 1012 by Osvaldo Young RN  Outcome: Progressing     Problem: Pain  Goal: Verbalizes/displays adequate comfort level or baseline comfort level  10/2/2024 2156 by Lena Abreu RN  Outcome: Progressing  10/2/2024 2155 by Lena Abreu RN  Outcome: Progressing  10/2/2024 1012 by Osvaldo Young RN  Outcome: Progressing     Problem: ABCDS Injury Assessment  Goal: Absence of physical injury  10/2/2024 2156 by Lena Abreu RN  Outcome: Progressing  10/2/2024 2155 by Lena Abreu RN  Outcome: Progressing  10/2/2024 1012 by Osvaldo Young RN  Outcome: Progressing     Problem: Musculoskeletal - Adult  Goal: Return ADL status to a safe level of function  10/2/2024 2156 by Lena Abreu RN  Outcome: Progressing  10/2/2024 1012 by Osvaldo Young RN  Outcome: Progressing     Problem: Hematologic - Adult  Goal: Maintains hematologic stability  10/2/2024 2156 by Lena Abreu RN  Outcome: Progressing  10/2/2024 1012 by Osvaldo Young RN  Outcome: Progressing

## 2024-10-03 NOTE — PROGRESS NOTES
CLINICAL PHARMACY NOTE: MEDS TO BEDS    Total # of Prescriptions Filled: 3   The following medications were delivered to the patient:  Percocet 5/325 mg  Docusate sodium 100 mg  Ibuprofen 800 mg    Additional Documentation:

## 2024-10-04 LAB — SURGICAL PATHOLOGY REPORT: NORMAL

## 2024-10-07 NOTE — PROGRESS NOTES
Physician Progress Note      PATIENT:               STEPH PIERCE  CSN #:                  [id number]  :                       1995  ADMIT DATE:       10/1/2024 12:04 PM  DISCH DATE:        10/3/2024 1:01 PM  RESPONDING  PROVIDER #:        Dontae Alcantara MD          QUERY TEXT:    Patient admitted with intrabdominal adhesions, noted to have decreased in   hemoglobin. If possible, please document in progress notes and discharge   summary if you are evaluating and/or treating any of the following:    The medical record reflects the following:  Risk Factors: female,s/p surgery  Clinical Indicators: op 10/01-Total abdominal hysterectomy with left   salpingo-oophorectomy.  Since of lysis of adhesions.  Scar revision.  HB -pre op-11.4, post op-10.2  Hematocrit-pre op-36.7 post op- 32.7  Treatment: serial lab monitoring  Thank you  ABRAHAN Kevin,CDS  Options provided:  -- Drop in hemoglobin/hematocrit value  -- Post operative acute blood loss anemia  -- decrease in Hemoglobin  not clinically significant  -- Other - I will add my own diagnosis  -- Disagree - Not applicable / Not valid  -- Disagree - Clinically unable to determine / Unknown  -- Refer to Clinical Documentation Reviewer    PROVIDER RESPONSE TEXT:    Provider disagreed with this query.  Patient had scarring from previous C sections and decrease in hemoglobin was   from surgical intervention.    Query created by: Dorita Davidson on 10/7/2024 10:23 AM      Electronically signed by:  Dontae Alcantara MD 10/7/2024 10:28 AM

## 2024-10-09 ENCOUNTER — OFFICE VISIT (OUTPATIENT)
Age: 29
End: 2024-10-09

## 2024-10-09 VITALS
OXYGEN SATURATION: 99 % | WEIGHT: 217.1 LBS | BODY MASS INDEX: 34.07 KG/M2 | SYSTOLIC BLOOD PRESSURE: 108 MMHG | HEIGHT: 67 IN | HEART RATE: 85 BPM | DIASTOLIC BLOOD PRESSURE: 70 MMHG

## 2024-10-09 DIAGNOSIS — Z98.890 POST-OPERATIVE STATE: Primary | ICD-10-CM

## 2024-10-09 NOTE — PROGRESS NOTES
Мария Peters is a 29 y.o. female patient.   No diagnosis found.  Past Medical History:   Diagnosis Date    Anxiety     Depression     ADHD    Endometriosis of pelvis 11/30/2018    Gestational diabetes     Miscarriage     Prolonged emergence from general anesthesia     slow to wake up after c section    Wound infection 10/07/2020     Past Surgical History Pertinent Negatives:   Procedure Date Noted    BRAIN SURGERY 10/07/2020    COLON SURGERY 10/07/2020    GASTRECTOMY 10/07/2020    MASTECTOMY 10/07/2020     Scheduled Meds:  Continuous Infusions:  PRN Meds:    Allergies   Allergen Reactions    Pcn [Penicillins] Hives     Active Problems:    * No active hospital problems. *  Resolved Problems:    * No resolved hospital problems. *    Blood pressure 108/70, pulse 85, height 1.702 m (5' 7\"), weight 98.5 kg (217 lb 1.6 oz), last menstrual period 09/13/2024, SpO2 99%, not currently breastfeeding.    Subjective  Objective:  Vital signs (most recent): Blood pressure 108/70, pulse 85, height 1.702 m (5' 7\"), weight 98.5 kg (217 lb 1.6 oz), last menstrual period 09/13/2024, SpO2 99%, not currently breastfeeding.    Patient is postop exam with no complaints.  Afebrile.  Ambulate voiding tolerating p.o. diet with no issues.  Incision noted to be healing well with no abnormalities.  No signs symptoms of infection.  Patient taken Motrin during day for pain control and still has narcotic medication leftover from prior admit.  Discussed with patient to take narcotic medication at night for sleep but to increase activity as tolerated.  Will continue pelvic rest for 6 weeks.    Follow-up in office in 2 weeks.  Call if any issues.    Dontae Alcantara MD  10/9/2024    Chaperone present for exam discussion

## 2024-10-09 NOTE — PROGRESS NOTES
Pt here for incision check after hysterectomy  Having some discharge from incision      Discharge instructions have been discussed with the patient. Patient advised to call our office with any questions or concerns. Voiced understanding.

## 2024-10-10 NOTE — PROGRESS NOTES
Physician Progress Note      PATIENT:               STEPH PIERCE  CSN #:                  [id number]  :                       1995  ADMIT DATE:       10/1/2024 12:04 PM  DISCH DATE:        10/3/2024 1:01 PM  RESPONDING  PROVIDER #:        Dontae Alcantara MD          QUERY TEXT:    Patient admitted with pelvic pain noted in H&P dated . If possible,   please document in progress notes and discharge summary after study the   etiology of the pelvic pain :    The medical record reflects the following:  Risk Factors: female  Clinical Indicators: H&P -perative exam consistent with presentation of   adenomyosis pelvic pain along with adhesions and scarring from prior    section  OP 10/01- post op-Pelvic pain dysmenorrhea with dyspareunia.  History of 3   prior  sections.  Likely intra-abdominal adhesions and scarring.,   Extensive adhesions to bladder to anterior abdominal wall and lower uterine   fundus.  Surgical pathology-10/01-Endometrium: Secretory phase Myometrium: No   significant pathologic change  Treatment: Total abdominal hysterectomy with left salpingo-oophorectomy.    Since of lysis of adhesions.  Scar revision.  Thank you  ABRAHAN Kevin,CDS  Options provided:  -- pelvic pain due to adhesion of abdominal wall  -- pelvic pain due to adenomyosis  -- pelvic pain due to uterine scarring  -- Other - I will add my own diagnosis  -- Disagree - Not applicable / Not valid  -- Disagree - Clinically unable to determine / Unknown  -- Refer to Clinical Documentation Reviewer    PROVIDER RESPONSE TEXT:    The pelvic pain due to uterine scarring    Query created by: Dorita Davidson on 10/10/2024 10:59 AM      Electronically signed by:  Dontae Alcantara MD 10/10/2024 1:05 PM

## 2024-10-15 ENCOUNTER — OFFICE VISIT (OUTPATIENT)
Age: 29
End: 2024-10-15

## 2024-10-15 VITALS
BODY MASS INDEX: 33.76 KG/M2 | WEIGHT: 215.1 LBS | DIASTOLIC BLOOD PRESSURE: 76 MMHG | SYSTOLIC BLOOD PRESSURE: 119 MMHG | HEART RATE: 94 BPM | HEIGHT: 67 IN | OXYGEN SATURATION: 95 %

## 2024-10-15 DIAGNOSIS — G89.18 POST-OP PAIN: Primary | ICD-10-CM

## 2024-10-15 PROCEDURE — 99024 POSTOP FOLLOW-UP VISIT: CPT | Performed by: OBSTETRICS & GYNECOLOGY

## 2024-10-15 NOTE — PROGRESS NOTES
Subjective:       Мария Peters is a 29 y.o. female who presents to the clinic 2 weeks following status post total abdominal hysterectomy for pelvic pain. Eating a regular diet with difficulty. Bowel movements are Normal.  The patient is not having any pain.     Patient presents with concerns of having incisional dehiscence.  No evidence of dehiscence incisions feel healing well and no signs of infections.  Patient notes that she has a small uneven portion of the skin as it is healing but is not dehisced.  Reassured patient to increase activity as tolerated the incision is normal on exam and follow-up at next postop appointment     Objective:      /76   Pulse 94   Ht 1.702 m (5' 7\")   Wt 97.6 kg (215 lb 1.6 oz)   LMP 09/13/2024 (Approximate)   SpO2 95%   BMI 33.69 kg/m²   General:  alert, appears stated age, and cooperative   Abdomen: soft, bowel sounds active, non-tender   Incision:   healing well, no drainage, no erythema, no hernia, no seroma, no swelling, no dehiscence, incision well approximated       Assessment:      Doing well postoperatively.  Operative findings again reviewed. Pathology report discussed.      Plan:      1. Continue any current medications.  2. Wound care discussed.  3. Pt is to increase activities as tolerated.  4. Follow up: 1 week for suture removal.  5. Anticipated return to work continue postop recovery..     Chaperone present for exam and discussion

## 2024-10-28 ENCOUNTER — OFFICE VISIT (OUTPATIENT)
Age: 29
End: 2024-10-28

## 2024-10-28 VITALS
SYSTOLIC BLOOD PRESSURE: 115 MMHG | DIASTOLIC BLOOD PRESSURE: 75 MMHG | WEIGHT: 217.9 LBS | HEART RATE: 106 BPM | OXYGEN SATURATION: 98 % | BODY MASS INDEX: 34.2 KG/M2 | HEIGHT: 67 IN

## 2024-10-28 DIAGNOSIS — Z98.890 POST-OPERATIVE STATE: Primary | ICD-10-CM

## 2024-10-28 PROCEDURE — 99024 POSTOP FOLLOW-UP VISIT: CPT | Performed by: OBSTETRICS & GYNECOLOGY

## 2024-10-28 NOTE — PROGRESS NOTES
Patient presents for postop exam today.  Patient's approximately 1 month out from LEONEL scar revision with extensive lysis adhesions.  Patient is returned back to normal activity and has increased weightbearing and lifting.  There is no vaginal bleeding.  Noted the patient actually rode pedal bike to appointment today with no issues.    Vital signs stable    Pelvic examination noted the cervical cuff is well-healed with some suture still in place.  Minimal scarring no adhesions.  Incision healing well normal to inspection and palpation.    Increase activities as tolerated but continue pelvic rest.  Follow-up for final postop in 2 weeks.  Patient instructed to increase activity as tolerated and call if any issues.    Chaperone present for exam discussed

## 2024-11-12 ENCOUNTER — OFFICE VISIT (OUTPATIENT)
Age: 29
End: 2024-11-12

## 2024-11-12 VITALS
DIASTOLIC BLOOD PRESSURE: 71 MMHG | HEIGHT: 67 IN | SYSTOLIC BLOOD PRESSURE: 105 MMHG | WEIGHT: 216 LBS | HEART RATE: 77 BPM | BODY MASS INDEX: 33.9 KG/M2

## 2024-11-12 DIAGNOSIS — Z98.890 POST-OPERATIVE STATE: Primary | ICD-10-CM

## 2024-11-12 PROCEDURE — 99024 POSTOP FOLLOW-UP VISIT: CPT | Performed by: OBSTETRICS & GYNECOLOGY

## 2024-11-12 NOTE — PROGRESS NOTES
Pt presents for postop check.  Pt reports some sharp lower abd pain on the R and soreness on L lower abd.  No other concerns at this time.

## 2024-11-12 NOTE — PROGRESS NOTES
Patient presents for final postop exam today.  Patient is ambulating voiding tolerate no p.o. diet and afebrile.  Patient is returned back to normal activity.  Still on pelvic rest for the next 2 weeks.    Pathology reviewed noted to be benign.    Vital signs stable    Physical examination notes the patient has well healed Pfannenstiel skin incision.  No abnormalities of inspection palpation.  Examination notes of cervical vaginal cuff is well-healed with no abnormalities.    Return to normal activity follow-up in the office for annual exam.  Patient increase activity as tolerated.  Continue pelvic rest for next 2 weeks.    Chaperone present for exam discussion

## 2025-01-02 ENCOUNTER — OFFICE VISIT (OUTPATIENT)
Age: 30
End: 2025-01-02
Payer: COMMERCIAL

## 2025-01-02 VITALS
DIASTOLIC BLOOD PRESSURE: 71 MMHG | HEIGHT: 67 IN | TEMPERATURE: 98.2 F | SYSTOLIC BLOOD PRESSURE: 101 MMHG | RESPIRATION RATE: 16 BRPM | BODY MASS INDEX: 33.92 KG/M2 | HEART RATE: 86 BPM | OXYGEN SATURATION: 98 % | WEIGHT: 216.1 LBS

## 2025-01-02 DIAGNOSIS — B37.2 INTERTRIGINOUS CANDIDIASIS: Primary | ICD-10-CM

## 2025-01-02 PROCEDURE — 99459 PELVIC EXAMINATION: CPT | Performed by: OBSTETRICS & GYNECOLOGY

## 2025-01-02 PROCEDURE — 1036F TOBACCO NON-USER: CPT | Performed by: OBSTETRICS & GYNECOLOGY

## 2025-01-02 PROCEDURE — 99213 OFFICE O/P EST LOW 20 MIN: CPT | Performed by: OBSTETRICS & GYNECOLOGY

## 2025-01-02 PROCEDURE — G8417 CALC BMI ABV UP PARAM F/U: HCPCS | Performed by: OBSTETRICS & GYNECOLOGY

## 2025-01-02 PROCEDURE — G8427 DOCREV CUR MEDS BY ELIG CLIN: HCPCS | Performed by: OBSTETRICS & GYNECOLOGY

## 2025-01-02 RX ORDER — NYSTATIN 100000 [USP'U]/G
POWDER TOPICAL
Qty: 60 G | Refills: 2 | Status: SHIPPED | OUTPATIENT
Start: 2025-01-02

## 2025-01-02 NOTE — PROGRESS NOTES
Pt presents for hole near incision   Hx, pharmacy, medications and allergies reviewed with patient   Minor bruising around incision site and pain with stretching of the skin

## 2025-01-02 NOTE — PROGRESS NOTES
Subjective:       Мария Peters is a 29 y.o. female here for postop incision check with intertriginous Candida..  Current Complaints: 29-year-old multiparous white female presents status post total abdominal hysterectomy few months ago noted to have small amount of drainage and intertrigo candidiasis over the central portion of her previous incision and pannus.  Incision otherwise well-healed with no other abnormalities..  Personal Health Questionnaire Reviewed: yes.     Gynecologic History  Patient's last menstrual period was 2024 (approximate).  Contraception: none    OB History          4    Para   3    Term   3       0    AB   1    Living   3         SAB   1    IAB        Ectopic        Molar        Multiple   1    Live Births   3              Past Medical History:   Diagnosis Date    Anxiety     Depression     ADHD    Endometriosis of pelvis 2018    Gestational diabetes     Miscarriage     Prolonged emergence from general anesthesia     slow to wake up after c section    Wound infection 10/07/2020     Patient Active Problem List    Diagnosis Date Noted    Liveborn infant by  delivery 2023    Abdominal pain 2023    Abdominal pain affecting pregnancy 2023    33 weeks gestation of pregnancy 2023    Complicated pregnancy, third trimester 2023    NST (non-stress test) reactive on fetal surveillance 2023    Pelvic pain in female 2022    Pelvic pain syndrome 10/01/2024    Pelvic pain 10/01/2024    Wound infection 10/07/2020    Liveborn by  2020    Complication of pregnancy in third trimester 2020    Normal pregnancy in third trimester 2020     delivery delivered     37 weeks gestation of pregnancy 2020    27 weeks gestation of pregnancy 2020    24 weeks gestation of pregnancy 2020    Miscarriage 2019    Retained placenta 2019    Endometriosis of pelvis 2018    Chronic

## 2025-06-06 ENCOUNTER — OFFICE VISIT (OUTPATIENT)
Age: 30
End: 2025-06-06

## 2025-06-06 VITALS
DIASTOLIC BLOOD PRESSURE: 71 MMHG | HEART RATE: 83 BPM | RESPIRATION RATE: 18 BRPM | SYSTOLIC BLOOD PRESSURE: 103 MMHG | OXYGEN SATURATION: 98 % | TEMPERATURE: 97.1 F | BODY MASS INDEX: 34.33 KG/M2 | HEIGHT: 67 IN | WEIGHT: 218.7 LBS

## 2025-06-06 DIAGNOSIS — L76.82 INCISIONAL PAIN: Primary | ICD-10-CM

## 2025-06-06 ASSESSMENT — PATIENT HEALTH QUESTIONNAIRE - PHQ9
SUM OF ALL RESPONSES TO PHQ QUESTIONS 1-9: 0
2. FEELING DOWN, DEPRESSED OR HOPELESS: NOT AT ALL
SUM OF ALL RESPONSES TO PHQ QUESTIONS 1-9: 0
SUM OF ALL RESPONSES TO PHQ QUESTIONS 1-9: 0
1. LITTLE INTEREST OR PLEASURE IN DOING THINGS: NOT AT ALL
SUM OF ALL RESPONSES TO PHQ QUESTIONS 1-9: 0

## 2025-06-06 NOTE — PROGRESS NOTES
Subjective:       Мария Peters is a 29 y.o. female here for left-sided abdominal pain mid to upper quadrant.  Current Complaints: 28-year-old multiparous white female G4, P3 with prior  section status post hysterectomy presents for abdominal pain noted to be just lateral left portion incision in the upper abdomen the midportion of abdomen.  Incision palpates normally no other abnormalities noted likely patient has some scarring from prior incision but no hernia and reassured patient should resolve over time.  Personal Health Questionnaire Reviewed: yes.     Gynecologic History  Patient's last menstrual period was 2024 (approximate).  Contraception: none    OB History          4    Para   3    Term   3       0    AB   1    Living   3         SAB   1    IAB        Ectopic        Molar        Multiple   1    Live Births   3              Past Medical History:   Diagnosis Date    Anxiety     Depression     ADHD    Endometriosis of pelvis 2018    Gestational diabetes     Miscarriage     Prolonged emergence from general anesthesia     slow to wake up after c section    Wound infection 10/07/2020     Patient Active Problem List    Diagnosis Date Noted    Liveborn infant by  delivery 2023    Abdominal pain 2023    Abdominal pain affecting pregnancy 2023    33 weeks gestation of pregnancy 2023    Complicated pregnancy, third trimester 2023    NST (non-stress test) reactive on fetal surveillance 2023    Pelvic pain in female 2022    Pelvic pain syndrome 10/01/2024    Pelvic pain 10/01/2024    Wound infection 10/07/2020    Liveborn by  2020    Complication of pregnancy in third trimester 2020    Normal pregnancy in third trimester 2020     delivery delivered     37 weeks gestation of pregnancy 2020    27 weeks gestation of pregnancy 2020    24 weeks gestation of pregnancy 2020

## 2025-08-25 ENCOUNTER — HOSPITAL ENCOUNTER (OUTPATIENT)
Age: 30
Discharge: HOME | End: 2025-08-25
Payer: COMMERCIAL

## 2025-08-25 LAB
25(OH)D3 SERPL-MCNC: 16.7 NG/ML (ref 30–100)
ALBUMIN SERPL-MCNC: 3.8 G/DL (ref 3.5–5.2)
ALP SERPL-CCNC: 45 U/L (ref 35–104)
ALT SERPL-CCNC: 9 U/L (ref 0–35)
ANION GAP SERPL CALCULATED.3IONS-SCNC: 11 MMOL/L (ref 7–16)
AST SERPL-CCNC: 19 U/L (ref 0–35)
BASOPHILS # BLD: 0.02 K/UL (ref 0–0.2)
BASOPHILS NFR BLD: 0 % (ref 0–2)
BILIRUB SERPL-MCNC: 0.2 MG/DL (ref 0–1.2)
BILIRUB UR QL STRIP: NEGATIVE
BUN SERPL-MCNC: 7 MG/DL (ref 6–20)
CALCIUM SERPL-MCNC: 8.6 MG/DL (ref 8.6–10)
CHLORIDE SERPL-SCNC: 108 MMOL/L (ref 98–107)
CHOLEST SERPL-MCNC: 165 MG/DL
CLARITY UR: CLEAR
CO2 SERPL-SCNC: 22 MMOL/L (ref 22–29)
COLOR UR: YELLOW
COMMENT: NORMAL
CREAT SERPL-MCNC: 0.7 MG/DL (ref 0.5–1)
EOSINOPHIL # BLD: 0.12 K/UL (ref 0.05–0.5)
EOSINOPHILS RELATIVE PERCENT: 2 % (ref 0–6)
ERYTHROCYTE [DISTWIDTH] IN BLOOD BY AUTOMATED COUNT: 14.8 % (ref 11.5–15)
GFR, ESTIMATED: >90 ML/MIN/1.73M2
GLUCOSE SERPL-MCNC: 94 MG/DL (ref 74–99)
GLUCOSE UR STRIP-MCNC: NEGATIVE MG/DL
HBA1C MFR BLD: 5.3 % (ref 4–5.6)
HCT VFR BLD AUTO: 35.6 % (ref 34–48)
HDLC SERPL-MCNC: 49 MG/DL
HGB BLD-MCNC: 11.2 G/DL (ref 11.5–15.5)
HGB UR QL STRIP.AUTO: NEGATIVE
IMM GRANULOCYTES # BLD AUTO: <0.03 K/UL (ref 0–0.58)
IMM GRANULOCYTES NFR BLD: 0 % (ref 0–5)
KETONES UR STRIP-MCNC: NEGATIVE MG/DL
LDLC SERPL CALC-MCNC: 78 MG/DL
LEUKOCYTE ESTERASE UR QL STRIP: NEGATIVE
LYMPHOCYTES NFR BLD: 1.47 K/UL (ref 1.5–4)
LYMPHOCYTES RELATIVE PERCENT: 27 % (ref 20–42)
MCH RBC QN AUTO: 27.6 PG (ref 26–35)
MCHC RBC AUTO-ENTMCNC: 31.5 G/DL (ref 32–34.5)
MCV RBC AUTO: 87.7 FL (ref 80–99.9)
MONOCYTES NFR BLD: 0.53 K/UL (ref 0.1–0.95)
MONOCYTES NFR BLD: 10 % (ref 2–12)
NEUTROPHILS NFR BLD: 61 % (ref 43–80)
NEUTS SEG NFR BLD: 3.31 K/UL (ref 1.8–7.3)
NITRITE UR QL STRIP: NEGATIVE
PH UR STRIP: 6 [PH] (ref 5–8)
PLATELET # BLD AUTO: 220 K/UL (ref 130–450)
PMV BLD AUTO: 10.9 FL (ref 7–12)
POTASSIUM SERPL-SCNC: 4 MMOL/L (ref 3.5–5.1)
PROT SERPL-MCNC: 5.9 G/DL (ref 6.4–8.3)
PROT UR STRIP-MCNC: NEGATIVE MG/DL
RBC # BLD AUTO: 4.06 M/UL (ref 3.5–5.5)
SODIUM SERPL-SCNC: 141 MMOL/L (ref 136–145)
SP GR UR STRIP: 1.02 (ref 1–1.03)
T4 FREE SERPL-MCNC: 1 NG/DL (ref 0.9–1.7)
TRIGL SERPL-MCNC: 189 MG/DL
TSH SERPL DL<=0.05 MIU/L-ACNC: 1.02 UIU/ML (ref 0.27–4.2)
UROBILINOGEN UR STRIP-ACNC: 1 EU/DL (ref 0–1)
VLDLC SERPL CALC-MCNC: 38 MG/DL
WBC OTHER # BLD: 5.5 K/UL (ref 4.5–11.5)

## 2025-08-25 PROCEDURE — 82306 VITAMIN D 25 HYDROXY: CPT

## 2025-08-25 PROCEDURE — 83036 HEMOGLOBIN GLYCOSYLATED A1C: CPT

## 2025-08-25 PROCEDURE — 36415 COLL VENOUS BLD VENIPUNCTURE: CPT

## 2025-08-25 PROCEDURE — 80061 LIPID PANEL: CPT

## 2025-08-25 PROCEDURE — 85025 COMPLETE CBC W/AUTO DIFF WBC: CPT

## 2025-08-25 PROCEDURE — 84439 ASSAY OF FREE THYROXINE: CPT

## 2025-08-25 PROCEDURE — 84443 ASSAY THYROID STIM HORMONE: CPT

## 2025-08-25 PROCEDURE — 80053 COMPREHEN METABOLIC PANEL: CPT

## 2025-08-25 PROCEDURE — 81003 URINALYSIS AUTO W/O SCOPE: CPT

## 2025-08-27 ENCOUNTER — OFFICE VISIT (OUTPATIENT)
Age: 30
End: 2025-08-27
Payer: COMMERCIAL

## 2025-08-27 VITALS
RESPIRATION RATE: 18 BRPM | DIASTOLIC BLOOD PRESSURE: 70 MMHG | BODY MASS INDEX: 33.42 KG/M2 | SYSTOLIC BLOOD PRESSURE: 107 MMHG | WEIGHT: 212.9 LBS | HEIGHT: 67 IN | OXYGEN SATURATION: 99 % | HEART RATE: 70 BPM | TEMPERATURE: 98.3 F

## 2025-08-27 DIAGNOSIS — Z12.4 SCREENING FOR CERVICAL CANCER: Primary | ICD-10-CM

## 2025-08-27 DIAGNOSIS — Z01.411 ENCOUNTER FOR GYNECOLOGICAL EXAMINATION WITH ABNORMAL FINDING: ICD-10-CM

## 2025-08-27 PROCEDURE — 99395 PREV VISIT EST AGE 18-39: CPT | Performed by: OBSTETRICS & GYNECOLOGY

## 2025-08-27 PROCEDURE — 99459 PELVIC EXAMINATION: CPT | Performed by: OBSTETRICS & GYNECOLOGY

## (undated) DEVICE — BASIC DOUBLE BASIN 2-LF: Brand: MEDLINE INDUSTRIES, INC.

## (undated) DEVICE — NDL CNTR 40CT FM MAG: Brand: MEDLINE INDUSTRIES, INC.

## (undated) DEVICE — TROCAR: Brand: KII SHIELDED BLADED ACCESS SYSTEM

## (undated) DEVICE — INSUFFLATION NEEDLE TO ESTABLISH PNEUMOPERITONEUM.: Brand: INSUFFLATION NEEDLE

## (undated) DEVICE — VAGINAL PREP TRAY: Brand: MEDLINE INDUSTRIES, INC.

## (undated) DEVICE — [HIGH FLOW INSUFFLATOR,  DO NOT USE IF PACKAGE IS DAMAGED,  KEEP DRY,  KEEP AWAY FROM SUNLIGHT,  PROTECT FROM HEAT AND RADIOACTIVE SOURCES.]: Brand: PNEUMOSURE

## (undated) DEVICE — 3M™ STERI-STRIP™ ELASTIC SKIN CLOSURES, E4547, 1/2 IN X 4 IN (12 MM X 100 MM), 6 STRIPS/ENVELOPE: Brand: 3M™ STERI-STRIP™

## (undated) DEVICE — MICRO TIP WIPE: Brand: DEVON

## (undated) DEVICE — PAD MATERNITY CURITY ADH STRIP DISP

## (undated) DEVICE — STERILE POLYISOPRENE POWDER-FREE SURGICAL GLOVES WITH EMOLLIENT COATING: Brand: PROTEXIS

## (undated) DEVICE — SEALER TISS L35CM ADV BPLR STR TIP LAP APPRCH ENSEAL G2

## (undated) DEVICE — ANTI-FOG SOLUTION WITH FOAM PAD: Brand: DEVON

## (undated) DEVICE — SCALPEL SURG NO21 S STL STR W/ INTEGR MTRC RUL DISP

## (undated) DEVICE — GAUZE,SPONGE,4"X4",16PLY,XRAY,STRL,LF: Brand: MEDLINE

## (undated) DEVICE — CHLORAPREP 26ML ORANGE

## (undated) DEVICE — STANDARD HYPODERMIC NEEDLE,POLYPROPYLENE HUB: Brand: MONOJECT

## (undated) DEVICE — SYRINGE MED 10ML LUERLOCK TIP W/O SFTY DISP

## (undated) DEVICE — DRESSING ANTIMIC FOAM MEPILEX BORD POSTOP AG PROD SZ 4X12 IN

## (undated) DEVICE — SYRINGE, LUER LOCK, 10ML: Brand: MEDLINE

## (undated) DEVICE — LINER,SOFT,SUCTION CANISTER,1500CC: Brand: MEDLINE

## (undated) DEVICE — BLADE ES L6IN ELASTOMERIC COAT EXT DURABLE BEND UPTO 90DEG

## (undated) DEVICE — ELECTRODE PT RET AD L9FT HI MOIST COND ADH HYDRGEL CORDED

## (undated) DEVICE — DOUBLE BASIN SET: Brand: MEDLINE INDUSTRIES, INC.

## (undated) DEVICE — 40586 ADVANCED TRENDELENBURG POSITIONING KIT: Brand: 40586 ADVANCED TRENDELENBURG POSITIONING KIT

## (undated) DEVICE — INTENDED FOR TISSUE SEPARATION, AND OTHER PROCEDURES THAT REQUIRE A SHARP SURGICAL BLADE TO PUNCTURE OR CUT.: Brand: BARD-PARKER ® STAINLESS STEEL BLADES

## (undated) DEVICE — BINDER ABD M/L H12IN FOR 46-62IN WHT 4 SLD PNL DSGN HOOP

## (undated) DEVICE — WIPES SKIN CLOTH READYPREP 9 X 10.5 IN 2% CHLORHEX GLUCONATE CHG PREOP

## (undated) DEVICE — BLADE,STAINLESS-STEEL,10,STRL,DISPOSABLE: Brand: MEDLINE

## (undated) DEVICE — 1LYRTR 16FR10ML100%SIL UMS SNP: Brand: MEDLINE INDUSTRIES, INC.

## (undated) DEVICE — SET ENDO INSTR GRN LAPAROSCOPIC

## (undated) DEVICE — GOWN,SIRUS,NONRNF,SETINSLV,XL,20/CS: Brand: MEDLINE

## (undated) DEVICE — GARMENT,MEDLINE,DVT,INT,CALF,MED, GEN2: Brand: MEDLINE

## (undated) DEVICE — NEEDLE INSUF L120MM DIA2MM DISP FOR PNEUMOPERI ENDOPATH

## (undated) DEVICE — Z INACTIVE USE 2660664 SOLUTION IRRIG 3000ML 0.9% SOD CHL USP UROMATIC PLAS CONT

## (undated) DEVICE — PATIENT RETURN ELECTRODE, SINGLE-USE, CONTACT QUALITY MONITORING, ADULT, WITH 9FT CORD, FOR PATIENTS WEIGING OVER 33LBS. (15KG): Brand: MEGADYNE

## (undated) DEVICE — MARKER,SKIN,WI/RULER AND LABELS: Brand: MEDLINE

## (undated) DEVICE — SEALER ENDOSCP NANO COAT OPN DIV CRV L JAW LIGASURE IMPACT

## (undated) DEVICE — PENCIL ES L3M BTTN SWCH HOLSTER W/ BLDE ELECTRD EDGE

## (undated) DEVICE — CAMERA STRYKER 1488 HD GEN

## (undated) DEVICE — SMARTGOWN BREATHABLE SURGICAL GOWN: Brand: CONVERTORS

## (undated) DEVICE — SUTURE ABSRB CHROMIC GUT MFIL CTX NO 1 27IN 865H

## (undated) DEVICE — CESAREAN BIRTH PACK: Brand: MEDLINE INDUSTRIES, INC.

## (undated) DEVICE — BANDAGE,GAUZE,4.5"X4.1YD,STERILE,LF: Brand: MEDLINE

## (undated) DEVICE — SET ENDO INSTR LAPAROSCOPIC INCISIONAL

## (undated) DEVICE — TRAY PROCED DILATATION CURETTAGE

## (undated) DEVICE — BLADE CLIPPER GEN PURP NS

## (undated) DEVICE — PACK,AURORA,LAVH: Brand: MEDLINE

## (undated) DEVICE — SUTURE VCRL SZ 3-0 L36IN ABSRB VLT CT L40MM 1/2 CIR J356H

## (undated) DEVICE — SYRINGE MED 50ML LUERLOCK TIP

## (undated) DEVICE — COVER,LIGHT HANDLE,FLX,1/PK: Brand: MEDLINE INDUSTRIES, INC.

## (undated) DEVICE — CATHETER,URETHRAL,VINYL,MALE,16",16 FR: Brand: MEDLINE

## (undated) DEVICE — TOTAL TRAY, 16FR 10ML SIL FOLEY, URN: Brand: MEDLINE

## (undated) DEVICE — PAD,NON-ADHERENT,3X8,STERILE,LF,1/PK: Brand: MEDLINE

## (undated) DEVICE — TUBE BLD COLLECT ST 1 SIL COAT 7ML 10ML

## (undated) DEVICE — SUTURE VCRL + SZ 0 L36IN ABSRB VLT L36MM CT-1 1/2 CIR VCP346H

## (undated) DEVICE — MAGNETIC INSTR DRAPE 20X16: Brand: MEDLINE INDUSTRIES, INC.

## (undated) DEVICE — SYRINGE MED 10ML TRNSLUC BRL PLUNG BLK MRK POLYPR CTRL

## (undated) DEVICE — SUTURE CHROMIC GUT 0 L36IN CT-1 L36MM 1/2 CIR TAPERPOINT 924H

## (undated) DEVICE — SUTURE VCRL SZ 4-0 L18IN ABSRB UD L19MM PS-2 3/8 CIR PRIM J496H

## (undated) DEVICE — SOLUTION IRRIG 1000ML 0.9% SOD CHL USP POUR PLAS BTL

## (undated) DEVICE — NEEDLE HYPO 25GA L1.5IN BLU POLYPR HUB S STL REG BVL STR

## (undated) DEVICE — SCALPEL SURG NO10 S STL ABS PLAS HNDL DISPOSABLE

## (undated) DEVICE — 4-PORT MANIFOLD: Brand: NEPTUNE 2

## (undated) DEVICE — BARRIER ADH W3XL4IN UTER PELV ABSRB GYNECARE INTCEED

## (undated) DEVICE — SOLUTION IV 100ML 0.9% SOD CHL PLAS CONT USP VIAFLX 1 PER

## (undated) DEVICE — TRAY,VAG PREP,2PR VNYL GLV,4 C: Brand: MEDLINE INDUSTRIES, INC.

## (undated) DEVICE — TOWEL,OR,DSP,ST,BLUE,STD,6/PK,12PK/CS: Brand: MEDLINE

## (undated) DEVICE — AMD ANTIMICROBIAL BANDAGE ROLL,6 PLY: Brand: KERLIX

## (undated) DEVICE — BINDER ABD SM M W12IN CIRC 30 45IN 4 PNL E CNTCT CLSR POSTOP

## (undated) DEVICE — SET COLL TBNG L6FT DIA3/8IN W/ INTEGR SWVL HNDL SLIP RNG M

## (undated) DEVICE — SURGICEL ENDOSCP APPL

## (undated) DEVICE — GOWN,SIRUS,FABRNF,XL,20/CS: Brand: MEDLINE

## (undated) DEVICE — SLEEVE COMPRESS STD CALF KNEE SCD

## (undated) DEVICE — GLOVE ORANGE PI 7 1/2   MSG9075

## (undated) DEVICE — APPLICATOR MEDICATED 26 CC SOLUTION HI LT ORNG CHLORAPREP

## (undated) DEVICE — STRIP,CLOSURE,WOUND,MEDI-STRIP,1/2X4: Brand: MEDLINE

## (undated) DEVICE — BINDER ABD UNISX 4 PNL PREM 6INX6INX12IN L XL 4

## (undated) DEVICE — MONOJECT MAGELLAN 1 ML TUBERCULIN SAFETY SYRINGE PERMANENT NEEDLE 27G X1/2 IN. (0.4 X 13 MM): Brand: MONOJECT

## (undated) DEVICE — NEEDLE SPNL 22GA L7IN BLK HUB S STL W/ QNCKE PNT W/OUT

## (undated) DEVICE — STAPLER SKIN SQ 30 ABSRB STPL DISP INSORB ORDER VIA PHONE OR EMAIL

## (undated) DEVICE — PITCHER PT 1200ML W HNDL CSR WRP

## (undated) DEVICE — SOLUTION IV 1000ML LAC RINGERS PH 6.5 INJ USP VIAFLX PLAS

## (undated) DEVICE — PACK,UNIVERSAL,NO GOWNS: Brand: MEDLINE

## (undated) DEVICE — Z DISCONTINUED USE 2659136 CANNULA VAC DIA12MM CRV SEMI RIG W/ ROUNDED TIP TAPR END

## (undated) DEVICE — PACK PROC 3IN1 W/ L12FT DIA0.25IN REINF SUCT TBNG W50XL901IN

## (undated) DEVICE — AGENT HEMSTAT W4XL4IN OXIDIZED REGENERATED CELOS ABSRB SFT

## (undated) DEVICE — DRESSING ALG W6XL8IN THN ABSRB SELF ADH BORD MEPILEX

## (undated) DEVICE — ZAMI/KRONNER 4.5, UTERINE MANIPULATOR/INJECTOR, 12/BX: Brand: MARINA MEDICAL

## (undated) DEVICE — APPLICATOR PREP 26ML 0.7% IOD POVACRYLEX 74% ISO ALC ST

## (undated) DEVICE — MASTISOL ADHESIVE LIQ 2/3ML

## (undated) DEVICE — CATHETER,URETHRAL,VINYL,16",14 FR: Brand: MEDLINE

## (undated) DEVICE — GLOVE SURG SZ 65 THK91MIL LTX FREE SYN POLYISOPRENE

## (undated) DEVICE — SPONGE LAP W18XL18IN WHT COT 4 PLY FLD STRUNG RADPQ DISP ST 2 PER PACK

## (undated) DEVICE — TUBING, SUCTION, 1/4" X 10', STRAIGHT: Brand: MEDLINE

## (undated) DEVICE — SOLUTION INJ ST H2O VIAFLX PLAS 1000ML CONT FOR DRUG DIL

## (undated) DEVICE — PAD,ABDOMINAL,5"X9",ST,LF,25/BX: Brand: MEDLINE INDUSTRIES, INC.

## (undated) DEVICE — Z INACTIVE USE 2735373 APPLICATOR FBR LAIN COT WOOD TIP ECONOMICAL

## (undated) DEVICE — TROCAR: Brand: KII® SLEEVE